# Patient Record
Sex: MALE | Race: BLACK OR AFRICAN AMERICAN | NOT HISPANIC OR LATINO | Employment: OTHER | URBAN - METROPOLITAN AREA
[De-identification: names, ages, dates, MRNs, and addresses within clinical notes are randomized per-mention and may not be internally consistent; named-entity substitution may affect disease eponyms.]

---

## 2017-10-27 ENCOUNTER — HOSPITAL ENCOUNTER (EMERGENCY)
Facility: MEDICAL CENTER | Age: 57
End: 2017-10-27
Attending: EMERGENCY MEDICINE
Payer: COMMERCIAL

## 2017-10-27 ENCOUNTER — APPOINTMENT (OUTPATIENT)
Dept: RADIOLOGY | Facility: MEDICAL CENTER | Age: 57
End: 2017-10-27
Attending: EMERGENCY MEDICINE
Payer: COMMERCIAL

## 2017-10-27 VITALS
DIASTOLIC BLOOD PRESSURE: 88 MMHG | WEIGHT: 41.89 LBS | BODY MASS INDEX: 5.67 KG/M2 | RESPIRATION RATE: 18 BRPM | OXYGEN SATURATION: 98 % | TEMPERATURE: 97.8 F | HEIGHT: 72 IN | SYSTOLIC BLOOD PRESSURE: 132 MMHG | HEART RATE: 62 BPM

## 2017-10-27 DIAGNOSIS — N30.01 ACUTE CYSTITIS WITH HEMATURIA: ICD-10-CM

## 2017-10-27 LAB
ALBUMIN SERPL BCP-MCNC: 3.7 G/DL (ref 3.2–4.9)
ALBUMIN/GLOB SERPL: 1.5 G/DL
ALP SERPL-CCNC: 69 U/L (ref 30–99)
ALT SERPL-CCNC: 23 U/L (ref 2–50)
ANION GAP SERPL CALC-SCNC: 6 MMOL/L (ref 0–11.9)
APPEARANCE UR: ABNORMAL
AST SERPL-CCNC: 30 U/L (ref 12–45)
BACTERIA #/AREA URNS HPF: ABNORMAL /HPF
BASOPHILS # BLD AUTO: 0.5 % (ref 0–1.8)
BASOPHILS # BLD: 0.03 K/UL (ref 0–0.12)
BILIRUB SERPL-MCNC: 1 MG/DL (ref 0.1–1.5)
BILIRUB UR QL STRIP.AUTO: NEGATIVE
BUN SERPL-MCNC: 13 MG/DL (ref 8–22)
CALCIUM SERPL-MCNC: 8.5 MG/DL (ref 8.4–10.2)
CHLORIDE SERPL-SCNC: 107 MMOL/L (ref 96–112)
CO2 SERPL-SCNC: 25 MMOL/L (ref 20–33)
COLOR UR: YELLOW
CREAT SERPL-MCNC: 1.21 MG/DL (ref 0.5–1.4)
EOSINOPHIL # BLD AUTO: 0.06 K/UL (ref 0–0.51)
EOSINOPHIL NFR BLD: 0.9 % (ref 0–6.9)
ERYTHROCYTE [DISTWIDTH] IN BLOOD BY AUTOMATED COUNT: 38.8 FL (ref 35.9–50)
GFR SERPL CREATININE-BSD FRML MDRD: >60 ML/MIN/1.73 M 2
GLOBULIN SER CALC-MCNC: 2.4 G/DL (ref 1.9–3.5)
GLUCOSE SERPL-MCNC: 150 MG/DL (ref 65–99)
GLUCOSE UR STRIP.AUTO-MCNC: NEGATIVE MG/DL
HCT VFR BLD AUTO: 43.7 % (ref 42–52)
HGB BLD-MCNC: 14.1 G/DL (ref 14–18)
IMM GRANULOCYTES # BLD AUTO: 0.01 K/UL (ref 0–0.11)
IMM GRANULOCYTES NFR BLD AUTO: 0.2 % (ref 0–0.9)
KETONES UR STRIP.AUTO-MCNC: NEGATIVE MG/DL
LEUKOCYTE ESTERASE UR QL STRIP.AUTO: ABNORMAL
LIPASE SERPL-CCNC: 91 U/L (ref 7–58)
LYMPHOCYTES # BLD AUTO: 2.41 K/UL (ref 1–4.8)
LYMPHOCYTES NFR BLD: 37.1 % (ref 22–41)
MCH RBC QN AUTO: 27.9 PG (ref 27–33)
MCHC RBC AUTO-ENTMCNC: 32.3 G/DL (ref 33.7–35.3)
MCV RBC AUTO: 86.5 FL (ref 81.4–97.8)
MICRO URNS: ABNORMAL
MONOCYTES # BLD AUTO: 0.56 K/UL (ref 0–0.85)
MONOCYTES NFR BLD AUTO: 8.6 % (ref 0–13.4)
MUCOUS THREADS #/AREA URNS HPF: ABNORMAL /HPF
NEUTROPHILS # BLD AUTO: 3.42 K/UL (ref 1.82–7.42)
NEUTROPHILS NFR BLD: 52.7 % (ref 44–72)
NITRITE UR QL STRIP.AUTO: POSITIVE
NRBC # BLD AUTO: 0 K/UL
NRBC BLD AUTO-RTO: 0 /100 WBC
PH UR STRIP.AUTO: 6 [PH]
PLATELET # BLD AUTO: 268 K/UL (ref 164–446)
PMV BLD AUTO: 9.6 FL (ref 9–12.9)
POTASSIUM SERPL-SCNC: 3.7 MMOL/L (ref 3.6–5.5)
PROT SERPL-MCNC: 6.1 G/DL (ref 6–8.2)
PROT UR QL STRIP: NEGATIVE MG/DL
RBC # BLD AUTO: 5.05 M/UL (ref 4.7–6.1)
RBC # URNS HPF: ABNORMAL /HPF
RBC UR QL AUTO: ABNORMAL
SODIUM SERPL-SCNC: 138 MMOL/L (ref 135–145)
SP GR UR STRIP.AUTO: 1.02
WBC # BLD AUTO: 6.5 K/UL (ref 4.8–10.8)
WBC #/AREA URNS HPF: ABNORMAL /HPF

## 2017-10-27 PROCEDURE — 36415 COLL VENOUS BLD VENIPUNCTURE: CPT

## 2017-10-27 PROCEDURE — 80053 COMPREHEN METABOLIC PANEL: CPT

## 2017-10-27 PROCEDURE — 83690 ASSAY OF LIPASE: CPT

## 2017-10-27 PROCEDURE — 99284 EMERGENCY DEPT VISIT MOD MDM: CPT

## 2017-10-27 PROCEDURE — 81001 URINALYSIS AUTO W/SCOPE: CPT

## 2017-10-27 PROCEDURE — 74177 CT ABD & PELVIS W/CONTRAST: CPT

## 2017-10-27 PROCEDURE — 700105 HCHG RX REV CODE 258: Performed by: EMERGENCY MEDICINE

## 2017-10-27 PROCEDURE — 85025 COMPLETE CBC W/AUTO DIFF WBC: CPT

## 2017-10-27 PROCEDURE — 700117 HCHG RX CONTRAST REV CODE 255: Performed by: EMERGENCY MEDICINE

## 2017-10-27 RX ORDER — OXYCODONE AND ACETAMINOPHEN 10; 325 MG/1; MG/1
1 TABLET ORAL EVERY 8 HOURS PRN
Status: SHIPPED | COMMUNITY
End: 2018-10-05

## 2017-10-27 RX ORDER — LEVOFLOXACIN 750 MG/1
750 TABLET, FILM COATED ORAL DAILY
Qty: 14 TAB | Refills: 0 | Status: SHIPPED | OUTPATIENT
Start: 2017-10-27 | End: 2018-02-19

## 2017-10-27 RX ORDER — COLCHICINE 0.6 MG/1
0.6 TABLET ORAL
Status: SHIPPED | COMMUNITY
End: 2020-06-02

## 2017-10-27 RX ORDER — TAMSULOSIN HYDROCHLORIDE 0.4 MG/1
0.4 CAPSULE ORAL
Status: SHIPPED | COMMUNITY
End: 2018-10-05

## 2017-10-27 RX ORDER — SODIUM CHLORIDE 9 MG/ML
1000 INJECTION, SOLUTION INTRAVENOUS ONCE
Status: COMPLETED | OUTPATIENT
Start: 2017-10-27 | End: 2017-10-27

## 2017-10-27 RX ADMIN — SODIUM CHLORIDE 1000 ML: 9 INJECTION, SOLUTION INTRAVENOUS at 10:53

## 2017-10-27 RX ADMIN — IOHEXOL 100 ML: 350 INJECTION, SOLUTION INTRAVENOUS at 11:17

## 2017-10-27 ASSESSMENT — PAIN SCALES - GENERAL
PAINLEVEL_OUTOF10: 3
PAINLEVEL_OUTOF10: 3

## 2017-10-27 NOTE — ED NOTES
Patient was on antibiotics in early sep. First picked up Ciproo on Sept 5, but identified a contradiction with other meds, so started taking Bactrim for 10 days starting Sep 6th.

## 2017-10-27 NOTE — ED NOTES
Patient in bed with family at bedside. Pt complains on lower abdominal pain intermittently since Aug Pt states he has been treated with Flowmax for kidney stones which resolves the pain, then when medication is finished pain and dysuria comes back. Urine sample taken to lab. Iv started, and blood samples taken to lab. Patient provided fluids, and taken to CT.

## 2017-10-27 NOTE — ED PROVIDER NOTES
ED Provider Note    CHIEF COMPLAINT  Abdominal pain    HPI  Ra Smith is a 57 y.o. male who presents for evaluation of abdominal pain.  Patient states he having pain since early September.  The patient was seen at a hospital in [place], [place] where a CT scan which was equivocal for a kidney stone.  The patient states he was put on antibiotics and Flomax for urinary tract infection by his primary care physician.  Patient presents complaining persistent pain in the left lower quadrant area radiating toward his left groin.  The patient denies: Fever, chills, URI symptoms, cardiorespiratory symptoms, nausea, vomiting, hematemesis, melena, hematochezia, hematuria, dysuria.  No other acute symptomatology or complaints.    REVIEW OF SYSTEMS  See HPI for further details.  No history of: Hypertension, diabetes, thyroid dysfunction, seizures, heart disease, cancer, stroke, renal disorders, liver disorders.  All other systems negative.    PAST MEDICAL HISTORY  Past Medical History:   Diagnosis Date   • Arthritis     knees- OA generalized   • Gout    • Hard of hearing    • Pain     back,legs,knees,wrist,ankle,pain scale 6   • Sleep apnea     does not use CPAP   • Snoring        FAMILY HISTORY  Family History   Problem Relation Age of Onset   • Heart Disease Mother    • Diabetes Mother    • Hypertension Mother        SOCIAL HISTORY  Past history tobacco use; positive alcohol use; positive marijuana use;    SURGICAL HISTORY  Past Surgical History:   Procedure Laterality Date   • CARPAL TUNNEL ENDOSCOPIC Right 12/30/2016    Procedure: CARPAL TUNNEL ENDOSCOPIC;  Surgeon: Dragan Cohen M.D.;  Location: [facility];  Service:    • LUMBAR DECOMPRESSION  11/16/2009    Performed by GARY PAEZ at [facility]   • KNEE ARTHROSCOPY  3/19/2009    Performed by TONYA EWING at [facility]   • MEDIAL MENISCECTOMY  3/19/2009    Performed by TONYA EWING at [facility]    • CARPAL TUNNEL ENDOSCOPIC Left    • KNEE ARTHROSCOPY Left     x2   • KNEE ARTHROSCOPY Right     x 3       CURRENT MEDICATIONS  See nurses notes    ALLERGIES  Allergies   Allergen Reactions   • Aspirin      Patient has a GY6PD deficiency - can't have aspirin       PHYSICAL EXAM  VITAL SIGNS: /88   Pulse 73   Temp 36.6 °C (97.8 °F)   Resp 18   Ht 1.829 m (6')   Wt (!) 19 kg (41 lb 14.2 oz)   SpO2 96%   BMI 5.68 kg/m²    Constitutional: 57-year-old black male, Well developed, Well nourished, No acute distress, Non-toxic appearance.   HENT: ,Atraumatic, Bilateral external ears normal, tympanic membranes clear, Oropharynx moist, No oral exudates, Nose normal.   Eyes: PERRL, EOMI, Conjunctiva normal, No discharge.   Neck: Normal range of motion, No tenderness, Supple, No stridor.   Lymphatic: No lymphadenopathy noted.   Cardiovascular: Normal heart rate, Normal rhythm, No murmurs, No rubs, No gallops.    Thorax & Lungs: Normal Equal breath sounds, No respiratory distress, No wheezing, no stridor, no rales. No chest tenderness.   Abdomen: Tenderness in left lower quadrant area, nondistended, no organomegaly, positive bowel sounds normal in quality. No guarding or rebound.  Skin: Good skin turgor, pink, warm, dry. No rashes, petechiae, purpura. Normal capillary refill.   Back: No tenderness, No CVA tenderness.   Genitalia: External genitalia appear normal, No masses or lesions. No discharge. Nontender  Extremities: Intact distal pulses, No edema, No tenderness, No cyanosis, No clubbing. Vascular: Pulses are 2+, symmetric in the upper and lower extremities.  Musculoskeletal: Mild diffuse arthritic changes. No tenderness to palpation or major deformities noted.   Neurologic: Alert & oriented x 3, Normal motor function, Normal sensory function, No gross focal deficits noted.   Psychiatric: Affect normal, Judgment normal, Mood normal.     RADIOLOGY/PROCEDURES  CT-ABDOMEN-PELVIS WITH   Final Result      1.  No  acute abnormality within the abdomen or pelvis      2.  Thickened bladder wall suggesting chronic cystitis or sequela of bladder outlet obstruction      3.  Facet arthropathy of the lumbar spine      4.  Degenerative subluxation of L5-S1               COURSE & MEDICAL DECISION MAKING  Pertinent Labs & Imaging studies reviewed. (See chart for details)  1.  IV normal saline    Laboratory studies: CBC and differential within normal limits; CMP shows a random blood sugar 150 otherwise within normal; lipase mildly elevated at 91; Urinalysis positive for nitrite and leukocyte esterase, wbc's 10-20, rbc's 2-5, bacteria many;    Discussion: At this time, the patient has findings consistent with cystitis.  The patient has no evidence of toxicity or pyelonephritis or associated kidney stones.  No evidence of any gastrointestinal abnormalities.  The patient looks well clinically.  Based on these findings, I going To trial of outpatient therapy.  The patient was previously on Bactrim.  I will prescribe Levaquin.  Urine culture will be obtained.  I discussed the findings and treatment plan with the patient and his wife.  He indicates they're comfortable with this explanation and disposition.    FINAL IMPRESSION  1. Acute cystitis with hematuria        PLAN  1.  Appropriate discharge instructions give  2.  Levaquin 70 mg daily #14  3.  Follow-up with urology  4.  Recheck if any fever change or worsening symptoms as discussed    Electronically signed by: Guy G Gansert, 10/27/2017 10:18 AM

## 2017-10-27 NOTE — ED NOTES
Med rec complete per patient and [facility] [phone]  Allergies reviewed    patient has been on 3 courses of Flomax and once he's off the pain comes back

## 2017-10-27 NOTE — ED NOTES
Pt amb to triage c/o pain to lower abd and  L flank with dysuria. Hx of kidney stones. Denies n/v.     /88   Pulse 73   Temp 36.6 °C (97.8 °F)   Resp 18   Ht 1.829 m (6')   Wt (!) 19 kg (41 lb 14.2 oz)   SpO2 96%   BMI 5.68 kg/m²

## 2018-02-19 ENCOUNTER — HOSPITAL ENCOUNTER (EMERGENCY)
Facility: MEDICAL CENTER | Age: 58
End: 2018-02-19
Attending: EMERGENCY MEDICINE
Payer: COMMERCIAL

## 2018-02-19 ENCOUNTER — APPOINTMENT (OUTPATIENT)
Dept: RADIOLOGY | Facility: MEDICAL CENTER | Age: 58
End: 2018-02-19
Attending: EMERGENCY MEDICINE
Payer: COMMERCIAL

## 2018-02-19 VITALS
HEART RATE: 80 BPM | HEIGHT: 73 IN | BODY MASS INDEX: 35.21 KG/M2 | TEMPERATURE: 99.1 F | DIASTOLIC BLOOD PRESSURE: 100 MMHG | OXYGEN SATURATION: 96 % | RESPIRATION RATE: 16 BRPM | SYSTOLIC BLOOD PRESSURE: 135 MMHG | WEIGHT: 265.65 LBS

## 2018-02-19 DIAGNOSIS — R10.9 FLANK PAIN: ICD-10-CM

## 2018-02-19 LAB
ALBUMIN SERPL BCP-MCNC: 3.9 G/DL (ref 3.2–4.9)
ALBUMIN/GLOB SERPL: 1.6 G/DL
ALP SERPL-CCNC: 60 U/L (ref 30–99)
ALT SERPL-CCNC: 21 U/L (ref 2–50)
ANION GAP SERPL CALC-SCNC: 3 MMOL/L (ref 0–11.9)
APPEARANCE UR: CLEAR
AST SERPL-CCNC: 28 U/L (ref 12–45)
BASOPHILS # BLD AUTO: 0.4 % (ref 0–1.8)
BASOPHILS # BLD: 0.02 K/UL (ref 0–0.12)
BILIRUB SERPL-MCNC: 1.1 MG/DL (ref 0.1–1.5)
BILIRUB UR QL STRIP.AUTO: NEGATIVE
BUN SERPL-MCNC: 10 MG/DL (ref 8–22)
CALCIUM SERPL-MCNC: 8.8 MG/DL (ref 8.4–10.2)
CHLORIDE SERPL-SCNC: 105 MMOL/L (ref 96–112)
CO2 SERPL-SCNC: 27 MMOL/L (ref 20–33)
COLOR UR: YELLOW
CREAT SERPL-MCNC: 1.11 MG/DL (ref 0.5–1.4)
CULTURE IF INDICATED INDCX: YES UA CULTURE
EKG IMPRESSION: NORMAL
EOSINOPHIL # BLD AUTO: 0.08 K/UL (ref 0–0.51)
EOSINOPHIL NFR BLD: 1.8 % (ref 0–6.9)
EPI CELLS #/AREA URNS HPF: ABNORMAL /HPF
ERYTHROCYTE [DISTWIDTH] IN BLOOD BY AUTOMATED COUNT: 38.5 FL (ref 35.9–50)
GLOBULIN SER CALC-MCNC: 2.5 G/DL (ref 1.9–3.5)
GLUCOSE SERPL-MCNC: 110 MG/DL (ref 65–99)
GLUCOSE UR STRIP.AUTO-MCNC: NEGATIVE MG/DL
HCT VFR BLD AUTO: 44.8 % (ref 42–52)
HGB BLD-MCNC: 14.3 G/DL (ref 14–18)
IMM GRANULOCYTES # BLD AUTO: 0.01 K/UL (ref 0–0.11)
IMM GRANULOCYTES NFR BLD AUTO: 0.2 % (ref 0–0.9)
KETONES UR STRIP.AUTO-MCNC: NEGATIVE MG/DL
LEUKOCYTE ESTERASE UR QL STRIP.AUTO: ABNORMAL
LYMPHOCYTES # BLD AUTO: 2 K/UL (ref 1–4.8)
LYMPHOCYTES NFR BLD: 44.2 % (ref 22–41)
MCH RBC QN AUTO: 27.2 PG (ref 27–33)
MCHC RBC AUTO-ENTMCNC: 31.9 G/DL (ref 33.7–35.3)
MCV RBC AUTO: 85.2 FL (ref 81.4–97.8)
MICRO URNS: ABNORMAL
MONOCYTES # BLD AUTO: 0.47 K/UL (ref 0–0.85)
MONOCYTES NFR BLD AUTO: 10.4 % (ref 0–13.4)
MUCOUS THREADS #/AREA URNS HPF: ABNORMAL /HPF
NEUTROPHILS # BLD AUTO: 1.94 K/UL (ref 1.82–7.42)
NEUTROPHILS NFR BLD: 43 % (ref 44–72)
NITRITE UR QL STRIP.AUTO: NEGATIVE
NRBC # BLD AUTO: 0 K/UL
NRBC BLD-RTO: 0 /100 WBC
PH UR STRIP.AUTO: 6 [PH]
PLATELET # BLD AUTO: 252 K/UL (ref 164–446)
PMV BLD AUTO: 9.5 FL (ref 9–12.9)
POTASSIUM SERPL-SCNC: 3.9 MMOL/L (ref 3.6–5.5)
PROT SERPL-MCNC: 6.4 G/DL (ref 6–8.2)
PROT UR QL STRIP: NEGATIVE MG/DL
RBC # BLD AUTO: 5.26 M/UL (ref 4.7–6.1)
RBC # URNS HPF: ABNORMAL /HPF
RBC UR QL AUTO: ABNORMAL
SODIUM SERPL-SCNC: 135 MMOL/L (ref 135–145)
SP GR UR STRIP.AUTO: 1.01
WBC # BLD AUTO: 4.5 K/UL (ref 4.8–10.8)
WBC #/AREA URNS HPF: ABNORMAL /HPF

## 2018-02-19 PROCEDURE — 36415 COLL VENOUS BLD VENIPUNCTURE: CPT

## 2018-02-19 PROCEDURE — 93005 ELECTROCARDIOGRAM TRACING: CPT | Performed by: EMERGENCY MEDICINE

## 2018-02-19 PROCEDURE — 99285 EMERGENCY DEPT VISIT HI MDM: CPT

## 2018-02-19 PROCEDURE — 96374 THER/PROPH/DIAG INJ IV PUSH: CPT

## 2018-02-19 PROCEDURE — 700111 HCHG RX REV CODE 636 W/ 250 OVERRIDE (IP): Performed by: EMERGENCY MEDICINE

## 2018-02-19 PROCEDURE — 96375 TX/PRO/DX INJ NEW DRUG ADDON: CPT

## 2018-02-19 PROCEDURE — 85025 COMPLETE CBC W/AUTO DIFF WBC: CPT

## 2018-02-19 PROCEDURE — 87086 URINE CULTURE/COLONY COUNT: CPT

## 2018-02-19 PROCEDURE — 81001 URINALYSIS AUTO W/SCOPE: CPT

## 2018-02-19 PROCEDURE — 700105 HCHG RX REV CODE 258: Performed by: EMERGENCY MEDICINE

## 2018-02-19 PROCEDURE — 74176 CT ABD & PELVIS W/O CONTRAST: CPT

## 2018-02-19 PROCEDURE — 80053 COMPREHEN METABOLIC PANEL: CPT

## 2018-02-19 RX ORDER — PREDNISONE 20 MG/1
40 TABLET ORAL DAILY
Qty: 10 TAB | Refills: 0 | Status: SHIPPED | OUTPATIENT
Start: 2018-02-19 | End: 2018-02-19

## 2018-02-19 RX ORDER — PREDNISONE 20 MG/1
40 TABLET ORAL DAILY
Qty: 10 TAB | Refills: 0 | Status: SHIPPED | OUTPATIENT
Start: 2018-02-19 | End: 2018-02-24

## 2018-02-19 RX ORDER — KETOROLAC TROMETHAMINE 30 MG/ML
30 INJECTION, SOLUTION INTRAMUSCULAR; INTRAVENOUS ONCE
Status: DISCONTINUED | OUTPATIENT
Start: 2018-02-19 | End: 2018-02-19 | Stop reason: HOSPADM

## 2018-02-19 RX ORDER — MORPHINE SULFATE 4 MG/ML
4 INJECTION, SOLUTION INTRAMUSCULAR; INTRAVENOUS ONCE
Status: COMPLETED | OUTPATIENT
Start: 2018-02-19 | End: 2018-02-19

## 2018-02-19 RX ORDER — OXYBUTYNIN CHLORIDE 10 MG/1
10 TABLET, EXTENDED RELEASE ORAL DAILY
Status: SHIPPED | COMMUNITY
End: 2018-10-05

## 2018-02-19 RX ORDER — ONDANSETRON 2 MG/ML
4 INJECTION INTRAMUSCULAR; INTRAVENOUS ONCE
Status: COMPLETED | OUTPATIENT
Start: 2018-02-19 | End: 2018-02-19

## 2018-02-19 RX ORDER — SODIUM CHLORIDE 9 MG/ML
1000 INJECTION, SOLUTION INTRAVENOUS ONCE
Status: COMPLETED | OUTPATIENT
Start: 2018-02-19 | End: 2018-02-19

## 2018-02-19 RX ADMIN — ONDANSETRON 4 MG: 2 INJECTION INTRAMUSCULAR; INTRAVENOUS at 08:23

## 2018-02-19 RX ADMIN — MORPHINE SULFATE 4 MG: 4 INJECTION INTRAVENOUS at 08:23

## 2018-02-19 RX ADMIN — SODIUM CHLORIDE 1000 ML: 9 INJECTION, SOLUTION INTRAVENOUS at 08:23

## 2018-02-19 NOTE — ED NOTES
"Chief Complaint   Patient presents with   • Flank Pain     Right, x 3 weeks, appointment w/ Urology next Monday     /100   Pulse 84   Temp 37.3 °C (99.1 °F)   Resp 16   Ht 1.854 m (6' 1\")   Wt 120.5 kg (265 lb 10.5 oz)   SpO2 90%   BMI 35.05 kg/m²     Pt states pain has been getting progressively worse.   Pt took Oxycodone around 0430 this morning.  "

## 2018-02-19 NOTE — DISCHARGE INSTRUCTIONS
Lumbosacral Radiculopathy  Lumbosacral radiculopathy is a condition that involves the spinal nerves and nerve roots in the low back and bottom of the spine. The condition develops when these nerves and nerve roots move out of place or become inflamed and cause symptoms.  CAUSES  This condition may be caused by:  · Pressure from a disk that bulges out of place (herniated disk). A disk is a plate of cartilage that separates bones in the spine.  · Disk degeneration.  · A narrowing of the bones of the lower back (spinal stenosis).  · A tumor.  · An infection.  · An injury that places sudden pressure on the disks that cushion the bones of your lower spine.  RISK FACTORS  This condition is more likely to develop in:  · Males aged 30-50 years.  · Females aged 50-60 years.  · People who lift improperly.  · People who are overweight or live a sedentary lifestyle.  · People who smoke.  · People who perform repetitive activities that strain the spine.  SYMPTOMS  Symptoms of this condition include:  · Pain that goes down from the back into the legs (sciatica). This is the most common symptom. The pain may be worse with sitting, coughing, or sneezing.  · Pain and numbness in the arms and legs.  · Muscle weakness.  · Tingling.  · Loss of bladder control or bowel control.  DIAGNOSIS  This condition is diagnosed with a physical exam and medical history. If the pain is lasting, you may have tests, such as:  · MRI scan.  · X-ray.  · CT scan.  · Myelogram.  · Nerve conduction study.  TREATMENT  This condition is often treated with:  · Hot packs and ice applied to affected areas.  · Stretches to improve flexibility.  · Exercises to strengthen back muscles.  · Physical therapy.  · Pain medicine.  · A steroid injection in the spine.  In some cases, no treatment is needed. If the condition is long-lasting (chronic), or if symptoms are severe, treatment may involve surgery or lifestyle changes, such as following a weight loss plan.  HOME  CARE INSTRUCTIONS  Medicines  · Take medicines only as directed by your health care provider.  · Do not drive or operate heavy machinery while taking pain medicine.  Injury Care  · Apply a heat pack to the injured area as directed by your health care provider.  · Apply ice to the affected area:  ¨ Put ice in a plastic bag.  ¨ Place a towel between your skin and the bag.  ¨ Leave the ice on for 20-30 minutes, every 2 hours while you are awake or as needed. Or, leave the ice on for as long as directed by your health care provider.  Other Instructions  · If you were shown how to do any exercises or stretches, do them as directed by your health care provider.  · If your health care provider prescribed a diet or exercise program, follow it as directed.  · Keep all follow-up visits as directed by your health care provider. This is important.  SEEK MEDICAL CARE IF:  · Your pain does not improve over time even when taking pain medicines.  SEEK IMMEDIATE MEDICAL CARE IF:  · Your develop severe pain.  · Your pain suddenly gets worse.  · You develop increasing weakness in your legs.  · You lose the ability to control your bladder or bowel.  · You have difficulty walking or balancing.  · You have a fever.     This information is not intended to replace advice given to you by your health care provider. Make sure you discuss any questions you have with your health care provider.     Document Released: 12/18/2006 Document Revised: 05/03/2016 Document Reviewed: 12/14/2015  [employer] Patient Education ©2016 [employer]  Flank Pain  Flank pain refers to pain that is located on the side of the body between the upper abdomen and the back. The pain may occur over a short period of time (acute) or may be long-term or reoccurring (chronic). It may be mild or severe. Flank pain can be caused by many things.  CAUSES   Some of the more common causes of flank pain include:  · Muscle strains.    · Muscle spasms.    · A disease of  your spine (vertebral disk disease).    · A lung infection (pneumonia).    · Fluid around your lungs (pulmonary edema).    · A kidney infection.    · Kidney stones.    · A very painful skin rash caused by the chickenpox virus (shingles).    · Gallbladder disease.    HOME CARE INSTRUCTIONS   Home care will depend on the cause of your pain. In general,  · Rest as directed by your caregiver.  · Drink enough fluids to keep your urine clear or pale yellow.  · Only take over-the-counter or prescription medicines as directed by your caregiver. Some medicines may help relieve the pain.  · Tell your caregiver about any changes in your pain.  · Follow up with your caregiver as directed.  SEEK IMMEDIATE MEDICAL CARE IF:   · Your pain is not controlled with medicine.    · You have new or worsening symptoms.  · Your pain increases.    · You have abdominal pain.    · You have shortness of breath.    · You have persistent nausea or vomiting.    · You have swelling in your abdomen.    · You feel faint or pass out.    · You have blood in your urine.  · You have a fever or persistent symptoms for more than 2-3 days.  · You have a fever and your symptoms suddenly get worse.  MAKE SURE YOU:   · Understand these instructions.  · Will watch your condition.  · Will get help right away if you are not doing well or get worse.     This information is not intended to replace advice given to you by your health care provider. Make sure you discuss any questions you have with your health care provider.     Document Released: 02/08/2007 Document Revised: 09/11/2013 Document Reviewed: 08/01/2013  [employer] Interactive Patient Education ©2016 [employer]

## 2018-02-19 NOTE — ED PROVIDER NOTES
ED Provider Note    CHIEF COMPLAINT  Chief Complaint   Patient presents with   • Flank Pain     Right, x 3 weeks, appointment w/ Urology next Monday        HPI  Ra Smith is a 57 y.o. male who presents to the ED with complaints of right-sided flank pain. The patient apparently has a history of kidney stones in the past, has an appointment with urology on Monday. Patient states however, that for the past 3 weeks, been having continuous pain. Premature the right flank with radiation to the right abdominal area. That's been gradually worsening. Patient actually started getting worse over the last 2 days, but decided to present to the ED for evaluation. Patient comes a pain now as 8/10-type pain, worse with movement on the right flank region with radiation to the right mid abdominal area. Denies any fevers, chills, nausea, vomiting, diarrhea, or any other symptoms.    REVIEW OF SYSTEMS  See HPI for further details. All other systems are negative.     PAST MEDICAL HISTORY  Past Medical History:   Diagnosis Date   • Arthritis     knees- OA generalized   • Gout    • Hard of hearing    • Kidney stones    • Pain     back,legs,knees,wrist,ankle,pain scale 6   • Sleep apnea     does not use CPAP   • Snoring        FAMILY HISTORY  Family History   Problem Relation Age of Onset   • Heart Disease Mother    • Diabetes Mother    • Hypertension Mother      Patient's family history has been discussed and is been found to be noncontributory to his present illness  SOCIAL HISTORY  Social History     Social History   • Marital status:      Spouse name: N/A   • Number of children: N/A   • Years of education: N/A     Social History Main Topics   • Smoking status: Current Every Day Smoker     Types: Cigars     Last attempt to quit: 11/1/2016   • Smokeless tobacco: Never Used      Comment: .5 ppd 15 yrs,quit 10/15/09   • Alcohol use Yes      Comment: 3 per week   • Drug use: Yes      Comment: marijuana   • Sexual activity:  Not on file     Other Topics Concern   • Not on file     Social History Narrative   • No narrative on file      Nicanor Mora M.D.  The patient denies any significant tobacco, alcohol or drug use      SURGICAL HISTORY  Past Surgical History:   Procedure Laterality Date   • CARPAL TUNNEL ENDOSCOPIC Right 12/30/2016    Procedure: CARPAL TUNNEL ENDOSCOPIC;  Surgeon: Dragan Cohen M.D.;  Location: SURGERY [facility];  Service:    • LUMBAR DECOMPRESSION  11/16/2009    Performed by GARY PAEZ at SURGERY [facility]   • KNEE ARTHROSCOPY  3/19/2009    Performed by TONYA EWING at SURGERY [facility]   • MEDIAL MENISCECTOMY  3/19/2009    Performed by TONYA EWING at SURGERY [facility]   • CARPAL TUNNEL ENDOSCOPIC Left    • KNEE ARTHROSCOPY Left     x2   • KNEE ARTHROSCOPY Right     x 3       CURRENT MEDICATIONS  Home Medications     Reviewed by Robert Horta (Pharmacy Tech) on 02/19/18 at 0911  Med List Status: Complete   Medication Last Dose Status   colchicine (COLCRYS) 0.6 MG Tab > 3 days Active   Glucos-Chondroit-Hyaluron-MSM (GLUCOSAMINE CHONDROITIN JOINT PO) 2/19/2018 Active   oxybutynin SR (DITROPAN-XL) 10 MG CR tablet 2/19/2018 Active   oxycodone-acetaminophen (PERCOCET-10)  MG Tab 2/19/2018 Active   tamsulosin (FLOMAX) 0.4 MG capsule 2/19/2018 Active              No current facility-administered medications on file prior to encounter.      Current Outpatient Prescriptions on File Prior to Encounter   Medication Sig Dispense Refill   • colchicine (COLCRYS) 0.6 MG Tab Take 0.6 mg by mouth as needed.     • oxycodone-acetaminophen (PERCOCET-10)  MG Tab Take 1 Tab by mouth every 8 hours as needed for Severe Pain.     • tamsulosin (FLOMAX) 0.4 MG capsule Take 0.4 mg by mouth ONE-HALF HOUR AFTER BREAKFAST.           ALLERGIES  Allergies   Allergen Reactions   • Aspirin      Patient has a GY6PD deficiency - can't have aspirin       PHYSICAL EXAM  VITAL SIGNS: BP  "135/100   Pulse 69   Temp 37.3 °C (99.1 °F)   Resp 16   Ht 1.854 m (6' 1\")   Wt 120.5 kg (265 lb 10.5 oz)   SpO2 96%   BMI 35.05 kg/m²    Pulse Oximetry was obtained. It showed a reading of Pulse Oximetry: 90 %.  I interpreted this as not hypoxic.     Constitutional: Well developed, Well nourished, No acute distress, Non-toxic appearance.   HENT: Normocephalic, Atraumatic, Bilateral external ears normal, bilateral tympanic membranes normal, Oropharynx dry mucous membranes, No oral exudates, Nose normal.   Eyes: Pupils are equal round and react to light, extraocular motions are intact, conjunctiva is normal, there are no signs of exudate.   Neck: Supple, no cervical lymphadenopathy, no meningeal signs..   Lymphatic: No lymphadenopathy noted.   Cardiovascular: Regular rate and rhythm without murmurs gallops or rubs.   Thorax & Lungs: Lungs are clear to auscultation bilaterally, there are no wheezes no rales. Chest wall is nontender.  Abdomen: Soft, nontender, nondistended. Bowel sounds are present  Skin: Warm, Dry, No erythema,   Back: No tenderness, No CVA tenderness., Mildly tender about the right paraspinous musculature's of the back, but not reproducible to the pain he is having.   Extremities: Intact distal pulses, no clubbing, no cyanosis, no edema, nontender.  Neurologic: Alert & oriented x 3, Normal motor function, Normal sensory function, No focal deficits noted.     12-lead EKG is obtained, interpreted below by myself     RADIOLOGY/PROCEDURES  CT-RENAL COLIC EVALUATION(A/P W/O)   Final Result      No evidence of renal, ureteral or bladder calculi. No hydronephrosis.   Bladder wall thickening. This may represent trabeculation in the setting of a prominent prostate. Correlation with urinalysis is recommended.      Appendix is mildly dilated without secondary signs of acute appendicitis.      Atherosclerotic plaque.      Facet arthropathy in the lumbar spine.          Results for orders placed or " performed during the hospital encounter of 02/19/18   CBC WITH DIFFERENTIAL   Result Value Ref Range    WBC 4.5 (L) 4.8 - 10.8 K/uL    RBC 5.26 4.70 - 6.10 M/uL    Hemoglobin 14.3 14.0 - 18.0 g/dL    Hematocrit 44.8 42.0 - 52.0 %    MCV 85.2 81.4 - 97.8 fL    MCH 27.2 27.0 - 33.0 pg    MCHC 31.9 (L) 33.7 - 35.3 g/dL    RDW 38.5 35.9 - 50.0 fL    Platelet Count 252 164 - 446 K/uL    MPV 9.5 9.0 - 12.9 fL    Neutrophils-Polys 43.00 (L) 44.00 - 72.00 %    Lymphocytes 44.20 (H) 22.00 - 41.00 %    Monocytes 10.40 0.00 - 13.40 %    Eosinophils 1.80 0.00 - 6.90 %    Basophils 0.40 0.00 - 1.80 %    Immature Granulocytes 0.20 0.00 - 0.90 %    Nucleated RBC 0.00 /100 WBC    Neutrophils (Absolute) 1.94 1.82 - 7.42 K/uL    Lymphs (Absolute) 2.00 1.00 - 4.80 K/uL    Monos (Absolute) 0.47 0.00 - 0.85 K/uL    Eos (Absolute) 0.08 0.00 - 0.51 K/uL    Baso (Absolute) 0.02 0.00 - 0.12 K/uL    Immature Granulocytes (abs) 0.01 0.00 - 0.11 K/uL    NRBC (Absolute) 0.00 K/uL   COMP METABOLIC PANEL   Result Value Ref Range    Sodium 135 135 - 145 mmol/L    Potassium 3.9 3.6 - 5.5 mmol/L    Chloride 105 96 - 112 mmol/L    Co2 27 20 - 33 mmol/L    Anion Gap 3.0 0.0 - 11.9    Glucose 110 (H) 65 - 99 mg/dL    Bun 10 8 - 22 mg/dL    Creatinine 1.11 0.50 - 1.40 mg/dL    Calcium 8.8 8.4 - 10.2 mg/dL    AST(SGOT) 28 12 - 45 U/L    ALT(SGPT) 21 2 - 50 U/L    Alkaline Phosphatase 60 30 - 99 U/L    Total Bilirubin 1.1 0.1 - 1.5 mg/dL    Albumin 3.9 3.2 - 4.9 g/dL    Total Protein 6.4 6.0 - 8.2 g/dL    Globulin 2.5 1.9 - 3.5 g/dL    A-G Ratio 1.6 g/dL   URINALYSIS CULTURE, IF INDICATED   Result Value Ref Range    Color Yellow     Character Clear     Specific Gravity 1.015 <1.035    Ph 6.0 5.0 - 8.0    Glucose Negative Negative mg/dL    Ketones Negative Negative mg/dL    Protein Negative Negative mg/dL    Bilirubin Negative Negative    Nitrite Negative Negative    Leukocyte Esterase Trace (A) Negative    Occult Blood Trace (A) Negative    Micro Urine  Req Microscopic     Culture Indicated Yes UA Culture   URINE MICROSCOPIC (W/UA)   Result Value Ref Range    WBC 2-5 (A) /hpf    RBC 0-2 (A) /hpf    Epithelial Cells Few Few /hpf    Mucous Threads Few /hpf   ESTIMATED GFR   Result Value Ref Range    GFR If African American >60 >60 mL/min/1.73 m 2    GFR If Non African American >60 >60 mL/min/1.73 m 2   EKG (NOW)   Result Value Ref Range    Report       [facility] Emergency Dept.    Test Date:  2018  Pt Name:    SAM DEGROOT          Department: [facility]  MRN:        [id number]                      [facility]  Gender:     Male                         Technician: [id number]  :        1960                   Requested By:GUILLERMINA INFANTE  Order #:    [id number]                    Reading MD:    Measurements  Intervals                                Axis  Rate:       72                           P:          38  UT:         193                          QRS:        1  QRSD:       76                           T:          49  QT:         388  QTc:        425    Interpretive Statements  Sinus rhythm  Probable left atrial enlargement  Compared to ECG 2009 10:10:07  T-wave abnormality no longer present           COURSE & MEDICAL DECISION MAKING  Pertinent Labs & Imaging studies reviewed. (See chart for details)  Patient presents for evaluation. Per clinically, this could very well be muscular or could be a kidney stone. Patient's only had one CT scan that was back in October and there is no signs of kidney stones on that CT scan. I will start the patient with an IV fluid bolus of 1 L due to symptoms consistent with dehydration, and due to continued pain. Hydration is helpful. Upon recheck at 1023 a.m. the patient is feeling improved. CT scan shows no signs of kidney stones. No other significant abnormalities. Laboratory studies at this point. During concerning. Based on physical examination a due fields. A structural  component/musculoskeletal cause for pain. He does describe it as a wrapping sensation around his back sits for a possible that it is a radicular-type discomfort. I will start the patient on steroids for this. He does have his chronic pain medications at home. I recommended for follow-up with primary care physician for recheck in one week and possible physical therapy referral. Return as needed. At this point, I do not feel that it is diverticular/infectious nor do I feel that it is urological.    FINAL IMPRESSION  1. Flank pain           The patient will return for new or worsening symptoms and is stable at the time of discharge.    The patient is referred to a primary physician for blood pressure management, diabetic screening, and for all other preventative health concerns.      DISPOSITION:  Patient will be discharged home in stable condition.    FOLLOW UP:  Nicanor Mora M.D.  [address] [place] [id number]  [phone]    Schedule an appointment as soon as possible for a visit  For further evaluation and possible Physical Therapy referral      OUTPATIENT MEDICATIONS:  New Prescriptions    PREDNISONE (DELTASONE) 20 MG TAB    Take 2 Tabs by mouth every day for 5 days.         Electronically signed by: Que Rodriguez, 2/19/2018 8:03 AM

## 2018-02-19 NOTE — ED NOTES
Reviewed discharge instructions and prescription w/ pt and family member, verbalized understanding to information provided including follow up care and return precautions, denied questions/concerns.  Pt denied c/o pain, ambulated from ER w/ family member.

## 2018-02-21 LAB
BACTERIA UR CULT: NORMAL
SIGNIFICANT IND 70042: NORMAL
SITE SITE: NORMAL
SOURCE SOURCE: NORMAL

## 2018-10-05 ENCOUNTER — APPOINTMENT (OUTPATIENT)
Dept: RADIOLOGY | Facility: MEDICAL CENTER | Age: 58
End: 2018-10-05
Attending: EMERGENCY MEDICINE
Payer: COMMERCIAL

## 2018-10-05 ENCOUNTER — HOSPITAL ENCOUNTER (EMERGENCY)
Facility: MEDICAL CENTER | Age: 58
End: 2018-10-05
Attending: EMERGENCY MEDICINE
Payer: COMMERCIAL

## 2018-10-05 VITALS
OXYGEN SATURATION: 98 % | RESPIRATION RATE: 16 BRPM | SYSTOLIC BLOOD PRESSURE: 115 MMHG | HEART RATE: 69 BPM | WEIGHT: 252.21 LBS | DIASTOLIC BLOOD PRESSURE: 82 MMHG | HEIGHT: 73 IN | BODY MASS INDEX: 33.43 KG/M2 | TEMPERATURE: 97.6 F

## 2018-10-05 DIAGNOSIS — M10.9 GOUTY ARTHROPATHY: ICD-10-CM

## 2018-10-05 LAB
ANION GAP SERPL CALC-SCNC: 7 MMOL/L (ref 0–11.9)
BUN SERPL-MCNC: 17 MG/DL (ref 8–22)
CALCIUM SERPL-MCNC: 8.4 MG/DL (ref 8.4–10.2)
CHLORIDE SERPL-SCNC: 108 MMOL/L (ref 96–112)
CO2 SERPL-SCNC: 21 MMOL/L (ref 20–33)
CREAT SERPL-MCNC: 1.06 MG/DL (ref 0.5–1.4)
GLUCOSE SERPL-MCNC: 118 MG/DL (ref 65–99)
POTASSIUM SERPL-SCNC: 4.1 MMOL/L (ref 3.6–5.5)
SODIUM SERPL-SCNC: 136 MMOL/L (ref 135–145)
URATE SERPL-MCNC: 7 MG/DL (ref 2.5–8.3)

## 2018-10-05 PROCEDURE — 73620 X-RAY EXAM OF FOOT: CPT | Mod: RT

## 2018-10-05 PROCEDURE — 99284 EMERGENCY DEPT VISIT MOD MDM: CPT

## 2018-10-05 PROCEDURE — 73610 X-RAY EXAM OF ANKLE: CPT | Mod: RT

## 2018-10-05 PROCEDURE — 80048 BASIC METABOLIC PNL TOTAL CA: CPT

## 2018-10-05 PROCEDURE — 84550 ASSAY OF BLOOD/URIC ACID: CPT

## 2018-10-05 RX ORDER — QUININE SULFATE 324 MG/1
324 CAPSULE ORAL
COMMUNITY

## 2018-10-05 RX ORDER — INDOMETHACIN 50 MG/1
50 CAPSULE ORAL 3 TIMES DAILY
Status: SHIPPED | COMMUNITY
End: 2018-11-11

## 2018-10-05 RX ORDER — PREDNISONE 20 MG/1
40 TABLET ORAL DAILY
Qty: 30 TAB | Refills: 0 | Status: SHIPPED | OUTPATIENT
Start: 2018-10-05 | End: 2018-10-10

## 2018-10-05 ASSESSMENT — PAIN DESCRIPTION - DESCRIPTORS: DESCRIPTORS: ACHING

## 2018-10-05 NOTE — ED NOTES
ERP at bedside. Pt agrees with plan of care discussed by ERP. AIDET acknowledged with patient. Edwige in low position, side rail up for pt safety. Call light within reach. Will continue to monitor.

## 2018-10-05 NOTE — ED PROVIDER NOTES
ED Provider Note    CHIEF COMPLAINT  Chief Complaint   Patient presents with   • Foot Pain   • Foot Swelling     Seen at 9:55 AM    HPI  Ra Smith is a 58 y.o. male who presents with right ankle pain.  He has a history of gout, it affects his ankles, toes and wrists in the past.  He has been fighting with a flareup in his right ankle for the past 4 months.  It flared up from a recent trip back to [place].  He has been taking colchicine intermittently with transient improvement along with indomethacin.  He feels that it must be different than gout as it is not really responsive to the treatment he usually takes.  He has cut out alcohol and is watching his diet carefully, despite this he feels persistent swelling and pain in the right ankle.    The swelling today he feels is better than usual as he took 9 tablets of colchicine over the past few days along with indomethacin.  He denies any fevers, chills, polyarthralgias, leg pain, leg swelling, history of DVT, chest pain or shortness of breath.    REVIEW OF SYSTEMS  See HPI  PAST MEDICAL HISTORY   has a past medical history of Arthritis; Gout; Hard of hearing; Kidney stones; Pain; Sleep apnea; and Snoring.    SOCIAL HISTORY  Social History     Social History Main Topics   • Smoking status: Current Every Day Smoker     Types: Cigars     Last attempt to quit: 11/1/2016   • Smokeless tobacco: Never Used      Comment: .5 ppd 15 yrs,quit 10/15/09   • Alcohol use Yes      Comment: 3 per week   • Drug use: Yes      Comment: marijuana   • Sexual activity: Not on file       SURGICAL HISTORY   has a past surgical history that includes knee arthroscopy (Left); knee arthroscopy (3/19/2009); medial meniscectomy (3/19/2009); lumbar decompression (11/16/2009); carpal tunnel endoscopic (Left); knee arthroscopy (Right); and carpal tunnel endoscopic (Right, 12/30/2016).    CURRENT MEDICATIONS  Reviewed.  See Encounter Summary.     ALLERGIES  Allergies   Allergen Reactions   •  "Aspirin      Patient has a GY6PD deficiency - can't have aspirin       PHYSICAL EXAM  VITAL SIGNS: /82   Pulse 78   Temp 36.4 °C (97.6 °F)   Resp 16   Ht 1.854 m (6' 1\")   Wt 114.4 kg (252 lb 3.3 oz)   BMI 33.27 kg/m²   Constitutional: Awake, alert in no apparent distress.  HENT: Normocephalic, Bilateral external ears normal. Nose normal.   Eyes: Conjunctiva normal, non-icteric, EOMI.    Thorax & Lungs: Easy unlabored respirations  Cardiovascular:    Abdomen:  No distention  Skin: Visualized skin is  Dry, No erythema, No rash.   Extremities: Right ankle: Very mild malleolar swelling.  Full range of motion, no increased warmth.  The foot is normal in appearance.  Dorsalis pedis 2+.  The lower leg is normal, there is no tenderness throughout the lower extremity.    Neurologic: Alert, Grossly non-focal.   Psychiatric: Affect and Mood normal    RADIOLOGY  DX-ANKLE 3+ VIEWS RIGHT   Final Result         1.  Mild arthropathy of the tibiotalar joint.      2.  Probable ankle effusion.      DX-FOOT-2- RIGHT   Final Result      1.  No acute findings.      2.  Osteoarthropathy of the first MTP and interphalangeal joints are consistent with the patient's history of gout.          Nursing notes and vital signs were reviewed. (See chart for details)    1:19 PM-inform patient of the workup and treatment plan.    Decision Making:  This is a 58 y.o. year old male who presents with subacute to chronic right ankle pain.  The patient walks with a notable limp secondary to the pain.  He has been compliant with his diet for gout.  He notes some transient improvement with colchicine and indomethacin though he never has complete resolution of the pain or swelling.  X-rays taken do show some developing osteoarthritis, likely due to secondary gout.  I do not suspect septic arthritis, the pain is subacute to chronic, he is fully weightbearing and he has decent range of motion.  I do not feel that an emergent aspiration is " clinically indicated.  I will place patient on some steroids as this may have some benefit.  I recommend colchicine for flareups and continued anti-inflammatories.  He may have some benefit from seeing the orthopedic clinic in case there is any surgical correction that can alleviate some of his pain due to his osteoarthritis.  Rheumatology was a consideration though we do not have a referral for them and this may be handled through the primary care physician.    DISPOSITION:  Patient will be discharged home in good condition.    Discharge Medications:  New Prescriptions    PREDNISONE (DELTASONE) 20 MG TAB    Take 2 Tabs by mouth every day for 5 days.       The patient's blood pressure is elevated today. >120/80. I have referred them to primary care for follow up.       The patient was discharged home (see d/c instructions) and parent was told to return immediately for any signs or symptoms listed, or any worsening at all.  The patient's parent verbally agreed to the discharge precautions and follow-up plan which is documented in EPIC.    FINAL IMPRESSION  1. Gouty arthropathy

## 2018-10-05 NOTE — ED NOTES
Med rec completed per pt and wife at bedside.   Antibiotics within last 30 days: No  Patient allergies have been reviewed: Yes

## 2018-10-05 NOTE — ED NOTES
ERP at bedside. Discharge instructions provided.  Pt verbalized the understanding of discharge instructions to follow up with JU, PCP and to return to ER if condition worsens.  Pt ambulated out of ER without difficulty.

## 2018-10-24 ENCOUNTER — APPOINTMENT (OUTPATIENT)
Dept: RADIOLOGY | Facility: MEDICAL CENTER | Age: 58
End: 2018-10-24
Attending: ORTHOPAEDIC SURGERY
Payer: COMMERCIAL

## 2018-10-24 DIAGNOSIS — M10.471 OTHER SECONDARY GOUT, RIGHT ANKLE AND FOOT: ICD-10-CM

## 2018-10-24 PROCEDURE — A9585 GADOBUTROL INJECTION: HCPCS | Performed by: ORTHOPAEDIC SURGERY

## 2018-10-24 PROCEDURE — 73723 MRI JOINT LWR EXTR W/O&W/DYE: CPT | Mod: RT

## 2018-10-24 PROCEDURE — 700117 HCHG RX CONTRAST REV CODE 255: Performed by: ORTHOPAEDIC SURGERY

## 2018-10-24 RX ORDER — GADOBUTROL 604.72 MG/ML
10 INJECTION INTRAVENOUS ONCE
Status: COMPLETED | OUTPATIENT
Start: 2018-10-24 | End: 2018-10-24

## 2018-10-24 RX ADMIN — GADOBUTROL 10 ML: 604.72 INJECTION INTRAVENOUS at 12:54

## 2018-11-11 ENCOUNTER — APPOINTMENT (OUTPATIENT)
Dept: RADIOLOGY | Facility: MEDICAL CENTER | Age: 58
DRG: 697 | End: 2018-11-11
Attending: EMERGENCY MEDICINE
Payer: COMMERCIAL

## 2018-11-11 ENCOUNTER — HOSPITAL ENCOUNTER (INPATIENT)
Facility: MEDICAL CENTER | Age: 58
LOS: 4 days | DRG: 697 | End: 2018-11-15
Attending: EMERGENCY MEDICINE | Admitting: HOSPITALIST
Payer: COMMERCIAL

## 2018-11-11 DIAGNOSIS — K85.90 ACUTE PANCREATITIS, UNSPECIFIED COMPLICATION STATUS, UNSPECIFIED PANCREATITIS TYPE: ICD-10-CM

## 2018-11-11 DIAGNOSIS — R10.9 LEFT FLANK PAIN: ICD-10-CM

## 2018-11-11 PROBLEM — N40.0 BPH (BENIGN PROSTATIC HYPERPLASIA): Status: ACTIVE | Noted: 2018-11-11

## 2018-11-11 LAB
ALBUMIN SERPL BCP-MCNC: 4.5 G/DL (ref 3.2–4.9)
ALBUMIN/GLOB SERPL: 1.4 G/DL
ALP SERPL-CCNC: 90 U/L (ref 30–99)
ALT SERPL-CCNC: 33 U/L (ref 2–50)
ANION GAP SERPL CALC-SCNC: 7 MMOL/L (ref 0–11.9)
APPEARANCE UR: CLEAR
AST SERPL-CCNC: 32 U/L (ref 12–45)
BACTERIA #/AREA URNS HPF: ABNORMAL /HPF
BASOPHILS # BLD AUTO: 0.4 % (ref 0–1.8)
BASOPHILS # BLD: 0.03 K/UL (ref 0–0.12)
BILIRUB SERPL-MCNC: 1 MG/DL (ref 0.1–1.5)
BILIRUB UR QL STRIP.AUTO: NEGATIVE
BUN SERPL-MCNC: 15 MG/DL (ref 8–22)
CALCIUM SERPL-MCNC: 8.7 MG/DL (ref 8.4–10.2)
CHLORIDE SERPL-SCNC: 101 MMOL/L (ref 96–112)
CHOLEST SERPL-MCNC: 180 MG/DL (ref 100–199)
CO2 SERPL-SCNC: 28 MMOL/L (ref 20–33)
COLOR UR: YELLOW
CREAT SERPL-MCNC: 1.12 MG/DL (ref 0.5–1.4)
EOSINOPHIL # BLD AUTO: 0.08 K/UL (ref 0–0.51)
EOSINOPHIL NFR BLD: 1 % (ref 0–6.9)
EPI CELLS #/AREA URNS HPF: ABNORMAL /HPF
ERYTHROCYTE [DISTWIDTH] IN BLOOD BY AUTOMATED COUNT: 40.3 FL (ref 35.9–50)
GLOBULIN SER CALC-MCNC: 3.2 G/DL (ref 1.9–3.5)
GLUCOSE SERPL-MCNC: 98 MG/DL (ref 65–99)
GLUCOSE UR STRIP.AUTO-MCNC: NEGATIVE MG/DL
HCT VFR BLD AUTO: 49.5 % (ref 42–52)
HDLC SERPL-MCNC: 66 MG/DL
HGB BLD-MCNC: 15.5 G/DL (ref 14–18)
IMM GRANULOCYTES # BLD AUTO: 0.04 K/UL (ref 0–0.11)
IMM GRANULOCYTES NFR BLD AUTO: 0.5 % (ref 0–0.9)
KETONES UR STRIP.AUTO-MCNC: NEGATIVE MG/DL
LDLC SERPL CALC-MCNC: 95 MG/DL
LEUKOCYTE ESTERASE UR QL STRIP.AUTO: ABNORMAL
LIPASE SERPL-CCNC: 332 U/L (ref 7–58)
LYMPHOCYTES # BLD AUTO: 2.75 K/UL (ref 1–4.8)
LYMPHOCYTES NFR BLD: 34 % (ref 22–41)
MAGNESIUM SERPL-MCNC: 2.2 MG/DL (ref 1.5–2.5)
MCH RBC QN AUTO: 27.4 PG (ref 27–33)
MCHC RBC AUTO-ENTMCNC: 31.3 G/DL (ref 33.7–35.3)
MCV RBC AUTO: 87.5 FL (ref 81.4–97.8)
MICRO URNS: ABNORMAL
MONOCYTES # BLD AUTO: 0.72 K/UL (ref 0–0.85)
MONOCYTES NFR BLD AUTO: 8.9 % (ref 0–13.4)
NEUTROPHILS # BLD AUTO: 4.46 K/UL (ref 1.82–7.42)
NEUTROPHILS NFR BLD: 55.2 % (ref 44–72)
NITRITE UR QL STRIP.AUTO: NEGATIVE
NRBC # BLD AUTO: 0 K/UL
NRBC BLD-RTO: 0 /100 WBC
PH UR STRIP.AUTO: 6.5 [PH]
PLATELET # BLD AUTO: 349 K/UL (ref 164–446)
PMV BLD AUTO: 9 FL (ref 9–12.9)
POTASSIUM SERPL-SCNC: 3.9 MMOL/L (ref 3.6–5.5)
PROT SERPL-MCNC: 7.7 G/DL (ref 6–8.2)
PROT UR QL STRIP: NEGATIVE MG/DL
RBC # BLD AUTO: 5.66 M/UL (ref 4.7–6.1)
RBC # URNS HPF: ABNORMAL /HPF
RBC UR QL AUTO: ABNORMAL
SODIUM SERPL-SCNC: 136 MMOL/L (ref 135–145)
SP GR UR STRIP.AUTO: 1.01
TRIGL SERPL-MCNC: 93 MG/DL (ref 0–149)
TSH SERPL DL<=0.005 MIU/L-ACNC: 1.16 UIU/ML (ref 0.38–5.33)
WBC # BLD AUTO: 8.1 K/UL (ref 4.8–10.8)
WBC #/AREA URNS HPF: ABNORMAL /HPF

## 2018-11-11 PROCEDURE — 99222 1ST HOSP IP/OBS MODERATE 55: CPT | Performed by: HOSPITALIST

## 2018-11-11 PROCEDURE — 74176 CT ABD & PELVIS W/O CONTRAST: CPT

## 2018-11-11 PROCEDURE — 81001 URINALYSIS AUTO W/SCOPE: CPT

## 2018-11-11 PROCEDURE — 99285 EMERGENCY DEPT VISIT HI MDM: CPT

## 2018-11-11 PROCEDURE — 700102 HCHG RX REV CODE 250 W/ 637 OVERRIDE(OP): Performed by: HOSPITALIST

## 2018-11-11 PROCEDURE — 700105 HCHG RX REV CODE 258: Performed by: HOSPITALIST

## 2018-11-11 PROCEDURE — 36415 COLL VENOUS BLD VENIPUNCTURE: CPT

## 2018-11-11 PROCEDURE — 80053 COMPREHEN METABOLIC PANEL: CPT

## 2018-11-11 PROCEDURE — 96375 TX/PRO/DX INJ NEW DRUG ADDON: CPT

## 2018-11-11 PROCEDURE — 770006 HCHG ROOM/CARE - MED/SURG/GYN SEMI*

## 2018-11-11 PROCEDURE — 80061 LIPID PANEL: CPT

## 2018-11-11 PROCEDURE — 83690 ASSAY OF LIPASE: CPT

## 2018-11-11 PROCEDURE — 85025 COMPLETE CBC W/AUTO DIFF WBC: CPT

## 2018-11-11 PROCEDURE — 84443 ASSAY THYROID STIM HORMONE: CPT

## 2018-11-11 PROCEDURE — 96374 THER/PROPH/DIAG INJ IV PUSH: CPT

## 2018-11-11 PROCEDURE — 83735 ASSAY OF MAGNESIUM: CPT

## 2018-11-11 PROCEDURE — 700111 HCHG RX REV CODE 636 W/ 250 OVERRIDE (IP): Performed by: EMERGENCY MEDICINE

## 2018-11-11 PROCEDURE — 700105 HCHG RX REV CODE 258: Performed by: EMERGENCY MEDICINE

## 2018-11-11 PROCEDURE — A9270 NON-COVERED ITEM OR SERVICE: HCPCS | Performed by: HOSPITALIST

## 2018-11-11 RX ORDER — BISACODYL 10 MG
10 SUPPOSITORY, RECTAL RECTAL
Status: DISCONTINUED | OUTPATIENT
Start: 2018-11-11 | End: 2018-11-15 | Stop reason: HOSPADM

## 2018-11-11 RX ORDER — OXYCODONE HYDROCHLORIDE 5 MG/1
5 TABLET ORAL
Status: DISCONTINUED | OUTPATIENT
Start: 2018-11-11 | End: 2018-11-15 | Stop reason: HOSPADM

## 2018-11-11 RX ORDER — OXYCODONE HYDROCHLORIDE 5 MG/1
2.5 TABLET ORAL
Status: DISCONTINUED | OUTPATIENT
Start: 2018-11-11 | End: 2018-11-15 | Stop reason: HOSPADM

## 2018-11-11 RX ORDER — SODIUM CHLORIDE 9 MG/ML
1000 INJECTION, SOLUTION INTRAVENOUS ONCE
Status: COMPLETED | OUTPATIENT
Start: 2018-11-11 | End: 2018-11-11

## 2018-11-11 RX ORDER — SILODOSIN 8 MG/1
8 CAPSULE ORAL
COMMUNITY
End: 2019-03-26

## 2018-11-11 RX ORDER — ONDANSETRON 2 MG/ML
4 INJECTION INTRAMUSCULAR; INTRAVENOUS ONCE
Status: COMPLETED | OUTPATIENT
Start: 2018-11-11 | End: 2018-11-11

## 2018-11-11 RX ORDER — OXYCODONE AND ACETAMINOPHEN 10; 325 MG/1; MG/1
1-2 TABLET ORAL EVERY 8 HOURS PRN
Status: DISCONTINUED | OUTPATIENT
Start: 2018-11-11 | End: 2018-11-13

## 2018-11-11 RX ORDER — MORPHINE SULFATE 4 MG/ML
4 INJECTION, SOLUTION INTRAMUSCULAR; INTRAVENOUS
Status: COMPLETED | OUTPATIENT
Start: 2018-11-11 | End: 2018-11-12

## 2018-11-11 RX ORDER — AMOXICILLIN 250 MG
2 CAPSULE ORAL 2 TIMES DAILY
Status: DISCONTINUED | OUTPATIENT
Start: 2018-11-11 | End: 2018-11-15 | Stop reason: HOSPADM

## 2018-11-11 RX ORDER — TAMSULOSIN HYDROCHLORIDE 0.4 MG/1
0.4 CAPSULE ORAL
Status: DISCONTINUED | OUTPATIENT
Start: 2018-11-11 | End: 2018-11-14

## 2018-11-11 RX ORDER — KETOROLAC TROMETHAMINE 30 MG/ML
15 INJECTION, SOLUTION INTRAMUSCULAR; INTRAVENOUS ONCE
Status: COMPLETED | OUTPATIENT
Start: 2018-11-11 | End: 2018-11-11

## 2018-11-11 RX ORDER — OXYCODONE AND ACETAMINOPHEN 10; 325 MG/1; MG/1
1-2 TABLET ORAL EVERY 8 HOURS PRN
COMMUNITY
End: 2020-06-02

## 2018-11-11 RX ORDER — CYCLOBENZAPRINE HCL 10 MG
10 TABLET ORAL 3 TIMES DAILY PRN
Status: ON HOLD | COMMUNITY
End: 2018-11-15

## 2018-11-11 RX ORDER — HYDROMORPHONE HYDROCHLORIDE 1 MG/ML
0.25 INJECTION, SOLUTION INTRAMUSCULAR; INTRAVENOUS; SUBCUTANEOUS
Status: DISCONTINUED | OUTPATIENT
Start: 2018-11-11 | End: 2018-11-15 | Stop reason: HOSPADM

## 2018-11-11 RX ORDER — INDOMETHACIN 25 MG/1
50 CAPSULE ORAL PRN
COMMUNITY
End: 2019-03-26

## 2018-11-11 RX ORDER — POLYETHYLENE GLYCOL 3350 17 G/17G
1 POWDER, FOR SOLUTION ORAL
Status: DISCONTINUED | OUTPATIENT
Start: 2018-11-11 | End: 2018-11-15 | Stop reason: HOSPADM

## 2018-11-11 RX ORDER — ACETAMINOPHEN 325 MG/1
650 TABLET ORAL EVERY 6 HOURS PRN
Status: DISCONTINUED | OUTPATIENT
Start: 2018-11-11 | End: 2018-11-15 | Stop reason: HOSPADM

## 2018-11-11 RX ORDER — SODIUM CHLORIDE 9 MG/ML
INJECTION, SOLUTION INTRAVENOUS CONTINUOUS
Status: DISCONTINUED | OUTPATIENT
Start: 2018-11-11 | End: 2018-11-15 | Stop reason: HOSPADM

## 2018-11-11 RX ORDER — CYCLOBENZAPRINE HCL 10 MG
10 TABLET ORAL 3 TIMES DAILY PRN
Status: DISCONTINUED | OUTPATIENT
Start: 2018-11-11 | End: 2018-11-15

## 2018-11-11 RX ADMIN — SODIUM CHLORIDE 1000 ML: 9 INJECTION, SOLUTION INTRAVENOUS at 11:53

## 2018-11-11 RX ADMIN — SODIUM CHLORIDE: 9 INJECTION, SOLUTION INTRAVENOUS at 14:25

## 2018-11-11 RX ADMIN — MORPHINE SULFATE 4 MG: 4 INJECTION INTRAVENOUS at 18:01

## 2018-11-11 RX ADMIN — MORPHINE SULFATE 4 MG: 4 INJECTION INTRAVENOUS at 12:50

## 2018-11-11 RX ADMIN — KETOROLAC TROMETHAMINE 15 MG: 30 INJECTION, SOLUTION INTRAMUSCULAR at 11:53

## 2018-11-11 RX ADMIN — OXYCODONE HYDROCHLORIDE AND ACETAMINOPHEN 2 TABLET: 10; 325 TABLET ORAL at 21:16

## 2018-11-11 RX ADMIN — SODIUM CHLORIDE: 9 INJECTION, SOLUTION INTRAVENOUS at 22:32

## 2018-11-11 RX ADMIN — ONDANSETRON 4 MG: 2 INJECTION INTRAMUSCULAR; INTRAVENOUS at 12:50

## 2018-11-11 RX ADMIN — STANDARDIZED SENNA CONCENTRATE AND DOCUSATE SODIUM 2 TABLET: 8.6; 5 TABLET, FILM COATED ORAL at 17:58

## 2018-11-11 ASSESSMENT — COGNITIVE AND FUNCTIONAL STATUS - GENERAL
SUGGESTED CMS G CODE MODIFIER DAILY ACTIVITY: CH
MOBILITY SCORE: 24
SUGGESTED CMS G CODE MODIFIER MOBILITY: CH
DAILY ACTIVITIY SCORE: 24

## 2018-11-11 ASSESSMENT — ENCOUNTER SYMPTOMS
BLURRED VISION: 0
DEPRESSION: 0
HEARTBURN: 0
MEMORY LOSS: 0
NAUSEA: 0
HEADACHES: 0
STRIDOR: 0
TINGLING: 0
EYE PAIN: 0
SORE THROAT: 0
SPUTUM PRODUCTION: 0
CONSTIPATION: 0
WEAKNESS: 0
NERVOUS/ANXIOUS: 0
BLOOD IN STOOL: 0
PND: 0
TREMORS: 0
HEMOPTYSIS: 0
PALPITATIONS: 0
SHORTNESS OF BREATH: 0
DOUBLE VISION: 0
ABDOMINAL PAIN: 1
DIZZINESS: 0
VOMITING: 0
FEVER: 0
CLAUDICATION: 0
CHILLS: 0
BACK PAIN: 0
MYALGIAS: 0
SENSORY CHANGE: 0
SPEECH CHANGE: 0
ORTHOPNEA: 0
PHOTOPHOBIA: 0
NECK PAIN: 0
COUGH: 0

## 2018-11-11 ASSESSMENT — PAIN DESCRIPTION - DESCRIPTORS: DESCRIPTORS: ACHING

## 2018-11-11 ASSESSMENT — PAIN SCALES - GENERAL
PAINLEVEL_OUTOF10: 3
PAINLEVEL_OUTOF10: 5
PAINLEVEL_OUTOF10: 3
PAINLEVEL_OUTOF10: 8
PAINLEVEL_OUTOF10: 3

## 2018-11-11 ASSESSMENT — PATIENT HEALTH QUESTIONNAIRE - PHQ9
2. FEELING DOWN, DEPRESSED, IRRITABLE, OR HOPELESS: NOT AT ALL
1. LITTLE INTEREST OR PLEASURE IN DOING THINGS: NOT AT ALL
2. FEELING DOWN, DEPRESSED, IRRITABLE, OR HOPELESS: NOT AT ALL
SUM OF ALL RESPONSES TO PHQ9 QUESTIONS 1 AND 2: 0
1. LITTLE INTEREST OR PLEASURE IN DOING THINGS: NOT AT ALL
SUM OF ALL RESPONSES TO PHQ9 QUESTIONS 1 AND 2: 0

## 2018-11-11 ASSESSMENT — LIFESTYLE VARIABLES
EVER_SMOKED: YES
ALCOHOL_USE: NO

## 2018-11-11 NOTE — ASSESSMENT & PLAN NOTE
-Patient is on indomethacin and colchicine at home, both are PRN  -Currently with no symptoms of gout therefore no medications currently needed

## 2018-11-11 NOTE — ED NOTES
Med rec updated and complete  Allergies reviewed  Interviewed pt with wife at bedside with permission from pt.  Pts wife had a list of medications, went over list of medications and returned list of medications back to pts wife.  Pt reports no vitamins or OTC's.  Pts wife reports no antibiotics in the last 30 days.

## 2018-11-11 NOTE — ED NOTES
Pt medicated as directed. IV WNL. Side rails up x1 with call light in reach. Family at bedside. Cont pulse ox placed.

## 2018-11-11 NOTE — ED TRIAGE NOTES
"Pt is accompanied by a family member.  He reports a history of \"kidney stones\" this past January. He C/O recurring left flank pain referred to his groin developing for the past 3 days.   "

## 2018-11-11 NOTE — ED NOTES
IV started in RAC with 20g x1 attempt. Labs drawn from start, sent to lab for processing. IV flushed with NS without difficulty. Pt unable to sit back on gurney. Sitting forward on gurney with side rail up x1.

## 2018-11-11 NOTE — ASSESSMENT & PLAN NOTE
-On silodosin at home, now on Flomax  -Continue, states his stream is not quite as strong as normal, increase Flomax to 0.8 mg

## 2018-11-11 NOTE — ED PROVIDER NOTES
ED Provider Note    CHIEF COMPLAINT  Chief Complaint   Patient presents with   • Flank Pain       HPI  Ra Smith is a 58 y.o. male who presents Hepatitis C with abdominal pain.  Symptoms started 5 days ago.  Gradually worsening.  Located mostly over the left back radiates to the left abdomen and left lower quadrant.  Worse with movement.  Severe.  Constant aching.  No nausea vomiting.  Normal bowel movements.  No dysuria hematuria or frequency.  Has a history of kidney stones and this feels similar.  Does not generally drink alcohol, but did have a few drinks over the last 2 nights because of the pain.    REVIEW OF SYSTEMS  As per HPI, otherwise a 10 point review of systems is negative    PAST MEDICAL HISTORY  Past Medical History:   Diagnosis Date   • Arthritis     knees- OA generalized   • Gout    • Hard of hearing    • Kidney stones    • Pain     back,legs,knees,wrist,ankle,pain scale 6   • Sleep apnea     does not use CPAP   • Snoring        SOCIAL HISTORY  Social History   Substance Use Topics   • Smoking status: Current Every Day Smoker     Types: Cigars     Last attempt to quit: 11/1/2016   • Smokeless tobacco: Never Used      Comment: .5 ppd 15 yrs,quit 10/15/09   • Alcohol use Yes      Comment: 3 per week       SURGICAL HISTORY  Past Surgical History:   Procedure Laterality Date   • CARPAL TUNNEL ENDOSCOPIC Right 12/30/2016    Procedure: CARPAL TUNNEL ENDOSCOPIC;  Surgeon: Dragan Cohen M.D.;  Location: SURGERY [facility];  Service:    • LUMBAR DECOMPRESSION  11/16/2009    Performed by GARY PAZE at SURGERY [facility]   • KNEE ARTHROSCOPY  3/19/2009    Performed by TONYA EWING at [facility]   • MEDIAL MENISCECTOMY  3/19/2009    Performed by TONYA EWING at [facility]   • CARPAL TUNNEL ENDOSCOPIC Left    • KNEE ARTHROSCOPY Left     x2   • KNEE ARTHROSCOPY Right     x 3       CURRENT MEDICATIONS  Home Medications     Reviewed by Peace REY  Robert Brewster (Pharmacy Tech) on 11/11/18 at 1233  Med List Status: Complete   Medication Last Dose Status   colchicine (COLCRYS) 0.6 MG Tab > 1 week Active   cyclobenzaprine (FLEXERIL) 10 MG Tab 11/10/2018 Active   indomethacin (INDOCIN) 25 MG Cap > 6 days Active   oxyCODONE-acetaminophen (PERCOCET-10)  MG Tab 11/11/2018 Active   quiNINE 324 MG capsule > 5 days Active   silodosin (RAPAFLO) 8 MG Cap capsule 11/11/2018 Active                ALLERGIES  Allergies   Allergen Reactions   • Aspirin      Patient has a GY6PD deficiency - can't have aspirin       PHYSICAL EXAM  VITAL SIGNS: /108   Pulse 88   Temp 36.6 °C (97.8 °F)   Resp 18   Wt 115 kg (253 lb 8.5 oz)   SpO2 98%   BMI 33.45 kg/m²    Constitutional: Awake and alert.  Appears uncomfortable  HENT:  Atraumatic, Normocephalic.Oropharynx dry mucus membranes, Nose normal inspection.   Eyes: Normal inspection  Neck: Supple  Cardiovascular: Normal heart rate, Normal rhythm.  Symmetric peripheral pulses.   Thorax & Lungs: No respiratory distress, No wheezing, No rales, No rhonchi, No chest tenderness.   Abdomen: Bowel sounds normal, soft, non-distended, tender to palpation left lower abdomen and left groin.  No hernias.  Uncircumcised male.  No testicular swelling or tenderness.  Skin: Warm, Dry, No rash.   Back: No tenderness, No CVA tenderness.   Extremities: No asymmetric swelling  Neurologic: Grossly normal   Psychiatric: Anxious appearing    RADIOLOGY/PROCEDURES  CT-RENAL COLIC EVALUATION(A/P W/O)   Final Result      1.  No evidence of renal or ureteral stone or evidence of hydronephrosis. No evidence of inflammatory change.      2.  Mild diffuse thickening of the wall of the urinary bladder suggesting chronic outlet obstruction.      3.  Enlarged prostate with prostatic calcification.           Imaging is interpreted by radiologist    Labs:  Results for orders placed or performed during the hospital encounter of 11/11/18   CBC WITH DIFFERENTIAL    Result Value Ref Range    WBC 8.1 4.8 - 10.8 K/uL    RBC 5.66 4.70 - 6.10 M/uL    Hemoglobin 15.5 14.0 - 18.0 g/dL    Hematocrit 49.5 42.0 - 52.0 %    MCV 87.5 81.4 - 97.8 fL    MCH 27.4 27.0 - 33.0 pg    MCHC 31.3 (L) 33.7 - 35.3 g/dL    RDW 40.3 35.9 - 50.0 fL    Platelet Count 349 164 - 446 K/uL    MPV 9.0 9.0 - 12.9 fL    Neutrophils-Polys 55.20 44.00 - 72.00 %    Lymphocytes 34.00 22.00 - 41.00 %    Monocytes 8.90 0.00 - 13.40 %    Eosinophils 1.00 0.00 - 6.90 %    Basophils 0.40 0.00 - 1.80 %    Immature Granulocytes 0.50 0.00 - 0.90 %    Nucleated RBC 0.00 /100 WBC    Neutrophils (Absolute) 4.46 1.82 - 7.42 K/uL    Lymphs (Absolute) 2.75 1.00 - 4.80 K/uL    Monos (Absolute) 0.72 0.00 - 0.85 K/uL    Eos (Absolute) 0.08 0.00 - 0.51 K/uL    Baso (Absolute) 0.03 0.00 - 0.12 K/uL    Immature Granulocytes (abs) 0.04 0.00 - 0.11 K/uL    NRBC (Absolute) 0.00 K/uL   COMP METABOLIC PANEL   Result Value Ref Range    Sodium 136 135 - 145 mmol/L    Potassium 3.9 3.6 - 5.5 mmol/L    Chloride 101 96 - 112 mmol/L    Co2 28 20 - 33 mmol/L    Anion Gap 7.0 0.0 - 11.9    Glucose 98 65 - 99 mg/dL    Bun 15 8 - 22 mg/dL    Creatinine 1.12 0.50 - 1.40 mg/dL    Calcium 8.7 8.4 - 10.2 mg/dL    AST(SGOT) 32 12 - 45 U/L    ALT(SGPT) 33 2 - 50 U/L    Alkaline Phosphatase 90 30 - 99 U/L    Total Bilirubin 1.0 0.1 - 1.5 mg/dL    Albumin 4.5 3.2 - 4.9 g/dL    Total Protein 7.7 6.0 - 8.2 g/dL    Globulin 3.2 1.9 - 3.5 g/dL    A-G Ratio 1.4 g/dL   LIPASE   Result Value Ref Range    Lipase 332 (H) 7 - 58 U/L   URINALYSIS,CULTURE IF INDICATED   Result Value Ref Range    Color Yellow     Character Clear     Specific Gravity 1.010 <1.035    Ph 6.5 5.0 - 8.0    Glucose Negative Negative mg/dL    Ketones Negative Negative mg/dL    Protein Negative Negative mg/dL    Bilirubin Negative Negative    Nitrite Negative Negative    Leukocyte Esterase Small (A) Negative    Occult Blood Trace (A) Negative    Micro Urine Req Microscopic    URINE  MICROSCOPIC (W/UA)   Result Value Ref Range    WBC 2-5 (A) /hpf    RBC 2-5 (A) /hpf    Bacteria Rare (A) None /hpf    Epithelial Cells Rare Few /hpf   ESTIMATED GFR   Result Value Ref Range    GFR If African American >60 >60 mL/min/1.73 m 2    GFR If Non African American >60 >60 mL/min/1.73 m 2       Medications   morphine (pf) 4 mg/ml injection 4 mg (not administered)   ondansetron (ZOFRAN) syringe/vial injection 4 mg (not administered)   NS infusion 1,000 mL (1,000 mL Intravenous New Bag 11/11/18 1153)   ketorolac (TORADOL) injection 15 mg (15 mg Intravenous Given 11/11/18 1153)       HYDRATION: Based on the patient's presentation of Dehydration the patient was given IV fluids. IV Hydration was used because oral hydration was not as rapid as required. Upon recheck following hydration, the patient was Stable.    COURSE & MEDICAL DECISION MAKING  Patient presents with flank pain and abdominal pain that he says felt similar to his previous stones; however, he has significant tenderness diffusely over his abdomen with increased tenderness over the left side of the abdomen.  He was given normal saline as mentioned above.  Toradol was initially administered without improvement.  Given morphine and Zofran.  Data was obtained with results as above.  He is noted to have pancreatitis of unclear cause.  I sent a lipid panel.  No obvious biliary abnormality on laboratory data or imaging.  Patient will need to be admitted to the hospital for further workup and treatment.  Hospitalist was paged for admission.      FINAL IMPRESSION  1.  Pancreatitis      This dictation was created using voice recognition software. The accuracy of the dictation is limited to the abilities of the software.  The nursing notes were reviewed and certain aspects of this information were incorporated into this note.      Electronically signed by: Devin Rogers, 11/11/2018 12:42 PM

## 2018-11-11 NOTE — ED NOTES
Attempted to call report. No answer. Pt tranferred to U via gurney with all belongings by Transport Tech.

## 2018-11-11 NOTE — ASSESSMENT & PLAN NOTE
-No epigastric pain  -I do not feel this is pancreatitis at this time, possibly upon arrival but no longer  -Pain is left lower back as well as left groin and left testicle  -Patient now tolerating regular diet but is currently n.p.o. for urologic procedure

## 2018-11-12 PROBLEM — R10.9 LEFT FLANK PAIN: Status: ACTIVE | Noted: 2018-11-12

## 2018-11-12 PROBLEM — R03.0 ELEVATED BLOOD PRESSURE READING: Status: ACTIVE | Noted: 2018-11-12

## 2018-11-12 LAB
ALBUMIN SERPL BCP-MCNC: 3.5 G/DL (ref 3.2–4.9)
ALBUMIN/GLOB SERPL: 1.2 G/DL
ALP SERPL-CCNC: 70 U/L (ref 30–99)
ALT SERPL-CCNC: 29 U/L (ref 2–50)
ANION GAP SERPL CALC-SCNC: 7 MMOL/L (ref 0–11.9)
AST SERPL-CCNC: 32 U/L (ref 12–45)
BASOPHILS # BLD AUTO: 0.4 % (ref 0–1.8)
BASOPHILS # BLD: 0.03 K/UL (ref 0–0.12)
BILIRUB SERPL-MCNC: 1.1 MG/DL (ref 0.1–1.5)
BUN SERPL-MCNC: 14 MG/DL (ref 8–22)
CALCIUM SERPL-MCNC: 8.3 MG/DL (ref 8.4–10.2)
CHLORIDE SERPL-SCNC: 106 MMOL/L (ref 96–112)
CO2 SERPL-SCNC: 25 MMOL/L (ref 20–33)
CREAT SERPL-MCNC: 1.1 MG/DL (ref 0.5–1.4)
EOSINOPHIL # BLD AUTO: 0.11 K/UL (ref 0–0.51)
EOSINOPHIL NFR BLD: 1.5 % (ref 0–6.9)
ERYTHROCYTE [DISTWIDTH] IN BLOOD BY AUTOMATED COUNT: 42.4 FL (ref 35.9–50)
GLOBULIN SER CALC-MCNC: 2.9 G/DL (ref 1.9–3.5)
GLUCOSE SERPL-MCNC: 95 MG/DL (ref 65–99)
HCT VFR BLD AUTO: 44.9 % (ref 42–52)
HGB BLD-MCNC: 13.6 G/DL (ref 14–18)
IMM GRANULOCYTES # BLD AUTO: 0.04 K/UL (ref 0–0.11)
IMM GRANULOCYTES NFR BLD AUTO: 0.5 % (ref 0–0.9)
LYMPHOCYTES # BLD AUTO: 3.24 K/UL (ref 1–4.8)
LYMPHOCYTES NFR BLD: 42.7 % (ref 22–41)
MCH RBC QN AUTO: 27.4 PG (ref 27–33)
MCHC RBC AUTO-ENTMCNC: 30.3 G/DL (ref 33.7–35.3)
MCV RBC AUTO: 90.3 FL (ref 81.4–97.8)
MONOCYTES # BLD AUTO: 0.63 K/UL (ref 0–0.85)
MONOCYTES NFR BLD AUTO: 8.3 % (ref 0–13.4)
NEUTROPHILS # BLD AUTO: 3.53 K/UL (ref 1.82–7.42)
NEUTROPHILS NFR BLD: 46.6 % (ref 44–72)
NRBC # BLD AUTO: 0 K/UL
NRBC BLD-RTO: 0 /100 WBC
PLATELET # BLD AUTO: 306 K/UL (ref 164–446)
PMV BLD AUTO: 9.5 FL (ref 9–12.9)
POTASSIUM SERPL-SCNC: 4.6 MMOL/L (ref 3.6–5.5)
PROT SERPL-MCNC: 6.4 G/DL (ref 6–8.2)
RBC # BLD AUTO: 4.97 M/UL (ref 4.7–6.1)
SODIUM SERPL-SCNC: 138 MMOL/L (ref 135–145)
WBC # BLD AUTO: 7.6 K/UL (ref 4.8–10.8)

## 2018-11-12 PROCEDURE — 99232 SBSQ HOSP IP/OBS MODERATE 35: CPT | Performed by: INTERNAL MEDICINE

## 2018-11-12 PROCEDURE — 700102 HCHG RX REV CODE 250 W/ 637 OVERRIDE(OP): Performed by: HOSPITALIST

## 2018-11-12 PROCEDURE — 700105 HCHG RX REV CODE 258: Performed by: HOSPITALIST

## 2018-11-12 PROCEDURE — 85025 COMPLETE CBC W/AUTO DIFF WBC: CPT

## 2018-11-12 PROCEDURE — A9270 NON-COVERED ITEM OR SERVICE: HCPCS | Performed by: HOSPITALIST

## 2018-11-12 PROCEDURE — 80053 COMPREHEN METABOLIC PANEL: CPT

## 2018-11-12 PROCEDURE — 700111 HCHG RX REV CODE 636 W/ 250 OVERRIDE (IP): Performed by: INTERNAL MEDICINE

## 2018-11-12 PROCEDURE — 770006 HCHG ROOM/CARE - MED/SURG/GYN SEMI*

## 2018-11-12 PROCEDURE — 36415 COLL VENOUS BLD VENIPUNCTURE: CPT

## 2018-11-12 PROCEDURE — 99406 BEHAV CHNG SMOKING 3-10 MIN: CPT

## 2018-11-12 PROCEDURE — 700111 HCHG RX REV CODE 636 W/ 250 OVERRIDE (IP): Performed by: HOSPITALIST

## 2018-11-12 PROCEDURE — 700111 HCHG RX REV CODE 636 W/ 250 OVERRIDE (IP): Performed by: EMERGENCY MEDICINE

## 2018-11-12 RX ORDER — AMLODIPINE BESYLATE 5 MG/1
10 TABLET ORAL
Status: DISCONTINUED | OUTPATIENT
Start: 2018-11-12 | End: 2018-11-13

## 2018-11-12 RX ORDER — HYDROMORPHONE HYDROCHLORIDE 1 MG/ML
.5-1 INJECTION, SOLUTION INTRAMUSCULAR; INTRAVENOUS; SUBCUTANEOUS
Status: DISCONTINUED | OUTPATIENT
Start: 2018-11-12 | End: 2018-11-15 | Stop reason: HOSPADM

## 2018-11-12 RX ADMIN — TAMSULOSIN HYDROCHLORIDE 0.4 MG: 0.4 CAPSULE ORAL at 08:51

## 2018-11-12 RX ADMIN — STANDARDIZED SENNA CONCENTRATE AND DOCUSATE SODIUM 2 TABLET: 8.6; 5 TABLET, FILM COATED ORAL at 17:02

## 2018-11-12 RX ADMIN — OXYCODONE HYDROCHLORIDE AND ACETAMINOPHEN 2 TABLET: 10; 325 TABLET ORAL at 17:02

## 2018-11-12 RX ADMIN — OXYCODONE HYDROCHLORIDE AND ACETAMINOPHEN 2 TABLET: 10; 325 TABLET ORAL at 08:51

## 2018-11-12 RX ADMIN — OXYCODONE HYDROCHLORIDE 5 MG: 5 TABLET ORAL at 06:00

## 2018-11-12 RX ADMIN — MORPHINE SULFATE 4 MG: 4 INJECTION INTRAVENOUS at 03:06

## 2018-11-12 RX ADMIN — SODIUM CHLORIDE: 9 INJECTION, SOLUTION INTRAVENOUS at 06:44

## 2018-11-12 RX ADMIN — OXYCODONE HYDROCHLORIDE 5 MG: 5 TABLET ORAL at 14:29

## 2018-11-12 RX ADMIN — HYDROMORPHONE HYDROCHLORIDE 0.25 MG: 1 INJECTION, SOLUTION INTRAMUSCULAR; INTRAVENOUS; SUBCUTANEOUS at 07:15

## 2018-11-12 RX ADMIN — STANDARDIZED SENNA CONCENTRATE AND DOCUSATE SODIUM 2 TABLET: 8.6; 5 TABLET, FILM COATED ORAL at 06:01

## 2018-11-12 RX ADMIN — HYDROMORPHONE HYDROCHLORIDE 1 MG: 1 INJECTION, SOLUTION INTRAMUSCULAR; INTRAVENOUS; SUBCUTANEOUS at 21:23

## 2018-11-12 RX ADMIN — HYDROMORPHONE HYDROCHLORIDE 1 MG: 1 INJECTION, SOLUTION INTRAMUSCULAR; INTRAVENOUS; SUBCUTANEOUS at 15:33

## 2018-11-12 RX ADMIN — SODIUM CHLORIDE: 9 INJECTION, SOLUTION INTRAVENOUS at 20:17

## 2018-11-12 ASSESSMENT — ENCOUNTER SYMPTOMS
NAUSEA: 0
FEVER: 0
PALPITATIONS: 0
DIZZINESS: 0
HEADACHES: 0
SORE THROAT: 0
ABDOMINAL PAIN: 0
DIARRHEA: 0
DEPRESSION: 0
COUGH: 0
FOCAL WEAKNESS: 0
BLOOD IN STOOL: 0
MYALGIAS: 0
VOMITING: 0
BACK PAIN: 1
SHORTNESS OF BREATH: 0
HEARTBURN: 0
HALLUCINATIONS: 0
CHILLS: 0
FLANK PAIN: 1
WEAKNESS: 0

## 2018-11-12 ASSESSMENT — PAIN SCALES - GENERAL
PAINLEVEL_OUTOF10: 3
PAINLEVEL_OUTOF10: 6
PAINLEVEL_OUTOF10: 9
PAINLEVEL_OUTOF10: 9
PAINLEVEL_OUTOF10: 8
PAINLEVEL_OUTOF10: 3
PAINLEVEL_OUTOF10: 8
PAINLEVEL_OUTOF10: 5

## 2018-11-12 NOTE — PROGRESS NOTES
Bedside report completed.  Assumed pt care. Patient in bed, resting, in no apparent distress.  Safety precautions in place. Call light & personal belongings within reach.  Plan of care discussed.  Patient is on room air, IV running NS @ 125 mL/hour.  Patient reports 9/10 L quadrant/flank pain that he states has not improved all night.  Patient states that he takes  of percocet q8 hours at home for chronic knee and back pain.  Medicated per MAR this morning with dilaudid.  BP elevated in conjunction with elevated pain level.  Will monitor BP and reassess when patient pain level decreases.  No other needs at this time.  Will continue to monitor.  Patient concerned that we are not treating his pain, states that he could manage pain at home.  Will discuss with  During rounds this AM.

## 2018-11-12 NOTE — PROGRESS NOTES
1910 Received report from [facility], POC discussed, kept safety measures in place.    2000 Pt is well alert and oriented, he denies having SOB nor nausea, he said pain is more tolerable after he was given morphine but it is still present in a scale of 5/10 in the left upper side of his abdomen and his left flank area. Discussed POC such as medications and tests. Encouraged pt to perform deep breathing exercises. Will continue to monitor pt in a timely manner.    2100 Pt said the pain becomes very uncomfortable and morphine only worked for a while. Rated pain in a scale of 8-9/10. PRN oxycodone given. RR is 19. Will re-assess pt's pain level in a timely manner.

## 2018-11-12 NOTE — DIETARY
NUTRITION SERVICES - Brief Wound POA Note  The pt is a 59 yo male with an admitting dx of pancreatitis.    Pt with wound POA noted per the nutrition admit screening. No wound observed in flow sheets, and no wound consult is pending at this time. Pt dons walking boot to R foot d/t hx of R ankle pain. He is currently receiving clear liquids diet since this morning. PO intake has been 0% x1 meal on this diet. No other PO records available. Pt has been reporting continued flank pain per nursing documentation. RD to monitor clinical course and provide nutrition interventions as appropriate.    Labs, meds, fluids, GI/, PO, skin, I/O's - reviewed.    Recommendations/Plan:  1. Diet advancement when medically feasible   2. Encourage intake of meals  3. Document intake of all meals as % taken in ADL's to provide interdisciplinary communication across all shifts.   4. Monitor weight.  5. Nutrition rep will continue to see patient for ongoing meal and snack preferences.     RD monitoring.

## 2018-11-12 NOTE — RESPIRATORY CARE
COPD EDUCATION by COPD CLINICAL EDUCATOR  11/12/2018 at 10:19 AM by Ayaka Santiago     Patient interviewed by COPD education team. Patient refused COPD/ Smoking cessation program at this time.

## 2018-11-12 NOTE — PROGRESS NOTES
Received from the er via alooma.pt ambulated to the bed.steady gait.  Pt does have a walking boot to rt foot.pt states he wears it d/t some bruising and pain he has had for awhile to that rt.ankle.  Pain 3/10 to luq.refuses anything for pain at present.  Updated pt on POC.went over all orders and meds with pt.

## 2018-11-12 NOTE — ASSESSMENT & PLAN NOTE
-New diagnosis, possibly somewhat increased due to pain  -No improvement with amlodipine, this was stopped and lisinopril was started, improved control however he may need lisinopril increased  -Adjust meds as needed  -Placed PRN labetalol

## 2018-11-12 NOTE — CARE PLAN
Problem: Nutritional:  Goal: Achieve adequate nutritional intake  Diet advancement +  PO intake >/= 50% of meals consistently  Outcome: NOT MET

## 2018-11-12 NOTE — PROGRESS NOTES
[facility] Hospitalist Progress Note    Date of Service: 2018    Chief Complaint  58 y.o. male with a history of kidney stones, BPH admitted 2018 with left flank and abdominal pain, found to have labs consistent with acute pancreatitis.    Interval Problem Update  : High lipase however clinically location is unexpected for pancreatitis.  CT negative for evidence of stone or hydronephrosis.  Continue IV fluids and pain control.      Consultants/Specialty  None    Disposition  Recommend outpatient urology follow-up vs inpatient consult if unable to control pain and suspicion for stone        Review of Systems   Constitutional: Negative for chills, fever and malaise/fatigue.   HENT: Negative for sore throat.    Respiratory: Negative for cough and shortness of breath.    Cardiovascular: Negative for chest pain and palpitations.   Gastrointestinal: Negative for abdominal pain, blood in stool, diarrhea, heartburn, nausea and vomiting.   Genitourinary: Positive for flank pain (Left). Negative for dysuria, frequency and hematuria.   Musculoskeletal: Positive for back pain (Chronic). Negative for myalgias.   Neurological: Negative for dizziness, focal weakness, weakness and headaches.   Psychiatric/Behavioral: Negative for depression and hallucinations.   All other systems reviewed and are negative.     Physical Exam  Laboratory/Imaging   Hemodynamics  Temp (24hrs), Av.6 °C (97.9 °F), Min:36.4 °C (97.6 °F), Max:36.7 °C (98 °F)   Temperature: 36.7 °C (98 °F)  Pulse  Av.3  Min: 72  Max: 88    Blood Pressure: (!) 175/107 (RN aware)      Respiratory      Respiration: 20, Pulse Oximetry: 98 %             Fluids    Intake/Output Summary (Last 24 hours) at 18 1409  Last data filed at 18 0800   Gross per 24 hour   Intake             4320 ml   Output                0 ml   Net             4320 ml       Nutrition  Orders Placed This Encounter   Procedures   • Diet Order Clear Liquids - No Red Foods      Standing Status:   Standing     Number of Occurrences:   1     Order Specific Question:   Diet:     Answer:   Clear Liquids - No Red Foods [12]     Physical Exam   Constitutional: He is oriented to person, place, and time. He appears well-developed and well-nourished. No distress.   HENT:   Head: Normocephalic.   Mouth/Throat: No oropharyngeal exudate.   Eyes: Pupils are equal, round, and reactive to light. Conjunctivae are normal.   Neck: Normal range of motion. No tracheal deviation present.   Cardiovascular: Normal rate and regular rhythm.    No murmur heard.  Pulmonary/Chest: Effort normal. No respiratory distress. He has no wheezes. He exhibits no tenderness.   Abdominal: Soft. He exhibits no distension. There is no tenderness. There is no guarding.   No epigastric tenderness   Musculoskeletal: Normal range of motion. He exhibits no edema or tenderness.   Lymphadenopathy:     He has no cervical adenopathy.   Neurological: He is alert and oriented to person, place, and time. No cranial nerve deficit.   Skin: Skin is warm and dry. No rash noted.   Psychiatric: He has a normal mood and affect. His behavior is normal.   Nursing note and vitals reviewed.      Recent Labs      11/11/18   1130  11/12/18   0511   WBC  8.1  7.6   RBC  5.66  4.97   HEMOGLOBIN  15.5  13.6*   HEMATOCRIT  49.5  44.9   MCV  87.5  90.3   MCH  27.4  27.4   MCHC  31.3*  30.3*   RDW  40.3  42.4   PLATELETCT  349  306   MPV  9.0  9.5     Recent Labs      11/11/18   1130  11/12/18   0511   SODIUM  136  138   POTASSIUM  3.9  4.6   CHLORIDE  101  106   CO2  28  25   GLUCOSE  98  95   BUN  15  14   CREATININE  1.12  1.10   CALCIUM  8.7  8.3*             Recent Labs      11/11/18   1130   TRIGLYCERIDE  93   HDL  66   LDL  95          Assessment/Plan     * Pancreatitis   Assessment & Plan    Unknown etiology, patient denies alcohol.  No medications as obvious etiology, no recent abdominal trauma.  No mention of pancreatic inflammation on CT  scan.  Lipase: 332,   IV fluids  Pain control  Lipid panel pending.    May consider outpatient GI referral, if reoccurs to rule out autoimmune causes.  CT a/p no stones or abnormalities.   -Continue supportive care with IV pain medications until he is taking p.o., continue IV fluids.     Elevated blood pressure reading   Assessment & Plan    Add amlodipine, monitor     Left flank pain   Assessment & Plan    Despite the patient having laboratory finding consistent with acute pancreatitis, he describes his pain in the left flank radiating to left lower quadrant and left groin.  This is highly suspicious of a kidney stone.  Abdominal CT was performed and showed no evidence of kidney stone.  Patient states he has had a stone in the past that was also not seen on CT scan.  His kidney function is normal.  -IV fluids  -Pain control  -Recommend outpatient urology follow-up to discuss possible recurrent kidney stones  -Monitor creatinine and urine output     BPH (benign prostatic hyperplasia)   Assessment & Plan    Resume home dose of silodosin     Gout   Assessment & Plan    Hx of gout  Continue prn colchicine or indomethacin       Quality-Core Measures   Reviewed items::  Labs reviewed, Medications reviewed and Radiology images reviewed  Brian catheter::  No Brian

## 2018-11-12 NOTE — CARE PLAN
Problem: Communication  Goal: The ability to communicate needs accurately and effectively will improve  Outcome: PROGRESSING AS EXPECTED    Intervention: [place] patient and significant other/support system to call light to alert staff of needs  Patient oriented to surroundings and unit policies/routines.  Educated and understands the use of the call button.  Patient calls appropriately.      Problem: Safety  Goal: Will remain free from falls  Outcome: PROGRESSING AS EXPECTED    Intervention: Implement fall precautions  Patient educated and understands the fall precautions in place to prevent falls.  Bed alarm is off, patient calls appropriately, IV pole closest to bathroom, treaded slipper socks on, and bedrails closest to bathroom down.  Patient also educated and understands the use of the call button for any assistance with mobility.

## 2018-11-12 NOTE — ASSESSMENT & PLAN NOTE
- now more left lower back to groin and testicle  - possibly stone but none noted on CT, await scope per urology  - nor is there hydronephrosis/hydroureter

## 2018-11-13 PROBLEM — N20.0 KIDNEY STONES: Status: ACTIVE | Noted: 2018-11-13

## 2018-11-13 PROBLEM — I10 HTN (HYPERTENSION): Status: ACTIVE | Noted: 2018-11-12

## 2018-11-13 PROBLEM — M10.9 GOUT: Status: ACTIVE | Noted: 2018-11-11

## 2018-11-13 LAB
ALBUMIN SERPL BCP-MCNC: 3.7 G/DL (ref 3.2–4.9)
ALBUMIN/GLOB SERPL: 1.4 G/DL
ALP SERPL-CCNC: 86 U/L (ref 30–99)
ALT SERPL-CCNC: 45 U/L (ref 2–50)
ANION GAP SERPL CALC-SCNC: 5 MMOL/L (ref 0–11.9)
APPEARANCE UR: CLEAR
AST SERPL-CCNC: 42 U/L (ref 12–45)
BASOPHILS # BLD AUTO: 0.4 % (ref 0–1.8)
BASOPHILS # BLD: 0.03 K/UL (ref 0–0.12)
BILIRUB SERPL-MCNC: 2 MG/DL (ref 0.1–1.5)
BILIRUB UR QL STRIP.AUTO: NEGATIVE
BUN SERPL-MCNC: 6 MG/DL (ref 8–22)
CALCIUM SERPL-MCNC: 8.6 MG/DL (ref 8.4–10.2)
CHLORIDE SERPL-SCNC: 103 MMOL/L (ref 96–112)
CO2 SERPL-SCNC: 30 MMOL/L (ref 20–33)
COLOR UR: YELLOW
CREAT SERPL-MCNC: 0.97 MG/DL (ref 0.5–1.4)
EOSINOPHIL # BLD AUTO: 0.12 K/UL (ref 0–0.51)
EOSINOPHIL NFR BLD: 1.8 % (ref 0–6.9)
ERYTHROCYTE [DISTWIDTH] IN BLOOD BY AUTOMATED COUNT: 41.2 FL (ref 35.9–50)
GLOBULIN SER CALC-MCNC: 2.6 G/DL (ref 1.9–3.5)
GLUCOSE SERPL-MCNC: 103 MG/DL (ref 65–99)
GLUCOSE UR STRIP.AUTO-MCNC: NEGATIVE MG/DL
HCT VFR BLD AUTO: 42.9 % (ref 42–52)
HGB BLD-MCNC: 13.3 G/DL (ref 14–18)
IMM GRANULOCYTES # BLD AUTO: 0.02 K/UL (ref 0–0.11)
IMM GRANULOCYTES NFR BLD AUTO: 0.3 % (ref 0–0.9)
KETONES UR STRIP.AUTO-MCNC: NEGATIVE MG/DL
LEUKOCYTE ESTERASE UR QL STRIP.AUTO: NEGATIVE
LYMPHOCYTES # BLD AUTO: 2.06 K/UL (ref 1–4.8)
LYMPHOCYTES NFR BLD: 30.5 % (ref 22–41)
MCH RBC QN AUTO: 27.5 PG (ref 27–33)
MCHC RBC AUTO-ENTMCNC: 31 G/DL (ref 33.7–35.3)
MCV RBC AUTO: 88.6 FL (ref 81.4–97.8)
MICRO URNS: NORMAL
MONOCYTES # BLD AUTO: 0.64 K/UL (ref 0–0.85)
MONOCYTES NFR BLD AUTO: 9.5 % (ref 0–13.4)
NEUTROPHILS # BLD AUTO: 3.88 K/UL (ref 1.82–7.42)
NEUTROPHILS NFR BLD: 57.5 % (ref 44–72)
NITRITE UR QL STRIP.AUTO: NEGATIVE
NRBC # BLD AUTO: 0 K/UL
NRBC BLD-RTO: 0 /100 WBC
PH UR STRIP.AUTO: 7 [PH]
PLATELET # BLD AUTO: 303 K/UL (ref 164–446)
PMV BLD AUTO: 9.4 FL (ref 9–12.9)
POTASSIUM SERPL-SCNC: 4 MMOL/L (ref 3.6–5.5)
PROT SERPL-MCNC: 6.3 G/DL (ref 6–8.2)
PROT UR QL STRIP: NEGATIVE MG/DL
RBC # BLD AUTO: 4.84 M/UL (ref 4.7–6.1)
RBC UR QL AUTO: NEGATIVE
SODIUM SERPL-SCNC: 138 MMOL/L (ref 135–145)
SP GR UR STRIP.AUTO: 1.01
WBC # BLD AUTO: 6.8 K/UL (ref 4.8–10.8)

## 2018-11-13 PROCEDURE — 700102 HCHG RX REV CODE 250 W/ 637 OVERRIDE(OP): Performed by: INTERNAL MEDICINE

## 2018-11-13 PROCEDURE — A9270 NON-COVERED ITEM OR SERVICE: HCPCS | Performed by: HOSPITALIST

## 2018-11-13 PROCEDURE — 770006 HCHG ROOM/CARE - MED/SURG/GYN SEMI*

## 2018-11-13 PROCEDURE — A9270 NON-COVERED ITEM OR SERVICE: HCPCS | Performed by: INTERNAL MEDICINE

## 2018-11-13 PROCEDURE — 80053 COMPREHEN METABOLIC PANEL: CPT

## 2018-11-13 PROCEDURE — 700105 HCHG RX REV CODE 258: Performed by: HOSPITALIST

## 2018-11-13 PROCEDURE — 99232 SBSQ HOSP IP/OBS MODERATE 35: CPT | Performed by: INTERNAL MEDICINE

## 2018-11-13 PROCEDURE — 36415 COLL VENOUS BLD VENIPUNCTURE: CPT

## 2018-11-13 PROCEDURE — 700111 HCHG RX REV CODE 636 W/ 250 OVERRIDE (IP): Performed by: INTERNAL MEDICINE

## 2018-11-13 PROCEDURE — 85025 COMPLETE CBC W/AUTO DIFF WBC: CPT

## 2018-11-13 PROCEDURE — 81003 URINALYSIS AUTO W/O SCOPE: CPT

## 2018-11-13 PROCEDURE — 700102 HCHG RX REV CODE 250 W/ 637 OVERRIDE(OP): Performed by: HOSPITALIST

## 2018-11-13 RX ORDER — LISINOPRIL 5 MG/1
10 TABLET ORAL DAILY
Status: DISCONTINUED | OUTPATIENT
Start: 2018-11-14 | End: 2018-11-15 | Stop reason: HOSPADM

## 2018-11-13 RX ORDER — KETOROLAC TROMETHAMINE 30 MG/ML
30 INJECTION, SOLUTION INTRAMUSCULAR; INTRAVENOUS EVERY 6 HOURS PRN
Status: DISCONTINUED | OUTPATIENT
Start: 2018-11-13 | End: 2018-11-15 | Stop reason: HOSPADM

## 2018-11-13 RX ORDER — LISINOPRIL 5 MG/1
10 TABLET ORAL ONCE
Status: COMPLETED | OUTPATIENT
Start: 2018-11-13 | End: 2018-11-13

## 2018-11-13 RX ORDER — LABETALOL HYDROCHLORIDE 5 MG/ML
10 INJECTION, SOLUTION INTRAVENOUS EVERY 4 HOURS PRN
Status: DISCONTINUED | OUTPATIENT
Start: 2018-11-13 | End: 2018-11-15 | Stop reason: HOSPADM

## 2018-11-13 RX ORDER — QUININE SULFATE 324 MG/1
324 CAPSULE ORAL 2 TIMES DAILY PRN
Status: DISCONTINUED | OUTPATIENT
Start: 2018-11-13 | End: 2018-11-13

## 2018-11-13 RX ORDER — OXYCODONE AND ACETAMINOPHEN 10; 325 MG/1; MG/1
1-2 TABLET ORAL EVERY 4 HOURS PRN
Status: DISCONTINUED | OUTPATIENT
Start: 2018-11-13 | End: 2018-11-15 | Stop reason: HOSPADM

## 2018-11-13 RX ADMIN — OXYCODONE HYDROCHLORIDE AND ACETAMINOPHEN 2 TABLET: 10; 325 TABLET ORAL at 18:48

## 2018-11-13 RX ADMIN — STANDARDIZED SENNA CONCENTRATE AND DOCUSATE SODIUM 2 TABLET: 8.6; 5 TABLET, FILM COATED ORAL at 05:15

## 2018-11-13 RX ADMIN — SODIUM CHLORIDE: 9 INJECTION, SOLUTION INTRAVENOUS at 18:48

## 2018-11-13 RX ADMIN — SODIUM CHLORIDE: 9 INJECTION, SOLUTION INTRAVENOUS at 12:20

## 2018-11-13 RX ADMIN — STANDARDIZED SENNA CONCENTRATE AND DOCUSATE SODIUM 2 TABLET: 8.6; 5 TABLET, FILM COATED ORAL at 18:54

## 2018-11-13 RX ADMIN — LISINOPRIL 10 MG: 5 TABLET ORAL at 19:32

## 2018-11-13 RX ADMIN — HYDROMORPHONE HYDROCHLORIDE 1 MG: 1 INJECTION, SOLUTION INTRAMUSCULAR; INTRAVENOUS; SUBCUTANEOUS at 06:33

## 2018-11-13 RX ADMIN — KETOROLAC TROMETHAMINE 30 MG: 30 INJECTION, SOLUTION INTRAMUSCULAR at 12:18

## 2018-11-13 RX ADMIN — OXYCODONE HYDROCHLORIDE AND ACETAMINOPHEN 2 TABLET: 10; 325 TABLET ORAL at 09:01

## 2018-11-13 RX ADMIN — OXYCODONE HYDROCHLORIDE AND ACETAMINOPHEN 2 TABLET: 10; 325 TABLET ORAL at 13:50

## 2018-11-13 RX ADMIN — SODIUM CHLORIDE: 9 INJECTION, SOLUTION INTRAVENOUS at 03:33

## 2018-11-13 RX ADMIN — TAMSULOSIN HYDROCHLORIDE 0.4 MG: 0.4 CAPSULE ORAL at 08:59

## 2018-11-13 RX ADMIN — OXYCODONE HYDROCHLORIDE AND ACETAMINOPHEN 2 TABLET: 10; 325 TABLET ORAL at 01:16

## 2018-11-13 ASSESSMENT — ENCOUNTER SYMPTOMS
DIARRHEA: 0
CONSTIPATION: 0
NAUSEA: 0
STRIDOR: 0
COUGH: 0
HEADACHES: 0
CHILLS: 0
DIZZINESS: 0
PALPITATIONS: 0
LOSS OF CONSCIOUSNESS: 0
DEPRESSION: 0
TINGLING: 0
VOMITING: 0
FLANK PAIN: 0
WEAKNESS: 0
SPUTUM PRODUCTION: 0
BACK PAIN: 1
FEVER: 0
MYALGIAS: 0
SHORTNESS OF BREATH: 0
ABDOMINAL PAIN: 0
FALLS: 0

## 2018-11-13 ASSESSMENT — PAIN SCALES - GENERAL
PAINLEVEL_OUTOF10: 7
PAINLEVEL_OUTOF10: 7
PAINLEVEL_OUTOF10: 5
PAINLEVEL_OUTOF10: 3
PAINLEVEL_OUTOF10: 8
PAINLEVEL_OUTOF10: 4
PAINLEVEL_OUTOF10: 8

## 2018-11-13 NOTE — PROGRESS NOTES
"Patient states he would like to have his quinine restarted for leg cramps.  Patient states he can wait for rounds today to ask MD. Will pass on information to next shift for rounds.  Patient also refusing Norvasc this morning and states \"my blood pressure is low when I am not here\".    "

## 2018-11-13 NOTE — PROGRESS NOTES
[facility] Hospitalist Progress Note    Date of Service: 2018    Chief Complaint  58 y.o. male admitted 2018 with left flank and left abdominal pain.    Interval Problem Update  Upon arrival, patient was noted to have an elevated lipase and diagnosed with pancreatitis.  Patient continues to have pain, located left lower back as well as left groin and left testicle.  Pain is worse with urination.  Patient does have a history of kidney stones, says at one point was admitted to the urethra, states his symptoms are currently the same as that time.    Consultants/Specialty  Urology    Disposition  Patient requires additional treatment in the hospital, should be able to go home without needs at the time of discharge        Review of Systems   Constitutional: Negative for chills, fever and malaise/fatigue.   HENT: Negative for congestion.    Respiratory: Negative for cough, sputum production, shortness of breath and stridor.    Cardiovascular: Negative for chest pain, palpitations and leg swelling.   Gastrointestinal: Negative for abdominal pain, constipation, diarrhea, nausea and vomiting.   Genitourinary: Positive for dysuria. Negative for flank pain, hematuria and urgency.        Testicular pain   Musculoskeletal: Positive for back pain (left lower). Negative for falls and myalgias.   Neurological: Negative for dizziness, tingling, loss of consciousness, weakness and headaches.   Psychiatric/Behavioral: Negative for depression and suicidal ideas.   All other systems reviewed and are negative.     Physical Exam  Laboratory/Imaging   Hemodynamics  Temp (24hrs), Av.7 °C (98 °F), Min:36.6 °C (97.9 °F), Max:36.7 °C (98.1 °F)   Temperature: 36.7 °C (98.1 °F)  Pulse  Av.5  Min: 72  Max: 88    Blood Pressure: 148/98      Respiratory      Respiration: 18, Pulse Oximetry: 100 %             Fluids    Intake/Output Summary (Last 24 hours) at 18 0704  Last data filed at 18 0645   Gross per 24 hour    Intake             6755 ml   Output             1930 ml   Net             4825 ml       Nutrition  Orders Placed This Encounter   Procedures   • Diet Order Clear Liquids - No Red Foods     Standing Status:   Standing     Number of Occurrences:   1     Order Specific Question:   Diet:     Answer:   Clear Liquids - No Red Foods [12]     Physical Exam   Constitutional: He is oriented to person, place, and time. He appears well-developed and well-nourished. No distress.   HENT:   Head: Normocephalic.   Mouth/Throat: No oropharyngeal exudate.   Eyes: Pupils are equal, round, and reactive to light. Conjunctivae are normal.   Neck: Normal range of motion. No tracheal deviation present.   Cardiovascular: Normal rate and regular rhythm.    No murmur heard.  Pulmonary/Chest: Effort normal. No respiratory distress. He has no wheezes. He exhibits no tenderness.   Abdominal: Soft. He exhibits no distension. There is no tenderness. There is no guarding.   No epigastric tenderness   Musculoskeletal: Normal range of motion. He exhibits no edema or tenderness.   Lymphadenopathy:     He has no cervical adenopathy.   Neurological: He is alert and oriented to person, place, and time. No cranial nerve deficit.   Skin: Skin is warm and dry. No rash noted.   Psychiatric: He has a normal mood and affect. His behavior is normal.   Nursing note and vitals reviewed.      Recent Labs      11/11/18   1130  11/12/18   0511  11/13/18   0459   WBC  8.1  7.6  6.8   RBC  5.66  4.97  4.84   HEMOGLOBIN  15.5  13.6*  13.3*   HEMATOCRIT  49.5  44.9  42.9   MCV  87.5  90.3  88.6   MCH  27.4  27.4  27.5   MCHC  31.3*  30.3*  31.0*   RDW  40.3  42.4  41.2   PLATELETCT  349  306  303   MPV  9.0  9.5  9.4     Recent Labs      11/11/18   1130  11/12/18   0511  11/13/18   0459   SODIUM  136  138  138   POTASSIUM  3.9  4.6  4.0   CHLORIDE  101  106  103   CO2  28  25  30   GLUCOSE  98  95  103*   BUN  15  14  6*   CREATININE  1.12  1.10  0.97   CALCIUM  8.7   8.3*  8.6             Recent Labs      11/11/18   1130   TRIGLYCERIDE  93   HDL  66   LDL  95          Assessment/Plan     * Pancreatitis   Assessment & Plan    -No epigastric pain  -I do not feel this is pancreatitis at this time, possibly upon arrival but no longer  -Pain is left lower back as well as left groin and left testicle  -Patient will be n.p.o. for a urologic procedure however after that can have a regular diet     Kidney stones   Assessment & Plan    -History, now with symptoms similar to previous stones  - repeat UA  - add toradol  -discussed with Dr La, recommended NPO and scope when he can     HTN (hypertension)   Assessment & Plan    -New diagnosis, possibly somewhat increased due to pain  -No improvement with amlodipine, will stop and start lisinopril  -Adjust meds as needed  -Place PRN labetalol     Left flank pain   Assessment & Plan    - now more left lower back to groin and testicle  - possibly stone but none noted on CT, await scope  - nor is there hydronephrosis/hydroureter     BPH (benign prostatic hyperplasia)   Assessment & Plan    -On silodosin at home, now on Flomax  -Continue, no problems urinating     Gout   Assessment & Plan    -Patient is on indomethacin and colchicine at home, both are PRN  -Currently with no symptoms of gout       Quality-Core Measures   Reviewed items::  Labs reviewed, Medications reviewed and Radiology images reviewed  Brian catheter::  No Brian

## 2018-11-13 NOTE — CARE PLAN
Problem: Venous Thromboembolism (VTW)/Deep Vein Thrombosis (DVT) Prevention:  Goal: Patient will participate in Venous Thrombosis (VTE)/Deep Vein Thrombosis (DVT)Prevention Measures  Outcome: PROGRESSING AS EXPECTED  Patient up to the bathroom frequently to void.  Will encourage use of SCDs.    Problem: Pain Management  Goal: Pain level will decrease to patient's comfort goal  Outcome: PROGRESSING AS EXPECTED   11/12/18 2120 11/12/18 2305   OTHER   Pain Scale 0 - 10  8 --    Non Verbal Scale  --  Calm;Sleeping;Unlabored Breathing   Comfort Goal Comfort at Rest;Comfort with Movement;Sleep Comfortably --    Patient taking Percocet 2 tabs every 8 hours with Dilaudid 1mg IV for breakthrough pain.

## 2018-11-13 NOTE — PROGRESS NOTES
Pt resting in bed. Awake and alert. No signs of distress noted. VSS. Pain tolerable at this time, but wants pain meds on time. Discussed POC.

## 2018-11-13 NOTE — PROGRESS NOTES
"Patient had small clear emesis r/t bad odor from trash can.  Trash can emptied and patient states he \"is better\".  Encouraged patient to call for further nausea.    "

## 2018-11-13 NOTE — ASSESSMENT & PLAN NOTE
-History, now with symptoms similar to previous stones  - repeat UA with no blood  - added toradol to narcotics  -discussed with Dr La who did see the patient overnight, patient did not want a cystoscopy unless the problem could be treated, await additional recommendations per urology

## 2018-11-14 ENCOUNTER — APPOINTMENT (OUTPATIENT)
Dept: RADIOLOGY | Facility: MEDICAL CENTER | Age: 58
DRG: 697 | End: 2018-11-14
Attending: UROLOGY
Payer: COMMERCIAL

## 2018-11-14 PROCEDURE — 700111 HCHG RX REV CODE 636 W/ 250 OVERRIDE (IP)

## 2018-11-14 PROCEDURE — 700111 HCHG RX REV CODE 636 W/ 250 OVERRIDE (IP): Performed by: INTERNAL MEDICINE

## 2018-11-14 PROCEDURE — 160035 HCHG PACU - 1ST 60 MINS PHASE I: Performed by: UROLOGY

## 2018-11-14 PROCEDURE — 160041 HCHG SURGERY MINUTES - EA ADDL 1 MIN LEVEL 4: Performed by: UROLOGY

## 2018-11-14 PROCEDURE — 700102 HCHG RX REV CODE 250 W/ 637 OVERRIDE(OP): Performed by: HOSPITALIST

## 2018-11-14 PROCEDURE — 770006 HCHG ROOM/CARE - MED/SURG/GYN SEMI*

## 2018-11-14 PROCEDURE — 160029 HCHG SURGERY MINUTES - 1ST 30 MINS LEVEL 4: Performed by: UROLOGY

## 2018-11-14 PROCEDURE — 94760 N-INVAS EAR/PLS OXIMETRY 1: CPT

## 2018-11-14 PROCEDURE — 160048 HCHG OR STATISTICAL LEVEL 1-5: Performed by: UROLOGY

## 2018-11-14 PROCEDURE — A4338 INDWELLING CATHETER LATEX: HCPCS | Performed by: UROLOGY

## 2018-11-14 PROCEDURE — 160009 HCHG ANES TIME/MIN: Performed by: UROLOGY

## 2018-11-14 PROCEDURE — 700105 HCHG RX REV CODE 258: Performed by: HOSPITALIST

## 2018-11-14 PROCEDURE — A9270 NON-COVERED ITEM OR SERVICE: HCPCS | Performed by: HOSPITALIST

## 2018-11-14 PROCEDURE — 500042 HCHG BAG, URINARY DRAINAGE (CLOSED): Performed by: UROLOGY

## 2018-11-14 PROCEDURE — 500892 HCHG PACK, PERI-GYN: Performed by: UROLOGY

## 2018-11-14 PROCEDURE — 0T7D8ZZ DILATION OF URETHRA, VIA NATURAL OR ARTIFICIAL OPENING ENDOSCOPIC: ICD-10-PCS | Performed by: UROLOGY

## 2018-11-14 PROCEDURE — A4346 CATH INDW FOLEY 3 WAY: HCPCS | Performed by: UROLOGY

## 2018-11-14 PROCEDURE — 501329 HCHG SET, CYSTO IRRIG Y TUR: Performed by: UROLOGY

## 2018-11-14 PROCEDURE — 99232 SBSQ HOSP IP/OBS MODERATE 35: CPT | Performed by: INTERNAL MEDICINE

## 2018-11-14 PROCEDURE — 500880 HCHG PACK, CYSTO W/SEP LEGGINGS: Performed by: UROLOGY

## 2018-11-14 PROCEDURE — A9270 NON-COVERED ITEM OR SERVICE: HCPCS | Performed by: INTERNAL MEDICINE

## 2018-11-14 PROCEDURE — 160002 HCHG RECOVERY MINUTES (STAT): Performed by: UROLOGY

## 2018-11-14 PROCEDURE — 700101 HCHG RX REV CODE 250

## 2018-11-14 PROCEDURE — 501330 HCHG SET, CYSTO IRRIG TUBING: Performed by: UROLOGY

## 2018-11-14 PROCEDURE — 700102 HCHG RX REV CODE 250 W/ 637 OVERRIDE(OP): Performed by: INTERNAL MEDICINE

## 2018-11-14 RX ORDER — HYDROMORPHONE HYDROCHLORIDE 1 MG/ML
0.4 INJECTION, SOLUTION INTRAMUSCULAR; INTRAVENOUS; SUBCUTANEOUS
Status: DISCONTINUED | OUTPATIENT
Start: 2018-11-14 | End: 2018-11-15 | Stop reason: HOSPADM

## 2018-11-14 RX ORDER — OXYCODONE HYDROCHLORIDE 5 MG/1
5 TABLET ORAL
Status: DISCONTINUED | OUTPATIENT
Start: 2018-11-14 | End: 2018-11-15 | Stop reason: HOSPADM

## 2018-11-14 RX ORDER — SODIUM CHLORIDE, SODIUM LACTATE, POTASSIUM CHLORIDE, CALCIUM CHLORIDE 600; 310; 30; 20 MG/100ML; MG/100ML; MG/100ML; MG/100ML
INJECTION, SOLUTION INTRAVENOUS CONTINUOUS
Status: DISCONTINUED | OUTPATIENT
Start: 2018-11-14 | End: 2018-11-15 | Stop reason: HOSPADM

## 2018-11-14 RX ORDER — HALOPERIDOL 5 MG/ML
1 INJECTION INTRAMUSCULAR
Status: DISCONTINUED | OUTPATIENT
Start: 2018-11-14 | End: 2018-11-15 | Stop reason: HOSPADM

## 2018-11-14 RX ORDER — ONDANSETRON 2 MG/ML
4 INJECTION INTRAMUSCULAR; INTRAVENOUS
Status: DISCONTINUED | OUTPATIENT
Start: 2018-11-14 | End: 2018-11-15 | Stop reason: HOSPADM

## 2018-11-14 RX ORDER — HYDROMORPHONE HYDROCHLORIDE 1 MG/ML
0.2 INJECTION, SOLUTION INTRAMUSCULAR; INTRAVENOUS; SUBCUTANEOUS
Status: DISCONTINUED | OUTPATIENT
Start: 2018-11-14 | End: 2018-11-15 | Stop reason: HOSPADM

## 2018-11-14 RX ORDER — OXYCODONE HYDROCHLORIDE 10 MG/1
10 TABLET ORAL
Status: DISCONTINUED | OUTPATIENT
Start: 2018-11-14 | End: 2018-11-15 | Stop reason: HOSPADM

## 2018-11-14 RX ORDER — OXYCODONE HCL 5 MG/5 ML
10 SOLUTION, ORAL ORAL
Status: COMPLETED | OUTPATIENT
Start: 2018-11-14 | End: 2018-11-14

## 2018-11-14 RX ORDER — HYDROMORPHONE HYDROCHLORIDE 1 MG/ML
0.1 INJECTION, SOLUTION INTRAMUSCULAR; INTRAVENOUS; SUBCUTANEOUS
Status: DISCONTINUED | OUTPATIENT
Start: 2018-11-14 | End: 2018-11-15 | Stop reason: HOSPADM

## 2018-11-14 RX ORDER — TAMSULOSIN HYDROCHLORIDE 0.4 MG/1
0.8 CAPSULE ORAL
Status: DISCONTINUED | OUTPATIENT
Start: 2018-11-15 | End: 2018-11-15 | Stop reason: HOSPADM

## 2018-11-14 RX ORDER — DIPHENHYDRAMINE HYDROCHLORIDE 50 MG/ML
12.5 INJECTION INTRAMUSCULAR; INTRAVENOUS
Status: DISCONTINUED | OUTPATIENT
Start: 2018-11-14 | End: 2018-11-15 | Stop reason: HOSPADM

## 2018-11-14 RX ORDER — OXYCODONE HCL 5 MG/5 ML
5 SOLUTION, ORAL ORAL
Status: COMPLETED | OUTPATIENT
Start: 2018-11-14 | End: 2018-11-14

## 2018-11-14 RX ORDER — MEPERIDINE HYDROCHLORIDE 25 MG/ML
6.25 INJECTION INTRAMUSCULAR; INTRAVENOUS; SUBCUTANEOUS
Status: DISCONTINUED | OUTPATIENT
Start: 2018-11-14 | End: 2018-11-15 | Stop reason: HOSPADM

## 2018-11-14 RX ADMIN — SODIUM CHLORIDE: 9 INJECTION, SOLUTION INTRAVENOUS at 22:30

## 2018-11-14 RX ADMIN — OXYCODONE HYDROCHLORIDE AND ACETAMINOPHEN 2 TABLET: 10; 325 TABLET ORAL at 11:56

## 2018-11-14 RX ADMIN — OXYCODONE HYDROCHLORIDE AND ACETAMINOPHEN 2 TABLET: 10; 325 TABLET ORAL at 23:40

## 2018-11-14 RX ADMIN — STANDARDIZED SENNA CONCENTRATE AND DOCUSATE SODIUM 2 TABLET: 8.6; 5 TABLET, FILM COATED ORAL at 18:11

## 2018-11-14 RX ADMIN — SODIUM CHLORIDE: 9 INJECTION, SOLUTION INTRAVENOUS at 11:56

## 2018-11-14 RX ADMIN — HYDROMORPHONE HYDROCHLORIDE 1 MG: 1 INJECTION, SOLUTION INTRAMUSCULAR; INTRAVENOUS; SUBCUTANEOUS at 01:04

## 2018-11-14 RX ADMIN — OXYCODONE HYDROCHLORIDE AND ACETAMINOPHEN 2 TABLET: 10; 325 TABLET ORAL at 07:29

## 2018-11-14 RX ADMIN — OXYCODONE HYDROCHLORIDE AND ACETAMINOPHEN 2 TABLET: 10; 325 TABLET ORAL at 19:31

## 2018-11-14 RX ADMIN — LISINOPRIL 10 MG: 5 TABLET ORAL at 06:15

## 2018-11-14 RX ADMIN — TAMSULOSIN HYDROCHLORIDE 0.4 MG: 0.4 CAPSULE ORAL at 09:14

## 2018-11-14 RX ADMIN — HYDROMORPHONE HYDROCHLORIDE 1 MG: 1 INJECTION, SOLUTION INTRAMUSCULAR; INTRAVENOUS; SUBCUTANEOUS at 06:15

## 2018-11-14 ASSESSMENT — PAIN SCALES - GENERAL
PAINLEVEL_OUTOF10: 7
PAINLEVEL_OUTOF10: 7
PAINLEVEL_OUTOF10: 3
PAINLEVEL_OUTOF10: 8
PAINLEVEL_OUTOF10: 4
PAINLEVEL_OUTOF10: 0
PAINLEVEL_OUTOF10: 8
PAINLEVEL_OUTOF10: 0

## 2018-11-14 ASSESSMENT — ENCOUNTER SYMPTOMS
WEAKNESS: 0
TINGLING: 0
ABDOMINAL PAIN: 0
DIARRHEA: 0
NAUSEA: 0
STRIDOR: 0
MYALGIAS: 0
VOMITING: 0
HEADACHES: 0
BACK PAIN: 1
LOSS OF CONSCIOUSNESS: 0
CHILLS: 0
SPUTUM PRODUCTION: 0
FALLS: 0
SHORTNESS OF BREATH: 0
DEPRESSION: 0
COUGH: 0
PALPITATIONS: 0
FEVER: 0
DIZZINESS: 0
CONSTIPATION: 0

## 2018-11-14 NOTE — PROGRESS NOTES
Assumed care of patient at 1900.  Patient is alert and oriented, sitting up in bed after eating a sandwich.  Patient will be NPO after midnight for OR tomorrow.  Patient rating pain at 5/10 in groin and denies needs at this time.  Hourly rounding continues.

## 2018-11-14 NOTE — PROGRESS NOTES
[facility] Hospitalist Progress Note    Date of Service: 2018    Chief Complaint  58 y.o. male admitted 2018 with left flank and left abdominal pain.    Interval Problem Update  Upon arrival, patient was noted to have an elevated lipase and diagnosed with pancreatitis.  Patient continues to have pain, located left lower back as well as left groin and left testicle.  Pain is now consistent but worse with urination.  Patient does have a history of kidney stones, says at one point was admitted to the urethra, states his symptoms are currently the same as that time.  Urology is following along, will likely need a cystoscopy.    Consultants/Specialty  Urology    Disposition  Patient requires additional treatment in the hospital, should be able to go home without needs at the time of discharge        Review of Systems   Constitutional: Negative for chills, fever and malaise/fatigue.   HENT: Negative for congestion.    Respiratory: Negative for cough, sputum production, shortness of breath and stridor.    Cardiovascular: Negative for chest pain, palpitations and leg swelling.   Gastrointestinal: Negative for abdominal pain, constipation, diarrhea, nausea and vomiting.   Genitourinary: Positive for dysuria. Negative for urgency.        Left testicle pain  Weak stream   Musculoskeletal: Positive for back pain (left lower). Negative for falls and myalgias.   Neurological: Negative for dizziness, tingling, loss of consciousness, weakness and headaches.   Psychiatric/Behavioral: Negative for depression and suicidal ideas.   All other systems reviewed and are negative.     Physical Exam  Laboratory/Imaging   Hemodynamics  Temp (24hrs), Av.8 °C (98.3 °F), Min:36.7 °C (98 °F), Max:37.1 °C (98.8 °F)   Temperature: 37.1 °C (98.8 °F)  Pulse  Av.5  Min: 72  Max: 98    Blood Pressure: 154/96      Respiratory      Respiration: 18, Pulse Oximetry: 97 %             Fluids    Intake/Output Summary (Last 24 hours) at  11/14/18 0715  Last data filed at 11/14/18 0500   Gross per 24 hour   Intake             3370 ml   Output             2330 ml   Net             1040 ml       Nutrition  Orders Placed This Encounter   Procedures   • Diet NPO     Standing Status:   Standing     Number of Occurrences:   8     Order Specific Question:   Restrict to:     Answer:   Sips with Medications [3]     Physical Exam   Constitutional: He is oriented to person, place, and time. He appears well-developed.  Non-toxic appearance. No distress.   HENT:   Head: Normocephalic and atraumatic. Not macrocephalic and not microcephalic. Head is without raccoon's eyes and without Castillo's sign.   Mouth/Throat: Oropharynx is clear and moist. No oropharyngeal exudate.   Eyes: Conjunctivae are normal. Right eye exhibits no discharge. Left eye exhibits no discharge. No scleral icterus.   Neck: Normal range of motion. Neck supple. No tracheal deviation, no edema and no erythema present.   Cardiovascular: Normal rate, regular rhythm, normal heart sounds and intact distal pulses.  Exam reveals no gallop and no friction rub.    No murmur heard.  Pulmonary/Chest: Effort normal and breath sounds normal. No stridor. No respiratory distress. He has no wheezes. He has no rales. He exhibits no tenderness.   Abdominal: Soft. Bowel sounds are normal. He exhibits no distension. There is no splenomegaly or hepatomegaly. There is no tenderness. There is no rebound and no guarding.   Musculoskeletal: Normal range of motion. He exhibits tenderness (left lower back, left groin and testicle ). He exhibits no edema or deformity.   Lymphadenopathy:     He has no cervical adenopathy.   Neurological: He is alert and oriented to person, place, and time. No cranial nerve deficit. Coordination normal.   Skin: Skin is warm and dry. No rash noted. He is not diaphoretic. No cyanosis or erythema. No pallor. Nails show no clubbing.   Psychiatric: He has a normal mood and affect. His speech is  normal and behavior is normal. Judgment and thought content normal. Cognition and memory are normal.   Nursing note and vitals reviewed.      Recent Labs      11/11/18   1130  11/12/18   0511  11/13/18   0459   WBC  8.1  7.6  6.8   RBC  5.66  4.97  4.84   HEMOGLOBIN  15.5  13.6*  13.3*   HEMATOCRIT  49.5  44.9  42.9   MCV  87.5  90.3  88.6   MCH  27.4  27.4  27.5   MCHC  31.3*  30.3*  31.0*   RDW  40.3  42.4  41.2   PLATELETCT  349  306  303   MPV  9.0  9.5  9.4     Recent Labs      11/11/18   1130  11/12/18   0511  11/13/18   0459   SODIUM  136  138  138   POTASSIUM  3.9  4.6  4.0   CHLORIDE  101  106  103   CO2  28  25  30   GLUCOSE  98  95  103*   BUN  15  14  6*   CREATININE  1.12  1.10  0.97   CALCIUM  8.7  8.3*  8.6             Recent Labs      11/11/18   1130   TRIGLYCERIDE  93   HDL  66   LDL  95          Assessment/Plan     * Pancreatitis   Assessment & Plan    -No epigastric pain  -I do not feel this is pancreatitis at this time, possibly upon arrival but no longer  -Pain is left lower back as well as left groin and left testicle  -Patient now tolerating regular diet but is currently n.p.o. for urologic procedure     Kidney stones   Assessment & Plan    -History, now with symptoms similar to previous stones  - repeat UA with no blood  - added toradol to narcotics  -discussed with Dr La who did see the patient overnight, patient did not want a cystoscopy unless the problem could be treated, await additional recommendations per urology     HTN (hypertension)   Assessment & Plan    -New diagnosis, possibly somewhat increased due to pain  -No improvement with amlodipine, this was stopped and lisinopril was started, improved control however he may need lisinopril increased  -Adjust meds as needed  -Placed PRN labetalol     Left flank pain   Assessment & Plan    - now more left lower back to groin and testicle  - possibly stone but none noted on CT, await scope per urology  - nor is there  hydronephrosis/hydroureter     BPH (benign prostatic hyperplasia)   Assessment & Plan    -On silodosin at home, now on Flomax  -Continue, states his stream is not quite as strong as normal, increase Flomax to 0.8 mg     Gout   Assessment & Plan    -Patient is on indomethacin and colchicine at home, both are PRN  -Currently with no symptoms of gout therefore no medications currently needed       Quality-Core Measures   Reviewed items::  Labs reviewed, Medications reviewed and Radiology images reviewed  Brian catheter::  No Brian

## 2018-11-14 NOTE — CARE PLAN
Problem: Safety  Goal: Will remain free from injury  Outcome: PROGRESSING AS EXPECTED  Bed in low and locked position.  Side rails up X2.  Call light remains in reach.  Patient uses appropriately.  Hourly rounding continues.    Problem: Pain Management  Goal: Pain level will decrease to patient's comfort goal  Outcome: PROGRESSING AS EXPECTED   11/13/18 1938 11/13/18 1740   OTHER   Pain Scale 0 - 10  5 --    Non Verbal Scale  --  Calm;Sleeping;Unlabored Breathing   Comfort Goal Comfort at Rest;Perform Activity;Comfort with Movement;Stay Alert;Sleep Comfortably --

## 2018-11-14 NOTE — DIETARY
"Nutrition services - Brief Note: Day 3 of admit.  Ra Smith is a 58 y.o. male with admitting DX of Pancreatitis.     Pt continues on NPO/CLD day #3. Per MD progress note on 11/13, pt initially felt to have pancreatitis though no longer and at this time given history of kidney stones with current L lower back pain and pain with urination to have cytoscopy with surgery urology. Per MD plan, pt to be kept NPO for urological procedure, but following, should progress to a regular diet. RD to continue to monitor.     Assessment:  Height: 185.4 cm (6' 0.99\") (obtained from prior admission data)  Weight: 115 kg (253 lb 8.5 oz)  Body mass index is 33.46 kg/m².   Diet/Intake: NPO/CLD day #3.     Labs (lipase 332, BUN 6 Total Bilirubin 2.0), MAR and Chart reviewed.     Recommendations/Plan:  1. Nutrition per MD discretion with plan to advance following urological procedure per 11/13 progress note.   2. RD to continue to monitor.           "

## 2018-11-14 NOTE — PROGRESS NOTES
Patient care assumed from night shift.  AM assessment done.  Plan of care for the day reviewed and questions answered as needed.

## 2018-11-14 NOTE — CONSULTS
UROLOGY Consult Note:    Manish La  Date & Time note created:    11/13/2018   6:44 PM     Referring MD:  Dr. Herbert    Patient ID:   Name:             Ra Smith     YOB: 1960  Age:                 58 y.o.  male   MRN:               [id number]                                                             Reason for Consult:      Feels like I have a stone stuck in my prostate like before.    History of Present Illness:    Admitted for pancreatitis and abdominal pain and left flank pain. CT shows no renal stones or hydronephrosis.    Review of Systems:      Constitutional: Denies fevers, Denies weight changes  Eyes: Denies changes in vision, no eye pain  Ears/Nose/Throat/Mouth: Denies nasal congestion or sore throat   Cardiovascular: no chest pain, no palpitations   Respiratory: no shortness of breath , Denies cough  Gastrointestinal/Hepatic: +abdominal pain, No nausea, vomiting, diarrhea, constipation or GI bleeding   Genitourinary: Denies hematuria, dysuria or frequency, see CC  Musculoskeletal/Rheum: Denies  joint pain and swelling, no edema  Skin: Denies rash  Neurological: Denies headache, confusion, memory loss or focal weakness/parasthesias  Psychiatric: denies mood disorder   Endocrine: Johanna thyroid problems  Heme/Oncology/Lymph Nodes: Denies enlarged lymph nodes, denies brusing or known bleeding disorder  All other systems were reviewed and are negative (AMA/CMS criteria)                Past Medical History:   Past Medical History:   Diagnosis Date   • Arthritis     knees- OA generalized   • Gout    • Hard of hearing    • Kidney stones    • Pain     back,legs,knees,wrist,ankle,pain scale 6   • Sleep apnea     does not use CPAP   • Snoring      Active Hospital Problems    Diagnosis   • Kidney stones [N20.0]     Priority: High   • Pancreatitis [K85.90]     Priority: High   • Left flank pain [R10.9]   • HTN (hypertension) [I10]   • Gout [M10.9]   • BPH (benign prostatic hyperplasia)  [N40.0]       Past Surgical History:  Past Surgical History:   Procedure Laterality Date   • CARPAL TUNNEL ENDOSCOPIC Right 12/30/2016    Procedure: CARPAL TUNNEL ENDOSCOPIC;  Surgeon: Dragan Cohen M.D.;  Location: SURGERY [facility];  Service:    • LUMBAR DECOMPRESSION  11/16/2009    Performed by GARY PAEZ at SURGERY [facility]   • KNEE ARTHROSCOPY  3/19/2009    Performed by TONYA EWING at [facility]   • MEDIAL MENISCECTOMY  3/19/2009    Performed by TONYA EWING at [facility]   • CARPAL TUNNEL ENDOSCOPIC Left    • KNEE ARTHROSCOPY Left     x2   • KNEE ARTHROSCOPY Right     x 3       Hospital Medications:    Current Facility-Administered Medications:   •  oxyCODONE-acetaminophen (PERCOCET-10)  MG per tablet 1-2 Tab, 1-2 Tab, Oral, Q4HRS PRN, Song Herbert D.O., 2 Tab at 11/13/18 1350  •  ketorolac (TORADOL) injection 30 mg, 30 mg, Intravenous, Q6HRS PRN, Song Herbert D.O., 30 mg at 11/13/18 1218  •  [START ON 11/14/2018] lisinopril (PRINIVIL) tablet 10 mg, 10 mg, Oral, DAILY, Song Herbert D.O.  •  labetalol (NORMODYNE,TRANDATE) injection 10 mg, 10 mg, Intravenous, Q4HRS PRN, Song Herbert D.O.  •  HYDROmorphone pf (DILAUDID) injection 0.5-1 mg, 0.5-1 mg, Intravenous, Q3HRS PRN, Peace Pat D.O., 1 mg at 11/13/18 0633  •  cyclobenzaprine (FLEXERIL) tablet 10 mg, 10 mg, Oral, TID PRN, Dheeraj Rios M.D.  •  tamsulosin (FLOMAX) capsule 0.4 mg, 0.4 mg, Oral, AFTER BREAKFAST, Dheeraj Rios M.D., 0.4 mg at 11/13/18 0859  •  senna-docusate (PERICOLACE or SENOKOT S) 8.6-50 MG per tablet 2 Tab, 2 Tab, Oral, BID, Stopped at 11/13/18 1746 **AND** polyethylene glycol/lytes (MIRALAX) PACKET 1 Packet, 1 Packet, Oral, QDAY PRN **AND** magnesium hydroxide (MILK OF MAGNESIA) suspension 30 mL, 30 mL, Oral, QDAY PRN **AND** bisacodyl (DULCOLAX) suppository 10 mg, 10 mg, Rectal, QDAY PRN, Dheeraj Rios M.D.  •  NS infusion, ,  Intravenous, Continuous, Dheeraj Rios M.D., Last Rate: 125 mL/hr at 11/13/18 1220  •  acetaminophen (TYLENOL) tablet 650 mg, 650 mg, Oral, Q6HRS PRN, Dheeraj Rios M.D.  •  Notify provider if pain remains uncontrolled, , , CONTINUOUS **AND** Use the numeric rating scale (NRS-11) on regular floors and Critical-Care Pain Observation Tool (CPOT) on ICUs/Trauma to assess pain, , , CONTINUOUS **AND** Pulse Ox (Oximetry), , , CONTINUOUS **AND** Pharmacy Consult Request ...Pain Management Review, , Other, PRN **AND** If patient difficult to arouse and/or has respiratory depression, stop any opiates that are currently infusing and call a Rapid Response., , , CONTINUOUS **AND** oxyCODONE immediate-release (ROXICODONE) tablet 2.5 mg, 2.5 mg, Oral, Q3HRS PRN **AND** oxyCODONE immediate-release (ROXICODONE) tablet 5 mg, 5 mg, Oral, Q3HRS PRN, 5 mg at 11/12/18 1429 **AND** HYDROmorphone pf (DILAUDID) injection 0.25 mg, 0.25 mg, Intravenous, Q3HRS PRN, Dheeraj Rios M.D., 0.25 mg at 11/12/18 0715    Current Outpatient Medications:  Prescriptions Prior to Admission   Medication Sig Dispense Refill Last Dose   • indomethacin (INDOCIN) 25 MG Cap Take 50 mg by mouth as needed (For Gout).   > 6 days at Unknown   • oxyCODONE-acetaminophen (PERCOCET-10)  MG Tab Take 1-2 Tabs by mouth every 8 hours as needed for Severe Pain.   11/11/2018 at 0700   • silodosin (RAPAFLO) 8 MG Cap capsule Take 8 mg by mouth ONE-HALF HOUR AFTER BREAKFAST.   11/11/2018 at 0700   • cyclobenzaprine (FLEXERIL) 10 MG Tab Take 10 mg by mouth 3 times a day as needed for Muscle Spasms.   11/10/2018 at 0500   • quiNINE 324 MG capsule Take 324 mg by mouth as needed (For cramps).   > 5 days at Unknown   • colchicine (COLCRYS) 0.6 MG Tab Take 0.6 mg by mouth 2 times a day as needed.   > 1 week at Unknown       Medication Allergy:  Allergies   Allergen Reactions   • Aspirin      Patient has a GY6PD deficiency - can't have aspirin       Family  "History:  Family History   Problem Relation Age of Onset   • Heart Disease Mother    • Diabetes Mother    • Hypertension Mother        Social History:  Social History     Social History   • Marital status:      Spouse name: N/A   • Number of children: N/A   • Years of education: N/A     Occupational History   • Not on file.     Social History Main Topics   • Smoking status: Current Every Day Smoker     Types: Cigars     Last attempt to quit: 11/1/2016   • Smokeless tobacco: Never Used      Comment: .5 ppd 15 yrs,quit 10/15/09   • Alcohol use Yes      Comment: 3 per week   • Drug use: Yes      Comment: marijuana   • Sexual activity: Not on file     Other Topics Concern   • Not on file     Social History Narrative   • No narrative on file         Physical Exam:  Vitals/ General Appearance:   Weight/BMI: Body mass index is 33.46 kg/m².  Blood pressure 156/97, pulse 85, temperature 36.7 °C (98 °F), resp. rate 18, height 1.854 m (6' 0.99\"), weight 115 kg (253 lb 8.5 oz), SpO2 100 %.  Vitals:    11/12/18 1940 11/13/18 0152 11/13/18 0746 11/13/18 1400   BP: 152/96 148/98 160/101 156/97   Pulse: 81 86 82 85   Resp: 18 18 18 18   Temp: 36.7 °C (98 °F) 36.7 °C (98.1 °F) 36.7 °C (98.1 °F) 36.7 °C (98 °F)   SpO2: 93% 100% 95% 100%   Weight:       Height:         Oxygen Therapy:  Pulse Oximetry: 100 %, O2 (LPM): 0, O2 Delivery: None (Room Air)    Constitutional:   Well developed, Well nourished, No acute distress  HENMT:  Normocephalic, Atraumatic, Oropharynx moist mucous membranes, No oral exudates, Nose normal.  No thyromegaly.  Eyes:  EOMI, Conjunctiva normal, No discharge.  Neck:  Normal range of motion, No cervical tenderness,  no JVD.  Cardiovascular:  Normal heart rate, Normal rhythm, No murmurs, No rubs, No gallops.   Extremitites with intact distal pulses, no cyanosis, or edema.  Lungs:  Normal breath sounds, breath sounds clear to auscultation bilaterally,  no crackles, no wheezing.   Abdomen: Bowel sounds " normal, Soft, No tenderness, No guarding, No rebound, No masses, No hepatosplenomegaly.  : Deferred  Skin: Warm, Dry, No erythema, No rash, no induration.  Neurologic: Alert & oriented x 3, No focal deficits noted, cranial nerves II through X are grossly intact.  Psychiatric: Affect normal, Judgment normal, Mood normal.      MDM (Data Review):     Records reviewed and summarized in current documentation    Lab Data Review:  Recent Results (from the past 24 hour(s))   CBC WITH DIFFERENTIAL    Collection Time: 11/13/18  4:59 AM   Result Value Ref Range    WBC 6.8 4.8 - 10.8 K/uL    RBC 4.84 4.70 - 6.10 M/uL    Hemoglobin 13.3 (L) 14.0 - 18.0 g/dL    Hematocrit 42.9 42.0 - 52.0 %    MCV 88.6 81.4 - 97.8 fL    MCH 27.5 27.0 - 33.0 pg    MCHC 31.0 (L) 33.7 - 35.3 g/dL    RDW 41.2 35.9 - 50.0 fL    Platelet Count 303 164 - 446 K/uL    MPV 9.4 9.0 - 12.9 fL    Neutrophils-Polys 57.50 44.00 - 72.00 %    Lymphocytes 30.50 22.00 - 41.00 %    Monocytes 9.50 0.00 - 13.40 %    Eosinophils 1.80 0.00 - 6.90 %    Basophils 0.40 0.00 - 1.80 %    Immature Granulocytes 0.30 0.00 - 0.90 %    Nucleated RBC 0.00 /100 WBC    Neutrophils (Absolute) 3.88 1.82 - 7.42 K/uL    Lymphs (Absolute) 2.06 1.00 - 4.80 K/uL    Monos (Absolute) 0.64 0.00 - 0.85 K/uL    Eos (Absolute) 0.12 0.00 - 0.51 K/uL    Baso (Absolute) 0.03 0.00 - 0.12 K/uL    Immature Granulocytes (abs) 0.02 0.00 - 0.11 K/uL    NRBC (Absolute) 0.00 K/uL   COMP METABOLIC PANEL    Collection Time: 11/13/18  4:59 AM   Result Value Ref Range    Sodium 138 135 - 145 mmol/L    Potassium 4.0 3.6 - 5.5 mmol/L    Chloride 103 96 - 112 mmol/L    Co2 30 20 - 33 mmol/L    Anion Gap 5.0 0.0 - 11.9    Glucose 103 (H) 65 - 99 mg/dL    Bun 6 (L) 8 - 22 mg/dL    Creatinine 0.97 0.50 - 1.40 mg/dL    Calcium 8.6 8.4 - 10.2 mg/dL    AST(SGOT) 42 12 - 45 U/L    ALT(SGPT) 45 2 - 50 U/L    Alkaline Phosphatase 86 30 - 99 U/L    Total Bilirubin 2.0 (H) 0.1 - 1.5 mg/dL    Albumin 3.7 3.2 - 4.9 g/dL     Total Protein 6.3 6.0 - 8.2 g/dL    Globulin 2.6 1.9 - 3.5 g/dL    A-G Ratio 1.4 g/dL   ESTIMATED GFR    Collection Time: 11/13/18  4:59 AM   Result Value Ref Range    GFR If African American >60 >60 mL/min/1.73 m 2    GFR If Non African American >60 >60 mL/min/1.73 m 2   URINALYSIS    Collection Time: 11/13/18  1:49 PM   Result Value Ref Range    Color Yellow     Character Clear     Specific Gravity 1.015 <1.035    Ph 7.0 5.0 - 8.0    Glucose Negative Negative mg/dL    Ketones Negative Negative mg/dL    Protein Negative Negative mg/dL    Bilirubin Negative Negative    Nitrite Negative Negative    Leukocyte Esterase Negative Negative    Occult Blood Negative Negative    Micro Urine Req see below        Imaging/Procedures Review:    Reviewed    MDM (Assessment and Plan):     Active Hospital Problems    Diagnosis   • Kidney stones [N20.0]     Priority: High   • Pancreatitis [K85.90]     Priority: High   • Left flank pain [R10.9]   • HTN (hypertension) [I10]   • Gout [M10.9]   • BPH (benign prostatic hyperplasia) [N40.0]       Lower  discomfort that feel slike a stone in his lower urinary tract.  Plan  Cystoscopy with conscious sedation.

## 2018-11-14 NOTE — PROGRESS NOTES
Dr. La at bedside. Pt requesting more sedation than can be provided at bedside. Per Dr. La he needs OR time tomorrow. NPO at midnight. Pt made aware.

## 2018-11-15 ENCOUNTER — PATIENT OUTREACH (OUTPATIENT)
Dept: HEALTH INFORMATION MANAGEMENT | Facility: OTHER | Age: 58
End: 2018-11-15

## 2018-11-15 VITALS
TEMPERATURE: 98.1 F | WEIGHT: 253.53 LBS | SYSTOLIC BLOOD PRESSURE: 125 MMHG | BODY MASS INDEX: 33.6 KG/M2 | HEART RATE: 87 BPM | HEIGHT: 73 IN | OXYGEN SATURATION: 93 % | DIASTOLIC BLOOD PRESSURE: 86 MMHG | RESPIRATION RATE: 18 BRPM

## 2018-11-15 PROBLEM — R10.9 LEFT FLANK PAIN: Status: RESOLVED | Noted: 2018-11-12 | Resolved: 2018-11-15

## 2018-11-15 PROBLEM — N20.0 KIDNEY STONES: Status: RESOLVED | Noted: 2018-11-13 | Resolved: 2018-11-15

## 2018-11-15 PROBLEM — K85.90 PANCREATITIS: Status: RESOLVED | Noted: 2018-11-11 | Resolved: 2018-11-15

## 2018-11-15 PROCEDURE — 99239 HOSP IP/OBS DSCHRG MGMT >30: CPT | Performed by: INTERNAL MEDICINE

## 2018-11-15 PROCEDURE — 700105 HCHG RX REV CODE 258: Performed by: HOSPITALIST

## 2018-11-15 PROCEDURE — A9270 NON-COVERED ITEM OR SERVICE: HCPCS | Performed by: INTERNAL MEDICINE

## 2018-11-15 PROCEDURE — A9270 NON-COVERED ITEM OR SERVICE: HCPCS | Performed by: HOSPITALIST

## 2018-11-15 PROCEDURE — 700102 HCHG RX REV CODE 250 W/ 637 OVERRIDE(OP): Performed by: INTERNAL MEDICINE

## 2018-11-15 PROCEDURE — 700102 HCHG RX REV CODE 250 W/ 637 OVERRIDE(OP): Performed by: HOSPITALIST

## 2018-11-15 RX ORDER — CYCLOBENZAPRINE HCL 10 MG
10 TABLET ORAL 3 TIMES DAILY PRN
Status: DISCONTINUED | OUTPATIENT
Start: 2018-11-15 | End: 2018-11-15 | Stop reason: HOSPADM

## 2018-11-15 RX ORDER — CYCLOBENZAPRINE HCL 10 MG
10 TABLET ORAL 3 TIMES DAILY PRN
Qty: 30 TAB | Refills: 0 | Status: SHIPPED | OUTPATIENT
Start: 2018-11-15 | End: 2019-03-26

## 2018-11-15 RX ORDER — LISINOPRIL 10 MG/1
10 TABLET ORAL DAILY
Qty: 30 TAB | Refills: 0 | Status: SHIPPED | OUTPATIENT
Start: 2018-11-16 | End: 2019-03-26

## 2018-11-15 RX ADMIN — LISINOPRIL 10 MG: 5 TABLET ORAL at 05:11

## 2018-11-15 RX ADMIN — OXYCODONE HYDROCHLORIDE AND ACETAMINOPHEN 2 TABLET: 10; 325 TABLET ORAL at 13:48

## 2018-11-15 RX ADMIN — STANDARDIZED SENNA CONCENTRATE AND DOCUSATE SODIUM 2 TABLET: 8.6; 5 TABLET, FILM COATED ORAL at 05:10

## 2018-11-15 RX ADMIN — SODIUM CHLORIDE: 9 INJECTION, SOLUTION INTRAVENOUS at 05:18

## 2018-11-15 RX ADMIN — OXYCODONE HYDROCHLORIDE AND ACETAMINOPHEN 2 TABLET: 10; 325 TABLET ORAL at 05:10

## 2018-11-15 RX ADMIN — TAMSULOSIN HYDROCHLORIDE 0.8 MG: 0.4 CAPSULE ORAL at 08:23

## 2018-11-15 RX ADMIN — OXYCODONE HYDROCHLORIDE AND ACETAMINOPHEN 2 TABLET: 10; 325 TABLET ORAL at 09:20

## 2018-11-15 RX ADMIN — CYCLOBENZAPRINE HYDROCHLORIDE 10 MG: 10 TABLET, FILM COATED ORAL at 10:56

## 2018-11-15 ASSESSMENT — PAIN SCALES - GENERAL
PAINLEVEL_OUTOF10: 7
PAINLEVEL_OUTOF10: 5

## 2018-11-15 NOTE — DISCHARGE PLANNING
Care Transition Team Assessment      Information Source Chart  Information Given By: Patient  Who is responsible for making decisions for patient? : Patient    Readmission Evaluation  Is this a readmission?: No    Elopement Risk  Legal Hold: No  Ambulatory or Self Mobile in Wheelchair: Yes  Disoriented: No  Psychiatric Symptoms: None  History of Wandering: No  Elopement this Admit: No  Vocalizing Wanting to Leave: No  Displays Behaviors, Body Language Wanting to Leave: No-Not at Risk for Elopement  Elopement Risk: Not at Risk for Elopement    Interdisciplinary Discharge Planning  Does Admitting Nurse Feel This Could be a Complex Discharge?: No  Lives with - Patient's Self Care Capacity: Spouse  Patient or legal guardian wants to designate a caregiver (see row info): No  Support Systems: Spouse / Significant Other  Able to Return to Previous ADL's: Yes  Mobility Issues: No  Assistance Needed: No  Durable Medical Equipment: Not Applicable    Discharge Preparedness  What is your plan after discharge?: Home with help  What are your discharge supports?: Spouse  Prior Functional Level: Ambulatory    Functional Assesment  Prior Functional Level: Ambulatory    Finances  Financial Barriers to Discharge: No  Prescription Coverage: Yes    Vision / Hearing Impairment  Vision Impairment : No  Hearing Impairment : Yes  Hearing Impairment: Right Ear, Hearing Device(s) Available  Does Pt Need Special Equipment for the Hearing Impaired?: No    Values / Beliefs / Concerns  Values / Beliefs Concerns : No    Domestic Abuse  Have you ever been the victim of abuse or violence?: No    Discharge Risks or Barriers  Discharge risks or barriers?: No    Anticipated Discharge Information  Anticipated discharge disposition: Home

## 2018-11-15 NOTE — DISCHARGE SUMMARY
Discharge Summary    CHIEF COMPLAINT ON ADMISSION  Chief Complaint   Patient presents with   • Flank Pain       Reason for Admission  Flank Pain     Admission Date  11/11/2018    CODE STATUS  Full Code    HPI & HOSPITAL COURSE  This is a 58 y.o. male here with left flank, left abdomen and left lower back pain.  Patient had a history of kidney stone that he states traversed to the urethra causing the same pain he is having now.  At that time he saw urology and they did a procedure.  Patient's pain did not improve so urology was consulted, they did a transurethral resection of the prostate.  Patient now has a Brian in place and will go home with a Brian, follow with urology for removal as an outpatient.  Patient continues to have pain however it is somewhat improved.  Patient does have chronic pain however this is different.  He also says has tight muscles.  Patient also had mentioned moving a heavy object prior to coming in prior to the pain starting.  At this point in time I think this is likely musculoskeletal, he does have pain meds at home, will also send him home with Flexeril.  Patient has had a problem with his lower back in the past however the pain does not start there, there was no trauma so I am no reason to suspect hip fracture in a patient does have groin pain.  I did go through the multiple options as well as additional imaging that would be a possibility however patient agrees with the plan.  Patient does express a desire to go home.  Patient would likely benefit from outpatient physical therapy, this is been ordered.  Unsure since he lives in [place] if this order will go through there, patient is unlikely to require a prescription for physical therapy.       Therefore, he is discharged in good and stable condition to home with close outpatient follow-up.    The patient met 2-midnight criteria for an inpatient stay at the time of discharge.    Discharge Date  11/15/18    FOLLOW UP ITEMS POST  DISCHARGE  Follow-up with primary care provider within 1 week  Follow-up with urology in 2-3 days  Emergency department or 911 in case of emergency    DISCHARGE DIAGNOSES  Principal Problem (Resolved):    Pancreatitis POA: Unknown  Active Problems:    Gout POA: Unknown    BPH (benign prostatic hyperplasia) POA: Unknown    HTN (hypertension) POA: Unknown  Resolved Problems:    Kidney stones POA: Unknown    Left flank pain POA: Unknown      FOLLOW UP  No future appointments.  Nicanor Mora M.D.  [address] [facility] [address]  [phone]    On 11/20/2018  Please check in at 1245 pm for your appointment thank you     Wan Zelaya M.D.  [address] [facility] [address]  [phone]    On 11/27/2018  Please check in at 1045am for your appointment, thank you       MEDICATIONS ON DISCHARGE     Medication List      START taking these medications      Instructions   lisinopril 10 MG Tabs  Start taking on:  11/16/2018  Commonly known as:  PRINIVIL   Take 1 Tab by mouth every day.  Dose:  10 mg        CONTINUE taking these medications      Instructions   colchicine 0.6 MG Tabs  Commonly known as:  COLCRYS   Take 0.6 mg by mouth 2 times a day as needed.  Dose:  0.6 mg     cyclobenzaprine 10 MG Tabs  Commonly known as:  FLEXERIL   Take 1 Tab by mouth 3 times a day as needed for Muscle Spasms.  Dose:  10 mg     indomethacin 25 MG Caps  Commonly known as:  INDOCIN   Take 50 mg by mouth as needed (For Gout).  Dose:  50 mg     oxyCODONE-acetaminophen  MG Tabs  Commonly known as:  PERCOCET-10   Take 1-2 Tabs by mouth every 8 hours as needed for Severe Pain.  Dose:  1-2 Tab     quiNINE 324 MG capsule   Take 324 mg by mouth as needed (For cramps).  Dose:  324 mg     RAPAFLO 8 MG Caps capsule  Generic drug:  silodosin   Take 8 mg by mouth ONE-HALF HOUR AFTER BREAKFAST.  Dose:  8 mg            Allergies  Allergies   Allergen Reactions   • Aspirin      Patient has a GY6PD deficiency - can't have aspirin       DIET  Orders  Placed This Encounter   Procedures   • Diet Order Regular     Standing Status:   Standing     Number of Occurrences:   1     Order Specific Question:   Diet:     Answer:   Regular [1]     Order Specific Question:   Consistency/Fluid modifications:     Answer:   Thin Liquids [3]       ACTIVITY  As tolerated.  Weight bearing as tolerated    CONSULTATIONS  Urology    PROCEDURES  Transurethral resection of the prostate on 11/14    LABORATORY  Lab Results   Component Value Date    SODIUM 138 11/13/2018    POTASSIUM 4.0 11/13/2018    CHLORIDE 103 11/13/2018    CO2 30 11/13/2018    GLUCOSE 103 (H) 11/13/2018    BUN 6 (L) 11/13/2018    CREATININE 0.97 11/13/2018        Lab Results   Component Value Date    WBC 6.8 11/13/2018    HEMOGLOBIN 13.3 (L) 11/13/2018    HEMATOCRIT 42.9 11/13/2018    PLATELETCT 303 11/13/2018        Total time of the discharge process exceeds 42 minutes.

## 2018-11-15 NOTE — PROGRESS NOTES
"Urology Progress Note    Post op Day # 1 cystoscopy, bulbar stricture incised as well as tight bladder neck.     Interval Events: None    S: No fevers, chills, nausea or vomiting.  + flatus. intermittent left sided groin pain, well controlled with pain medication    O:   Blood pressure 140/92, pulse 79, temperature 36.6 °C (97.9 °F), temperature source Oral, resp. rate 18, height 1.854 m (6' 0.99\"), weight 115 kg (253 lb 8.5 oz), SpO2 98 %.  Recent Labs      11/13/18   0459   SODIUM  138   POTASSIUM  4.0   CHLORIDE  103   CO2  30   GLUCOSE  103*   BUN  6*   CREATININE  0.97   CALCIUM  8.6     Recent Labs      11/13/18   0459   WBC  6.8   RBC  4.84   HEMOGLOBIN  13.3*   HEMATOCRIT  42.9   MCV  88.6   MCH  27.5   MCHC  31.0*   RDW  41.2   PLATELETCT  303   MPV  9.4         Intake/Output Summary (Last 24 hours) at 11/15/18 1003  Last data filed at 11/15/18 0900   Gross per 24 hour   Intake             3850 ml   Output             2050 ml   Net             1800 ml       Exam:  Abdomen soft, benign.  Incisions clean, dry, intact.   Urine: clear to down drain.     A/P:    Active Hospital Problems    Diagnosis   • Kidney stones [N20.0]     Priority: High   • Pancreatitis [K85.90]     Priority: High   • Left flank pain [R10.9]   • HTN (hypertension) [I10]   • Gout [M10.9]   • BPH (benign prostatic hyperplasia) [N40.0]       Stable.   Ambulate, IS.  Torres catheter to remain in place for 2-3 days.   [facility] will contact pt and schedule pt for torres removal- most likely on Monday 11/19/18     Plan of care directed and discussed with MD Zelaya.     Emelia Booth, [email or web address].  "

## 2018-11-15 NOTE — CARE PLAN
Problem: Nutritional:  Goal: Achieve adequate nutritional intake  Diet advancement +  PO intake >/= 50% of meals consistently   Outcome: PROGRESSING AS EXPECTED

## 2018-11-15 NOTE — DISCHARGE INSTRUCTIONS
Discharge Instructions    Discharged to home by car with relative. Discharged via wheelchair, hospital escort: Yes.  Special equipment needed: Not Applicable    Be sure to schedule a follow-up appointment with your primary care doctor or any specialists as instructed.     Discharge Plan:   Diet Plan: Discussed  Activity Level: Discussed  Smoking Cessation Offered: Patient Refused  Confirmed Follow up Appointment: Appointment Scheduled  Confirmed Symptoms Management: Discussed  Medication Reconciliation Updated: Yes  Influenza Vaccine Indication: Patient Refuses    I understand that a diet low in cholesterol, fat, and sodium is recommended for good health. Unless I have been given specific instructions below for another diet, I accept this instruction as my diet prescription.   Other diet: regular as tolerated    Special Instructions: None    · Is patient discharged on Warfarin / Coumadin?   No     Depression / Suicide Risk    As you are discharged from this [facility] facility, it is important to learn how to keep safe from harming yourself.    Recognize the warning signs:  · Abrupt changes in personality, positive or negative- including increase in energy   · Giving away possessions  · Change in eating patterns- significant weight changes-  positive or negative  · Change in sleeping patterns- unable to sleep or sleeping all the time   · Unwillingness or inability to communicate  · Depression  · Unusual sadness, discouragement and loneliness  · Talk of wanting to die  · Neglect of personal appearance   · Rebelliousness- reckless behavior  · Withdrawal from people/activities they love  · Confusion- inability to concentrate     If you or a loved one observes any of these behaviors or has concerns about self-harm, here's what you can do:  · Talk about it- your feelings and reasons for harming yourself  · Remove any means that you might use to hurt yourself (examples: pills, rope, extension cords, firearm)  · Get  professional help from the community (Mental Health, Substance Abuse, psychological counseling)  · Do not be alone:Call your Safe Contact- someone whom you trust who will be there for you.  · Call your local CRISIS HOTLINE 404-5718 or 032-598-4894  · Call your local Children's Mobile Crisis Response Team [place] (695) 701-0280 or www.[employer]  · Call the toll free National Suicide Prevention Hotlines   · National Suicide Prevention Lifeline 541-031-YMQK (0666)  · National Hope Line Network 800-SUICIDE (903-7074)

## 2018-11-15 NOTE — CARE PLAN
Problem: Discharge Barriers/Planning  Goal: Patient's continuum of care needs will be met    Intervention: Assess potential discharge barriers on admission and throughout hospital stay  All discharge criteria met.

## 2018-11-15 NOTE — OP REPORT
DATE OF SERVICE:  11/14/2018    PREOPERATIVE DIAGNOSES:  History of urethral stricture and possible urethral   or prostatic calcifications.    POSTOPERATIVE DIAGNOSES:  Urethral stricture and bladder neck contracture.    PROCEDURE PERFORMED:  Cystoscopy, direct vision internal urethrotomy and   incision of bladder neck contracture.    SURGEON:  Wan Zelaya MD    ANESTHESIOLOGIST:  Zander Tian MD    ANESTHESIA:  General.    INDICATIONS FOR PROCEDURE:  The patient is a 58-year-old gentleman with a   history of urethral stricture and history of prostatic calcification, he has   had prior internal urethrotomy and incision of the ejaculatory duct.  Patient   presented to the hospital with difficulty urinating and lower abdominal pain.    Imaging showed prostatic calcifications.  After discussing treatment options,   he has elected for cystoscopy and possible internal urethrotomy and possible   incision of bladder neck or prostate.    DESCRIPTION OF PROCEDURE:  After obtaining informed consent, the patient was   brought to the operating room.  After the induction of general anesthesia, he   was placed in dorsal lithotomy position and genitalia were prepped and draped   in sterile fashion.  He received Cipro as antibiotic prophylaxis prior to   starting the procedure.  A 26-Bengali resectoscope was passed up the urethra   until a stricture was encountered in the bulbar urethra, it is approximately   12-Bengali in size.  I was able to get the urethrotome blade into the stricture   and cut the stricture at the 12 o'clock position enough for the scope to pass   all the way into the bladder.  The bladder neck was slightly tight just   barely accommodating the 26-Bengali scope.  I did perform an incision of the   bladder neck at the 5 and 7 o'clock positions with a hot knife to open up the   bladder neck.  The prostatic urethra was closely examined.  There were no   calcifications within the prostatic urethral lumen nor in  the bladder.  There   was evidence of prior incision of the ejaculatory duct was wide open with no   stone seen within that.  No stones that appeared to be anywhere close to the   urethral lumen.  The resectoscope was then slowly removed and both areas had   been incised for wide open and there was good hemostasis, so the scope was   removed and a 22-North Korean 2-way Brian catheter was placed, 10 mL of sterile   water placed in the balloon and irrigated clear and hooked up to down drain.    Patient was then awoken from anesthesia and taken to recovery room in stable   condition.    COMPLICATIONS:  None.    ESTIMATED BLOOD LOSS:  5 mL.    SPECIMENS:  None.    DRAINS:  A 22-[place] Korean 2-way Brian catheter.       ____________________________________     MD GWENDOLYN To / JILLIAN    DD:  11/14/2018 16:30:41  DT:  11/14/2018 16:45:44    D#:  [id number]  Job#:  [id number]

## 2018-11-15 NOTE — PROGRESS NOTES
Report received, care of pt assumed. Pt resting comfortably in bed, wife at bedside. Brian in place draining to gravity, urine mark with slight pink tinge, clear. Pt states he is continuing to have the same pain to the flank/pelvis/penis area but it is improved. Medicated per MAR and heat packs provided. Plan of care discussed and pt provided with snacks. Safety precautions in place.

## 2018-11-15 NOTE — OR NURSING
1626: To PACU post cystoscopy with urethral stricture incision. Brian to gravity. LMA dc'd on arrival. Breathing is spontaneous and unlabored. Pt denies pain or nausea.  1645: Pt remains pain/nausea free.  1715: No change. Meets criteria for transfer to room.

## 2018-11-15 NOTE — PROGRESS NOTES
Received report from Ruth Ann CORDOBA. Assumed care. This pt is AOx4, reports pain, will medicate per MAR. Patient and RN discussed plan of care including torres catheter, monitor drainage, pain management, IV fluids: questions answered. Chart reviewed. Call light in place, fall precautions in place, patient educated on importance of calling for assistance. No additional needs at this time.

## 2018-11-15 NOTE — OR SURGEON
Immediate Post OP Note    PreOp Diagnosis: urethral stricture    PostOp Diagnosis: same    Procedure(s):  CYSTOSCOPY  Direct Vision Internal Urethrotomy - FOR URETHRAL STRICTURE  TRANS URETHRAL RESECTION PROSTATE    Surgeon(s):  Wan Zelaya M.D.    Anesthesiologist/Type of Anesthesia:  No anesthesia staff entered./* No anesthesia type entered *    Surgical Staff:  Circulator: Chinedu Nicholson R.N.  Scrub Person: Supa August    Specimens removed if any:  none    Estimated Blood Loss: 5ml    Findings: bulbar stricture ~12Fr; tight bladder neck    Complications: none    Drain:  22Fr torres    11/14/2018 4:25 PM Wan Zelaya M.D.

## 2018-11-15 NOTE — PROGRESS NOTES
Discharging patient home per MD order.  Pt demonstrated understanding of discharge instructions, follow up appointments, home medications, prescriptions. Ambulating without assistance, voiding without difficulty, pain well controlled, tolerating oral medications, tolerating diet. Pt verbalized understanding of discharge instructions and educational handouts, all questions answered.  Pt discharged off unit with hospital escort at 1410. Pt given leg bag and a leg bag for at night. Will call to follow up with urology in 2-3 days.

## 2019-03-26 ENCOUNTER — HOSPITAL ENCOUNTER (OUTPATIENT)
Dept: LAB | Facility: MEDICAL CENTER | Age: 59
End: 2019-03-26
Attending: UROLOGY
Payer: COMMERCIAL

## 2019-03-26 DIAGNOSIS — Z01.812 PRE-OPERATIVE LABORATORY EXAMINATION: ICD-10-CM

## 2019-03-26 DIAGNOSIS — Z01.810 PRE-OPERATIVE CARDIOVASCULAR EXAMINATION: ICD-10-CM

## 2019-03-26 LAB
ALBUMIN SERPL BCP-MCNC: 4.2 G/DL (ref 3.2–4.9)
ALBUMIN/GLOB SERPL: 1.5 G/DL
ALP SERPL-CCNC: 81 U/L (ref 30–99)
ALT SERPL-CCNC: 33 U/L (ref 2–50)
ANION GAP SERPL CALC-SCNC: 4 MMOL/L (ref 0–11.9)
APPEARANCE UR: ABNORMAL
AST SERPL-CCNC: 29 U/L (ref 12–45)
BACTERIA #/AREA URNS HPF: ABNORMAL /HPF
BASOPHILS # BLD AUTO: 0.4 % (ref 0–1.8)
BASOPHILS # BLD: 0.03 K/UL (ref 0–0.12)
BILIRUB SERPL-MCNC: 0.8 MG/DL (ref 0.1–1.5)
BILIRUB UR QL STRIP.AUTO: NEGATIVE
BUN SERPL-MCNC: 11 MG/DL (ref 8–22)
CALCIUM SERPL-MCNC: 9.1 MG/DL (ref 8.5–10.5)
CHLORIDE SERPL-SCNC: 107 MMOL/L (ref 96–112)
CO2 SERPL-SCNC: 29 MMOL/L (ref 20–33)
COLOR UR: YELLOW
CREAT SERPL-MCNC: 1.05 MG/DL (ref 0.5–1.4)
EKG IMPRESSION: NORMAL
EOSINOPHIL # BLD AUTO: 0.06 K/UL (ref 0–0.51)
EOSINOPHIL NFR BLD: 0.8 % (ref 0–6.9)
EPI CELLS #/AREA URNS HPF: NEGATIVE /HPF
ERYTHROCYTE [DISTWIDTH] IN BLOOD BY AUTOMATED COUNT: 41.1 FL (ref 35.9–50)
GLOBULIN SER CALC-MCNC: 2.8 G/DL (ref 1.9–3.5)
GLUCOSE SERPL-MCNC: 93 MG/DL (ref 65–99)
GLUCOSE UR STRIP.AUTO-MCNC: NEGATIVE MG/DL
HCT VFR BLD AUTO: 44.6 % (ref 42–52)
HGB BLD-MCNC: 13.8 G/DL (ref 14–18)
HYALINE CASTS #/AREA URNS LPF: ABNORMAL /LPF
IMM GRANULOCYTES # BLD AUTO: 0.1 K/UL (ref 0–0.11)
IMM GRANULOCYTES NFR BLD AUTO: 1.4 % (ref 0–0.9)
KETONES UR STRIP.AUTO-MCNC: NEGATIVE MG/DL
LEUKOCYTE ESTERASE UR QL STRIP.AUTO: ABNORMAL
LYMPHOCYTES # BLD AUTO: 2.84 K/UL (ref 1–4.8)
LYMPHOCYTES NFR BLD: 39.6 % (ref 22–41)
MCH RBC QN AUTO: 27.6 PG (ref 27–33)
MCHC RBC AUTO-ENTMCNC: 30.9 G/DL (ref 33.7–35.3)
MCV RBC AUTO: 89.2 FL (ref 81.4–97.8)
MICRO URNS: ABNORMAL
MONOCYTES # BLD AUTO: 0.56 K/UL (ref 0–0.85)
MONOCYTES NFR BLD AUTO: 7.8 % (ref 0–13.4)
NEUTROPHILS # BLD AUTO: 3.59 K/UL (ref 1.82–7.42)
NEUTROPHILS NFR BLD: 50 % (ref 44–72)
NITRITE UR QL STRIP.AUTO: NEGATIVE
NRBC # BLD AUTO: 0 K/UL
NRBC BLD-RTO: 0 /100 WBC
PH UR STRIP.AUTO: 7 [PH]
PLATELET # BLD AUTO: 322 K/UL (ref 164–446)
PMV BLD AUTO: 9.8 FL (ref 9–12.9)
POTASSIUM SERPL-SCNC: 4.1 MMOL/L (ref 3.6–5.5)
PROT SERPL-MCNC: 7 G/DL (ref 6–8.2)
PROT UR QL STRIP: 30 MG/DL
RBC # BLD AUTO: 5 M/UL (ref 4.7–6.1)
RBC # URNS HPF: ABNORMAL /HPF
RBC UR QL AUTO: ABNORMAL
SODIUM SERPL-SCNC: 140 MMOL/L (ref 135–145)
SP GR UR STRIP.AUTO: 1.02
UROBILINOGEN UR STRIP.AUTO-MCNC: 0.2 MG/DL
WBC # BLD AUTO: 7.2 K/UL (ref 4.8–10.8)
WBC #/AREA URNS HPF: ABNORMAL /HPF

## 2019-03-26 PROCEDURE — 80053 COMPREHEN METABOLIC PANEL: CPT

## 2019-03-26 PROCEDURE — 87077 CULTURE AEROBIC IDENTIFY: CPT

## 2019-03-26 PROCEDURE — 93005 ELECTROCARDIOGRAM TRACING: CPT

## 2019-03-26 PROCEDURE — 87077 CULTURE AEROBIC IDENTIFY: CPT | Mod: 91

## 2019-03-26 PROCEDURE — 93010 ELECTROCARDIOGRAM REPORT: CPT | Performed by: INTERNAL MEDICINE

## 2019-03-26 PROCEDURE — 85025 COMPLETE CBC W/AUTO DIFF WBC: CPT

## 2019-03-26 PROCEDURE — 87186 SC STD MICRODIL/AGAR DIL: CPT

## 2019-03-26 PROCEDURE — 36415 COLL VENOUS BLD VENIPUNCTURE: CPT

## 2019-03-26 PROCEDURE — 87086 URINE CULTURE/COLONY COUNT: CPT

## 2019-03-26 PROCEDURE — 87186 SC STD MICRODIL/AGAR DIL: CPT | Mod: 91

## 2019-03-26 PROCEDURE — 81001 URINALYSIS AUTO W/SCOPE: CPT

## 2019-03-27 LAB
AMBIGUOUS DTTM AMBI4: NORMAL
SIGNIFICANT IND 70042: NORMAL
SITE SITE: NORMAL
SOURCE SOURCE: NORMAL

## 2019-03-28 LAB
BACTERIA UR CULT: ABNORMAL
BACTERIA UR CULT: ABNORMAL
SIGNIFICANT IND 70042: ABNORMAL
SITE SITE: ABNORMAL
SOURCE SOURCE: ABNORMAL

## 2019-04-05 ENCOUNTER — HOSPITAL ENCOUNTER (OUTPATIENT)
Facility: MEDICAL CENTER | Age: 59
End: 2019-04-05
Attending: UROLOGY | Admitting: UROLOGY
Payer: COMMERCIAL

## 2019-04-05 ENCOUNTER — ANESTHESIA (OUTPATIENT)
Dept: SURGERY | Facility: MEDICAL CENTER | Age: 59
End: 2019-04-05
Payer: COMMERCIAL

## 2019-04-05 ENCOUNTER — ANESTHESIA EVENT (OUTPATIENT)
Dept: SURGERY | Facility: MEDICAL CENTER | Age: 59
End: 2019-04-05
Payer: COMMERCIAL

## 2019-04-05 ENCOUNTER — APPOINTMENT (OUTPATIENT)
Dept: RADIOLOGY | Facility: MEDICAL CENTER | Age: 59
End: 2019-04-05
Attending: UROLOGY
Payer: COMMERCIAL

## 2019-04-05 VITALS
SYSTOLIC BLOOD PRESSURE: 111 MMHG | RESPIRATION RATE: 16 BRPM | HEIGHT: 73 IN | WEIGHT: 257.72 LBS | HEART RATE: 78 BPM | OXYGEN SATURATION: 96 % | DIASTOLIC BLOOD PRESSURE: 80 MMHG | BODY MASS INDEX: 34.16 KG/M2 | TEMPERATURE: 98.4 F

## 2019-04-05 PROCEDURE — C1769 GUIDE WIRE: HCPCS | Performed by: UROLOGY

## 2019-04-05 PROCEDURE — 160025 RECOVERY II MINUTES (STATS): Performed by: UROLOGY

## 2019-04-05 PROCEDURE — 700111 HCHG RX REV CODE 636 W/ 250 OVERRIDE (IP): Performed by: UROLOGY

## 2019-04-05 PROCEDURE — 160028 HCHG SURGERY MINUTES - 1ST 30 MINS LEVEL 3: Performed by: UROLOGY

## 2019-04-05 PROCEDURE — 160035 HCHG PACU - 1ST 60 MINS PHASE I: Performed by: UROLOGY

## 2019-04-05 PROCEDURE — A9270 NON-COVERED ITEM OR SERVICE: HCPCS | Performed by: ANESTHESIOLOGY

## 2019-04-05 PROCEDURE — 700111 HCHG RX REV CODE 636 W/ 250 OVERRIDE (IP): Performed by: ANESTHESIOLOGY

## 2019-04-05 PROCEDURE — 160048 HCHG OR STATISTICAL LEVEL 1-5: Performed by: UROLOGY

## 2019-04-05 PROCEDURE — 700111 HCHG RX REV CODE 636 W/ 250 OVERRIDE (IP)

## 2019-04-05 PROCEDURE — 501329 HCHG SET, CYSTO IRRIG Y TUR: Performed by: UROLOGY

## 2019-04-05 PROCEDURE — 160046 HCHG PACU - 1ST 60 MINS PHASE II: Performed by: UROLOGY

## 2019-04-05 PROCEDURE — 160039 HCHG SURGERY MINUTES - EA ADDL 1 MIN LEVEL 3: Performed by: UROLOGY

## 2019-04-05 PROCEDURE — 160002 HCHG RECOVERY MINUTES (STAT): Performed by: UROLOGY

## 2019-04-05 PROCEDURE — 160009 HCHG ANES TIME/MIN: Performed by: UROLOGY

## 2019-04-05 PROCEDURE — A6402 STERILE GAUZE <= 16 SQ IN: HCPCS | Performed by: UROLOGY

## 2019-04-05 PROCEDURE — 500879 HCHG PACK, CYSTO: Performed by: UROLOGY

## 2019-04-05 PROCEDURE — 700101 HCHG RX REV CODE 250: Performed by: ANESTHESIOLOGY

## 2019-04-05 PROCEDURE — A4338 INDWELLING CATHETER LATEX: HCPCS | Performed by: UROLOGY

## 2019-04-05 PROCEDURE — 500864 HCHG NEEDLE, SPINAL 18G: Performed by: UROLOGY

## 2019-04-05 PROCEDURE — 700101 HCHG RX REV CODE 250

## 2019-04-05 PROCEDURE — 160036 HCHG PACU - EA ADDL 30 MINS PHASE I: Performed by: UROLOGY

## 2019-04-05 PROCEDURE — 700102 HCHG RX REV CODE 250 W/ 637 OVERRIDE(OP): Performed by: ANESTHESIOLOGY

## 2019-04-05 RX ORDER — GENTAMICIN SULFATE 40 MG/ML
INJECTION, SOLUTION INTRAMUSCULAR; INTRAVENOUS PRN
Status: DISCONTINUED | OUTPATIENT
Start: 2019-04-05 | End: 2019-04-05 | Stop reason: SURG

## 2019-04-05 RX ORDER — HALOPERIDOL 5 MG/ML
1 INJECTION INTRAMUSCULAR
Status: DISCONTINUED | OUTPATIENT
Start: 2019-04-05 | End: 2019-04-05 | Stop reason: HOSPADM

## 2019-04-05 RX ORDER — PHENYLEPHRINE HYDROCHLORIDE 10 MG/ML
INJECTION, SOLUTION INTRAMUSCULAR; INTRAVENOUS; SUBCUTANEOUS PRN
Status: DISCONTINUED | OUTPATIENT
Start: 2019-04-05 | End: 2019-04-05 | Stop reason: SURG

## 2019-04-05 RX ORDER — DEXMEDETOMIDINE HYDROCHLORIDE 100 UG/ML
INJECTION, SOLUTION INTRAVENOUS PRN
Status: DISCONTINUED | OUTPATIENT
Start: 2019-04-05 | End: 2019-04-05 | Stop reason: SURG

## 2019-04-05 RX ORDER — OXYCODONE HCL 5 MG/5 ML
5 SOLUTION, ORAL ORAL
Status: COMPLETED | OUTPATIENT
Start: 2019-04-05 | End: 2019-04-05

## 2019-04-05 RX ORDER — BUPIVACAINE HYDROCHLORIDE AND EPINEPHRINE 5; 5 MG/ML; UG/ML
INJECTION, SOLUTION EPIDURAL; INTRACAUDAL; PERINEURAL
Status: DISCONTINUED | OUTPATIENT
Start: 2019-04-05 | End: 2019-04-05 | Stop reason: HOSPADM

## 2019-04-05 RX ORDER — CEFAZOLIN SODIUM 1 G/3ML
INJECTION, POWDER, FOR SOLUTION INTRAMUSCULAR; INTRAVENOUS PRN
Status: DISCONTINUED | OUTPATIENT
Start: 2019-04-05 | End: 2019-04-05 | Stop reason: SURG

## 2019-04-05 RX ORDER — DIPHENHYDRAMINE HYDROCHLORIDE 50 MG/ML
12.5 INJECTION INTRAMUSCULAR; INTRAVENOUS
Status: DISCONTINUED | OUTPATIENT
Start: 2019-04-05 | End: 2019-04-05 | Stop reason: HOSPADM

## 2019-04-05 RX ORDER — ONDANSETRON 2 MG/ML
4 INJECTION INTRAMUSCULAR; INTRAVENOUS
Status: COMPLETED | OUTPATIENT
Start: 2019-04-05 | End: 2019-04-05

## 2019-04-05 RX ORDER — SODIUM CHLORIDE, SODIUM LACTATE, POTASSIUM CHLORIDE, CALCIUM CHLORIDE 600; 310; 30; 20 MG/100ML; MG/100ML; MG/100ML; MG/100ML
INJECTION, SOLUTION INTRAVENOUS CONTINUOUS
Status: DISCONTINUED | OUTPATIENT
Start: 2019-04-05 | End: 2019-04-05 | Stop reason: HOSPADM

## 2019-04-05 RX ORDER — DEXAMETHASONE SODIUM PHOSPHATE 4 MG/ML
INJECTION, SOLUTION INTRA-ARTICULAR; INTRALESIONAL; INTRAMUSCULAR; INTRAVENOUS; SOFT TISSUE PRN
Status: DISCONTINUED | OUTPATIENT
Start: 2019-04-05 | End: 2019-04-05 | Stop reason: SURG

## 2019-04-05 RX ORDER — LEVOFLOXACIN 500 MG/1
500 TABLET, FILM COATED ORAL DAILY
COMMUNITY
End: 2019-04-26

## 2019-04-05 RX ORDER — OXYCODONE HCL 5 MG/5 ML
10 SOLUTION, ORAL ORAL
Status: COMPLETED | OUTPATIENT
Start: 2019-04-05 | End: 2019-04-05

## 2019-04-05 RX ORDER — ONDANSETRON 2 MG/ML
INJECTION INTRAMUSCULAR; INTRAVENOUS PRN
Status: DISCONTINUED | OUTPATIENT
Start: 2019-04-05 | End: 2019-04-05 | Stop reason: SURG

## 2019-04-05 RX ADMIN — LIDOCAINE HYDROCHLORIDE 0.5 ML: 10 INJECTION, SOLUTION EPIDURAL; INFILTRATION; INTRACAUDAL; PERINEURAL at 12:54

## 2019-04-05 RX ADMIN — SODIUM CHLORIDE, SODIUM LACTATE, POTASSIUM CHLORIDE, CALCIUM CHLORIDE: 600; 310; 30; 20 INJECTION, SOLUTION INTRAVENOUS at 14:28

## 2019-04-05 RX ADMIN — DEXAMETHASONE SODIUM PHOSPHATE 4 MG: 4 INJECTION, SOLUTION INTRAMUSCULAR; INTRAVENOUS at 13:45

## 2019-04-05 RX ADMIN — PHENYLEPHRINE HYDROCHLORIDE 100 MCG: 10 INJECTION INTRAVENOUS at 14:20

## 2019-04-05 RX ADMIN — SUGAMMADEX 200 MG: 100 INJECTION, SOLUTION INTRAVENOUS at 14:50

## 2019-04-05 RX ADMIN — PHENYLEPHRINE HYDROCHLORIDE 100 MCG: 10 INJECTION INTRAVENOUS at 14:10

## 2019-04-05 RX ADMIN — SODIUM CHLORIDE, SODIUM LACTATE, POTASSIUM CHLORIDE, CALCIUM CHLORIDE: 600; 310; 30; 20 INJECTION, SOLUTION INTRAVENOUS at 12:54

## 2019-04-05 RX ADMIN — DEXMEDETOMIDINE HYDROCHLORIDE 100 MCG: 100 INJECTION, SOLUTION INTRAVENOUS at 13:42

## 2019-04-05 RX ADMIN — GENTAMICIN SULFATE 80 MG: 40 INJECTION, SOLUTION INTRAMUSCULAR; INTRAVENOUS at 13:38

## 2019-04-05 RX ADMIN — EPHEDRINE SULFATE 10 MG: 50 INJECTION INTRAMUSCULAR; INTRAVENOUS; SUBCUTANEOUS at 14:20

## 2019-04-05 RX ADMIN — PHENYLEPHRINE HYDROCHLORIDE 100 MCG: 10 INJECTION INTRAVENOUS at 14:15

## 2019-04-05 RX ADMIN — CEFAZOLIN 2 G: 330 INJECTION, POWDER, FOR SOLUTION INTRAMUSCULAR; INTRAVENOUS at 13:30

## 2019-04-05 RX ADMIN — ROCURONIUM BROMIDE 50 MG: 10 INJECTION, SOLUTION INTRAVENOUS at 13:30

## 2019-04-05 RX ADMIN — ONDANSETRON 4 MG: 2 INJECTION INTRAMUSCULAR; INTRAVENOUS at 14:45

## 2019-04-05 RX ADMIN — MIDAZOLAM HYDROCHLORIDE 2 MG: 1 INJECTION, SOLUTION INTRAMUSCULAR; INTRAVENOUS at 13:30

## 2019-04-05 RX ADMIN — OXYCODONE HYDROCHLORIDE 10 MG: 5 SOLUTION ORAL at 15:45

## 2019-04-05 RX ADMIN — PROPOFOL 200 MG: 10 INJECTION, EMULSION INTRAVENOUS at 13:30

## 2019-04-05 RX ADMIN — FENTANYL CITRATE 100 MCG: 50 INJECTION, SOLUTION INTRAMUSCULAR; INTRAVENOUS at 13:30

## 2019-04-05 RX ADMIN — ONDANSETRON 4 MG: 2 INJECTION INTRAMUSCULAR; INTRAVENOUS at 15:44

## 2019-04-05 RX ADMIN — PROPOFOL 50 MG: 10 INJECTION, EMULSION INTRAVENOUS at 14:45

## 2019-04-05 RX ADMIN — SODIUM CHLORIDE, SODIUM LACTATE, POTASSIUM CHLORIDE, CALCIUM CHLORIDE: 600; 310; 30; 20 INJECTION, SOLUTION INTRAVENOUS at 13:28

## 2019-04-05 ASSESSMENT — PAIN SCALES - GENERAL: PAIN_LEVEL: 2

## 2019-04-05 NOTE — OR NURSING
Pt home with suprapubic catheter in place. Out put 300mls of light pink urine.     Discharge information reviewed with patient and responsible adult. No questions or concerns at this time.     IV discontinued.     See vital sign flowsheets  for discharge details.

## 2019-04-05 NOTE — ANESTHESIA PROCEDURE NOTES
Airway  Date/Time: 4/5/2019 1:33 PM  Performed by: TONYA CHEEMA  Authorized by: TONYA CHEEMA     Location:  OR  Urgency:  Elective  Indications for Airway Management:  Anesthesia  Spontaneous Ventilation: absent    Sedation Level:  Deep  Preoxygenated: Yes    Patient Position:  Sniffing  Final Airway Type:  Endotracheal airway  Final Endotracheal Airway:  ETT  Cuffed: Yes    Technique Used for Successful ETT Placement:  Direct laryngoscopy  Insertion Site:  Oral  Blade Type:  Frances  Laryngoscope Blade/Videolaryngoscope Blade Size:  3  ETT Size (mm):  7.5  Measured from:  Teeth  ETT to Teeth (cm):  23  Placement Verified by: auscultation and capnometry    Cormack-Lehane Classification:  Grade I - full view of glottis  Number of Attempts at Approach:  1  Ventilation Between Attempts:  BVM

## 2019-04-05 NOTE — OR NURSING
"Pt. to stage 2 and pt's. wife with pt.Suprapubic cath.draining adequately of reddish urine,no clots noted.Pt. verblalized that pain level is \"tolerable\" for D/C home plan of care.  "

## 2019-04-05 NOTE — OR SURGEON
Immediate Post OP Note    PreOp Diagnosis: Bulbar urethral stricture    PostOp Diagnosis: Bulbar urethral stricture                                  Multifocal penile urethral strictures    Procedure(s):  FLEXIBLE CYSTOSCOPY  RETROGRADE URETHROGRAM  ANTEGRADE CYSTOURETHROGRAM - Wound Class: Clean  OPEN SUPRAPUBIC CYSTOSTOMY    Surgeon(s):  Sarath Ayon M.D.    Anesthesiologist/Type of Anesthesia:  Anesthesiologist: Georges Altamirano M.D./General ETT    Surgical Staff:  Circulator: Melany Arita R.N.  Scrub Person: Dennis Donnelly  Radiology Technologist: Juli Saini    Specimens removed if any:  None    Estimated Blood Loss: N/A    Findings: 1 cm bulbar urethral stricture                  Multifocal penile urethral strictures    Complications: None  Drains: 22 Afghan torres to suprapubic tube        4/5/2019 3:12 PM Sarath Ayon M.D.

## 2019-04-05 NOTE — ANESTHESIA PREPROCEDURE EVALUATION
Relevant Problems   (+) HTN (hypertension)       Physical Exam    Airway   Mallampati: II  TM distance: >3 FB  Neck ROM: full       Cardiovascular - normal exam  Rhythm: regular  Rate: normal  (-) murmur     Dental - normal exam         Pulmonary - normal exam  Breath sounds clear to auscultation     Abdominal    Neurological - normal exam                 Anesthesia Plan    ASA 1       Plan - general       Airway plan will be ETT        Induction: intravenous          Informed Consent:      Use of blood products discussed with: patient whom.

## 2019-04-05 NOTE — ANESTHESIA POSTPROCEDURE EVALUATION
Patient: Ra Smith    Procedure Summary     Date:  04/05/19 Room / Location:  [facility] 11 / SURGERY [facility]    Anesthesia Start:  1328 Anesthesia Stop:  1507    Procedures:       SUPRAPUBIC CATH PLACEMENT (N/A Abdomen)      CYSTOSCOPY- FOR CYSTOGRAM (N/A Bladder) Diagnosis:  (POS-TRAUMATIC BULBOUS URETHRAL STRICTURE )    Surgeon:  Sarath Ayon M.D. Responsible Provider:  Georges Altamirano M.D.    Anesthesia Type:  general ASA Status:  1          Final Anesthesia Type: general  Last vitals  BP   Blood Pressure: 134/98, NIBP: 106/71    Temp   36.4 °C (97.6 °F)    Pulse   Pulse: 71, Heart Rate (Monitored): 97   Resp   16    SpO2   95 %      Anesthesia Post Evaluation    Patient location during evaluation: PACU  Patient participation: complete - patient participated  Level of consciousness: awake and alert  Pain score: 2    Airway patency: patent  Anesthetic complications: no  Cardiovascular status: hemodynamically stable  Respiratory status: acceptable  Hydration status: euvolemic    PONV: none           Nurse Pain Score: 0 (NPRS)

## 2019-04-05 NOTE — ANESTHESIA QCDR
2019 [facility] Clinical Data Registry (for Quality Improvement)     Postoperative nausea/vomiting risk protocol (Adult = 18 yrs and Pediatric 3-17 yrs)- (430 and 463)  General inhalation anesthetic (NOT TIVA) with PONV risk factors: Yes  Provision of anti-emetic therapy with at least 2 different classes of agents: Yes   Patient DID NOT receive anti-emetic therapy and reason is documented in Medical Record:  N/A    Multimodal Pain Management- (AQI59)  Patient undergoing Elective Surgery (i.e. Outpatient, or ASC, or Prescheduled Surgery prior to Hospital Admission): Yes  Use of Multimodal Pain Management, two or more drugs and/or interventions, NOT including systemic opioids: Yes   Exception: Documented allergy to multiple classes of analgesics:  N/A    PACU assessment of acute postoperative pain prior to Anesthesia Care End- Applies to Patients Age = 18- (ABG7)  Initial PACU pain score is which of the following: < 7/10  Patient unable to report pain score: N/A    Post-anesthetic transfer of care checklist/protocol to PACU/ICU- (426 and 427)  Upon conclusion of case, patient transferred to which of the following locations: PACU/Non-ICU  Use of transfer checklist/protocol: Yes  Exclusion: Service Performed in Patient Hospital Room (and thus did not require transfer): N/A    PACU Reintubation- (AQI31)  General anesthesia requiring endotracheal intubation (ETT) along with subsequent extubation in OR or PACU: No  Required reintubation in the PACU: N/A  Extubation was a planned trial documented in the medical record prior to removal of the original airway device: N/A    Unplanned admission to ICU related to anesthesia service up through end of PACU care- (MD51)  Unplanned admission to ICU (not initially anticipated at anesthesia start time): No

## 2019-04-05 NOTE — ANESTHESIA TIME REPORT
Anesthesia Start and Stop Event Times     Date Time Event    4/5/2019 1328 Anesthesia Start     1507 Anesthesia Stop        Responsible Staff  04/05/19    Name Role Begin End    Georges Altamirano M.D. Anesth 1328 1507        Preop Diagnosis (Free Text):  Pre-op Diagnosis     POS-TRAUMATIC BULBOUS URETHRAL STRICTURE         Preop Diagnosis (Codes):  Diagnosis Information     Diagnosis Code(s):         Post op Diagnosis  Stricture of male urethra      Premium Reason  A. 3PM - 7AM    Comments:

## 2019-04-05 NOTE — OR NURSING
"Pt arrived on unit with suprapubic catheter in place.  Assumed care of patient at approx 1614.  Patient alert and oriented x 4. See flowsheets for VS.  Pain is rated 3/10 \"tolerable.\"     Call light and personal belongings within reach. Gurney in lowest position. Monitor alarms set appropriately.    Dressing has scant old drainage around catheter site. See flowsheets for detailed wound documentation.           "

## 2019-04-05 NOTE — DISCHARGE INSTRUCTIONS
ACTIVITY: Rest and take it easy for the first 24 hours.  A responsible adult is recommended to remain with you during that time.  It is normal to feel sleepy.  We encourage you to not do anything that requires balance, judgment or coordination.    MILD FLU-LIKE SYMPTOMS ARE NORMAL. YOU MAY EXPERIENCE GENERALIZED MUSCLE ACHES, THROAT IRRITATION, HEADACHE AND/OR SOME NAUSEA.    FOR 24 HOURS DO NOT:  Drive, operate machinery or run household appliances.  Drink beer or alcoholic beverages.   Make important decisions or sign legal documents.    SPECIAL INSTRUCTIONS:   Brian catheter for Supra Pubic  tube to leg bag and night bag drainage.  Finish Levaquin course as prescribe pre operatively.  Diet: Clears ADAT to regular.      Cystoscopy  Cystoscopy is a procedure that is used to help diagnose and sometimes treat conditions that affect that lower urinary tract. The lower urinary tract includes the bladder and the tube that drains urine from the bladder out of the body (urethra). Cystoscopy is performed with a thin, tube-shaped instrument with a light and camera at the end (cystoscope). The cystoscope may be hard (rigid) or flexible, depending on the goal of the procedure. The cystoscope is inserted through the urethra, into the bladder.  Cystoscopy may be recommended if you have:  · Urinary tractinfections that keep coming back (recurring).  · Blood in the urine (hematuria).  · Loss of bladder control (urinary incontinence) or an overactive bladder.  · Unusual cells found in a urine sample.  · A blockage in the urethra.  · Painful urination.  · An abnormality in the bladder found during an intravenous pyelogram (IVP) or CT scan.  Cystoscopy may also be done to remove a sample of tissue to be examined under a microscope (biopsy).  Tell a health care provider about:  · Any allergies you have.  · All medicines you are taking, including vitamins, herbs, eye drops, creams, and over-the-counter medicines.  · Any problems you  or family members have had with anesthetic medicines.  · Any blood disorders you have.  · Any surgeries you have had.  · Any medical conditions you have.  · Whether you are pregnant or may be pregnant.  What are the risks?  Generally, this is a safe procedure. However, problems may occur, including:  · Infection.  · Bleeding.  · Allergic reactions to medicines.  · Damage to other structures or organs.  What happens before the procedure?  · Ask your health care provider about:  ¨ Changing or stopping your regular medicines. This is especially important if you are taking diabetes medicines or blood thinners.  ¨ Taking medicines such as aspirin and ibuprofen. These medicines can thin your blood. Do not take these medicines before your procedure if your health care provider instructs you not to.  · Follow instructions from your health care provider about eating or drinking restrictions.  · You may be given antibiotic medicine to help prevent infection.  · You may have an exam or testing, such as X-rays of the bladder, urethra, or kidneys.  · You may have urine tests to check for signs of infection.  · Plan to have someone take you home after the procedure.  What happens during the procedure?  · To reduce your risk of infection, your health care team will wash or sanitize their hands.  · You will be given one or more of the following:  ¨ A medicine to help you relax (sedative).  ¨ A medicine to numb the area (local anesthetic).  · The area around the opening of your urethra will be cleaned.  · The cystoscope will be passed through your urethra into your bladder.  · Germ-free (sterile) fluid will flow through the cystoscope to fill your bladder. The fluid will stretch your bladder so that your surgeon can clearly examine your bladder walls.  · The cystoscope will be removed and your bladder will be emptied.  The procedure may vary among health care providers and hospitals.  What happens after the procedure?  · You may  have some soreness or pain in your abdomen and urethra. Medicines will be available to help you.  · You may have some blood in your urine.  · Do not drive for 24 hours if you received a sedative.  This information is not intended to replace advice given to you by your health care provider. Make sure you discuss any questions you have with your health care provider.    DIET: To avoid nausea, slowly advance diet as tolerated, avoiding spicy or greasy foods for the first day.  Add more substantial food to your diet according to your physician's instructions.   INCREASE FLUIDS AND FIBER TO AVOID CONSTIPATION.    SURGICAL DRESSING/BATHING:Follow-up in office in 10-14 days for staple removal    Keep wound clean and dry for 48 hour then OK to shower    FOLLOW-UP APPOINTMENT:  A follow-up appointment should be arranged with your doctor in 1-2 weeks ; call to schedule.    You should CALL YOUR PHYSICIAN if you develop:  Fever greater than 101 degrees F.  Pain not relieved by medication, or persistent nausea or vomiting.  Excessive bleeding (blood soaking through dressing) or unexpected drainage from the wound.  Extreme redness or swelling around the incision site, drainage of pus or foul smelling drainage.  Inability to urinate or empty your bladder within 8 hours.  Problems with breathing or chest pain.    You should call 911 if you develop problems with breathing or chest pain.  If you are unable to contact your doctor or surgical center, you should go to the nearest emergency room or urgent care center.  Physician's telephone Dr Ayon  #: [phone]    If any questions arise, call your doctor.  If your doctor is not available, please feel free to call the Surgical Center at {Surgical Dept Numbers:[id number]}.  The Center is open Monday through Friday from 7AM to 7PM.  You can also call the HEALTH HOTLINE open 24 hours/day, 7 days/week and speak to a nurse at [phone], or toll free at [phone].    A registered nurse  may call you a few days after your surgery to see how you are doing after your procedure.    MEDICATIONS: Resume taking daily medication.  Take prescribed pain medication with food.  If no medication is prescribed, you may take non-aspirin pain medication if needed.  PAIN MEDICATION CAN BE VERY CONSTIPATING.  Take a stool softener or laxative such as senokot, pericolace, or milk of magnesia if needed.    Prescription given for Oxycodone.  Last pain medication given at 3:38.    If your physician has prescribed pain medication that includes Acetaminophen (Tylenol), do not take additional Acetaminophen (Tylenol) while taking the prescribed medication.    Depression / Suicide Risk    As you are discharged from this [facility] facility, it is important to learn how to keep safe from harming yourself.    Recognize the warning signs:  · Abrupt changes in personality, positive or negative- including increase in energy   · Giving away possessions  · Change in eating patterns- significant weight changes-  positive or negative  · Change in sleeping patterns- unable to sleep or sleeping all the time   · Unwillingness or inability to communicate  · Depression  · Unusual sadness, discouragement and loneliness  · Talk of wanting to die  · Neglect of personal appearance   · Rebelliousness- reckless behavior  · Withdrawal from people/activities they love  · Confusion- inability to concentrate     If you or a loved one observes any of these behaviors or has concerns about self-harm, here's what you can do:  · Talk about it- your feelings and reasons for harming yourself  · Remove any means that you might use to hurt yourself (examples: pills, rope, extension cords, firearm)  · Get professional help from the community (Mental Health, Substance Abuse, psychological counseling)  · Do not be alone:Call your Safe Contact- someone whom you trust who will be there for you.  · Call your local CRISIS HOTLINE 841-5550 or  865-827-0518  · Call your local Children's Mobile Crisis Response Team [place] (612) 154-8963 or www.[employer].SoCAT  · Call the toll free National Suicide Prevention Hotlines   · National Suicide Prevention Lifeline 635-884-EOGA (7157)  · National [employer] Network 800-SUICIDE (925-9613)

## 2019-04-05 NOTE — ADDENDUM NOTE
Addendum  created 04/05/19 1511 by Georges Altamirano M.D.    Order list changed, Order sets accessed

## 2019-04-07 NOTE — OP REPORT
DATE OF SERVICE:  04/05/2019    PREOPERATIVE DIAGNOSES:  1.  Bulbar urethral stricture.  2.  Penile urethral strictures.  3.  Indwelling 10-Gabonese suprapubic tube.    SURGEON:  Sarath Ayon MD    ANESTHESIA:  General endotracheal.    ANESTHESIOLOGIST:  Georges Altamirano MD    OPERATIONS AND PROCEDURES PERFORMED:  1.  Flexible cystourethroscopy.  2.  Retrograde ureteropyelogram.  3.  Antegrade cystogram with cystoureteroscopy.  4.  Open suprapubic tube placement, 22-Gabonese Brian.    COMPLICATIONS:  None.    DRAINS:  A 22-Gabonese Brian to gravity.    INDICATIONS:  The patient is a 59-year-old male with a history of bulbar   urethral stricture.  He required a suprapubic tube when he was in retention   and has prior history of instrumentation in the operating room requiring a   complex catheterization.  He had a retrograde ureteropyelogram, which was   performed and shows a pinpoint stricture, but no contrast went above the   stricture, so it is difficult to assess the length of the stricture, and the   suprapubic tube positioned by interventional radiology at [facility] is positioned on top of the pubic symphysis, which would   preclude manipulation during any other surgery.  Previously in the office, I   had performed a flexible ureteroscopy where he was found to have multiple   small urethral strictures and then a tight bulbar stricture and changed his   suprapubic tube from 12-Gabonese to 10-Gabonese tube and discussed with the   patient in the presence of the wife my recommendation for an exam under   anesthesia with retrograde cystourethroscopy, urethrogram, open suprapubic   tube versus percutaneous suprapubic tube placement for assessment of the   length of location of the urethral stricture.  I explained to the patient I   also needed to move the suprapubic tube up higher as access is needed and   because of the angle, this could not really be safely dilated as the tube was   placed straight  down near the bladder neck at the pubic symphysis.  Prior to   surgery, I discussed with the patient the risks of the procedure including but   not limited to risk of perioperative urinary tract infection, risk of   urosepsis, risk of injury to adjacent organs with the open suprapubic tube   placement including the colon and bowel, risk of bleeding, potential risk for   urinary retention as well as pain and swelling.  We also discussed the   perioperative risk of deep vein thrombosis, pulmonary embolism, aspiration   pneumonia, heart attack, stroke and death.  An informed consent was given to   me by the patient to proceed.    DESCRIPTION IN DETAIL:  After informed consent was obtained, the patient was   brought to the operating room and placed in supine.  Bilateral sequential   compression devices were in place and activated, and then a general   endotracheal anesthetic was administered by Dr. Georges Altamirano in a balanced   fashion.  The patient was initially positioned in a low modified lithotomy   position and the operative area was shaved, Betadine prepped and draped in the   usual sterile fashion.  His indwelling 10-Guinean suprapubic tube was prepped   sterilely in the field.  At this point in time, a timeout was called.  All   members of the operative team agree as to the patient's name and procedure to   be performed.  Without objections, I began the procedure.  I passed the   flexible cystoscope per urethra and the anterior urethra exhibited numerous   small penile urethral strictures suggesting a possible post inflammatory   etiology.  I did take a picture of these for documentation.  I was unable to   advance anything beyond a tight pinpoint stricture and at this point in time,   performed a retrograde urethrogram, which showed no passage of dye beyond this   area.  Subsequently, I went above and I filled the bladder with sterile   saline and then proceeded to make an incision approximately 3 fingerbreadths    above the pubic symphysis.  Dissection was carried through the skin with a 15   blade scalpel through Camper's fascia with coagulation current.  Retractors   were placed.  The fascia was opened for approximately 3.5 cm.  I then   dissected down, identified the perivesical fat, which was removed.  The   bladder muscle was identified, it was elevated with 2 Allis, I then made a   small incision.  Once I entered the bladder, clear fluid was obtained.  I   advanced the flexible cystoscope per the bladder and performed an anterograde   cystourethrogram.  I was able to advance through the bladder neck through the   prostatic fossa, which measured about 3.5-4 cm.  The verumontanum was   identified.  When I exited the external sphincter region, I was able to see   the tight stricture.  This stricture was evaluated and it was in the bulbar   urethra at the mid bulb to distal bulb.  At this point in time, contrast was   injected antegrade and with the scope withdrawn in the prostatic fossa and   right at the bladder neck, I injected contrast, you could see where the   contrast abutted where I had previously injected contrast in the retrograde,   so it was a tight stricture in the bulbar urethra.  No other abnormalities   were seen and so at this point in time, attention was directed towards   completing the open suprapubic tube placement.  I withdrew the cystoscope,   placed a 3-0 chromic suture in a pursestring fashion and then advanced a   22-Greek Brian into the bladder.  This was secured with a chromic suture.    Attention was then directed towards inflating the balloon with 25 mL of   sterile water, was elevated superiorly and then the fascia was closed with a   3-0 PDS suture.  Once the fascia was closed, subcutaneous tissues were   copiously irrigated and then the skin was reapproximated around the suprapubic   tube with 3 staples on each side.  Suprapubic tube was secured to the skin   superiorly with a 3-0 silk  suture.  At the end of the case, the catheter was   irrigated and drained without any blood and his old suprapubic tube was   removed.  A sterile dressing was applied, a drain dressing, and at the end of   the case, he tolerated the procedure well without any complication.  Sponge,   instrument and needle counts were correct x2.  He was awakened in the   operating room and transferred to the recovery room where he arrived in stable   condition.       ____________________________________     MD PATRICE Godinez / JILLIAN    DD:  04/07/2019 16:26:20  DT:  04/07/2019 16:42:11    D#:  [id number]  Job#:  [id number]

## 2019-04-16 ENCOUNTER — HOSPITAL ENCOUNTER (OUTPATIENT)
Dept: LAB | Facility: MEDICAL CENTER | Age: 59
End: 2019-04-16
Attending: PHYSICIAN ASSISTANT
Payer: COMMERCIAL

## 2019-04-16 PROCEDURE — 87086 URINE CULTURE/COLONY COUNT: CPT

## 2019-04-19 LAB
BACTERIA UR CULT: NORMAL
SIGNIFICANT IND 70042: NORMAL
SITE SITE: NORMAL
SOURCE SOURCE: NORMAL

## 2019-04-26 DIAGNOSIS — Z01.812 PRE-OPERATIVE LABORATORY EXAMINATION: ICD-10-CM

## 2019-04-26 LAB
APPEARANCE UR: ABNORMAL
BACTERIA #/AREA URNS HPF: ABNORMAL /HPF
BILIRUB UR QL STRIP.AUTO: NEGATIVE
COLOR UR: YELLOW
EPI CELLS #/AREA URNS HPF: NEGATIVE /HPF
GLUCOSE UR STRIP.AUTO-MCNC: NEGATIVE MG/DL
HYALINE CASTS #/AREA URNS LPF: ABNORMAL /LPF
KETONES UR STRIP.AUTO-MCNC: NEGATIVE MG/DL
LEUKOCYTE ESTERASE UR QL STRIP.AUTO: ABNORMAL
MICRO URNS: ABNORMAL
NITRITE UR QL STRIP.AUTO: NEGATIVE
PH UR STRIP.AUTO: 5.5 [PH]
PROT UR QL STRIP: 100 MG/DL
RBC # URNS HPF: ABNORMAL /HPF
RBC UR QL AUTO: ABNORMAL
SP GR UR STRIP.AUTO: 1.02
UROBILINOGEN UR STRIP.AUTO-MCNC: 0.2 MG/DL
WBC #/AREA URNS HPF: ABNORMAL /HPF

## 2019-04-26 PROCEDURE — 87077 CULTURE AEROBIC IDENTIFY: CPT

## 2019-04-26 PROCEDURE — 81001 URINALYSIS AUTO W/SCOPE: CPT

## 2019-04-26 PROCEDURE — 87086 URINE CULTURE/COLONY COUNT: CPT

## 2019-04-26 PROCEDURE — 87186 SC STD MICRODIL/AGAR DIL: CPT

## 2019-04-26 RX ORDER — SULFAMETHOXAZOLE AND TRIMETHOPRIM 800; 160 MG/1; MG/1
1 TABLET ORAL 2 TIMES DAILY
COMMUNITY
Start: 2019-04-28 | End: 2020-06-02

## 2019-04-30 ENCOUNTER — ANESTHESIA EVENT (OUTPATIENT)
Dept: SURGERY | Facility: MEDICAL CENTER | Age: 59
End: 2019-04-30
Payer: COMMERCIAL

## 2019-05-01 ENCOUNTER — HOSPITAL ENCOUNTER (OUTPATIENT)
Facility: MEDICAL CENTER | Age: 59
End: 2019-05-01
Attending: UROLOGY | Admitting: UROLOGY
Payer: COMMERCIAL

## 2019-05-01 ENCOUNTER — ANESTHESIA (OUTPATIENT)
Dept: SURGERY | Facility: MEDICAL CENTER | Age: 59
End: 2019-05-01
Payer: COMMERCIAL

## 2019-05-01 VITALS
HEART RATE: 76 BPM | TEMPERATURE: 97 F | HEIGHT: 73 IN | DIASTOLIC BLOOD PRESSURE: 94 MMHG | RESPIRATION RATE: 14 BRPM | SYSTOLIC BLOOD PRESSURE: 129 MMHG | WEIGHT: 259.04 LBS | BODY MASS INDEX: 34.33 KG/M2 | OXYGEN SATURATION: 97 %

## 2019-05-01 LAB — PATHOLOGY CONSULT NOTE: NORMAL

## 2019-05-01 PROCEDURE — 501445 HCHG STAPLER, SKIN DISP: Performed by: UROLOGY

## 2019-05-01 PROCEDURE — A9270 NON-COVERED ITEM OR SERVICE: HCPCS | Performed by: ANESTHESIOLOGY

## 2019-05-01 PROCEDURE — 160009 HCHG ANES TIME/MIN: Performed by: UROLOGY

## 2019-05-01 PROCEDURE — 501838 HCHG SUTURE GENERAL: Performed by: UROLOGY

## 2019-05-01 PROCEDURE — 700101 HCHG RX REV CODE 250

## 2019-05-01 PROCEDURE — A4338 INDWELLING CATHETER LATEX: HCPCS | Performed by: UROLOGY

## 2019-05-01 PROCEDURE — C1769 GUIDE WIRE: HCPCS | Performed by: UROLOGY

## 2019-05-01 PROCEDURE — 88305 TISSUE EXAM BY PATHOLOGIST: CPT

## 2019-05-01 PROCEDURE — A6403 STERILE GAUZE>16 <= 48 SQ IN: HCPCS | Performed by: UROLOGY

## 2019-05-01 PROCEDURE — 160046 HCHG PACU - 1ST 60 MINS PHASE II: Performed by: UROLOGY

## 2019-05-01 PROCEDURE — 500879 HCHG PACK, CYSTO: Performed by: UROLOGY

## 2019-05-01 PROCEDURE — 502240 HCHG MISC OR SUPPLY RC 0272: Performed by: UROLOGY

## 2019-05-01 PROCEDURE — 700102 HCHG RX REV CODE 250 W/ 637 OVERRIDE(OP): Performed by: ANESTHESIOLOGY

## 2019-05-01 PROCEDURE — 160036 HCHG PACU - EA ADDL 30 MINS PHASE I: Performed by: UROLOGY

## 2019-05-01 PROCEDURE — 700101 HCHG RX REV CODE 250: Performed by: ANESTHESIOLOGY

## 2019-05-01 PROCEDURE — 500380 HCHG DRAIN, PENROSE 1/4X12: Performed by: UROLOGY

## 2019-05-01 PROCEDURE — A4357 BEDSIDE DRAINAGE BAG: HCPCS | Performed by: UROLOGY

## 2019-05-01 PROCEDURE — 160048 HCHG OR STATISTICAL LEVEL 1-5: Performed by: UROLOGY

## 2019-05-01 PROCEDURE — 501138 HCHG PLUG, FOLEY CATH: Performed by: UROLOGY

## 2019-05-01 PROCEDURE — 160041 HCHG SURGERY MINUTES - EA ADDL 1 MIN LEVEL 4: Performed by: UROLOGY

## 2019-05-01 PROCEDURE — 700111 HCHG RX REV CODE 636 W/ 250 OVERRIDE (IP): Performed by: ANESTHESIOLOGY

## 2019-05-01 PROCEDURE — 160002 HCHG RECOVERY MINUTES (STAT): Performed by: UROLOGY

## 2019-05-01 PROCEDURE — 110371 HCHG SHELL REV 272: Performed by: UROLOGY

## 2019-05-01 PROCEDURE — 500002 HCHG ADHESIVE, DERMABOND: Performed by: UROLOGY

## 2019-05-01 PROCEDURE — 160025 RECOVERY II MINUTES (STATS): Performed by: UROLOGY

## 2019-05-01 PROCEDURE — A6402 STERILE GAUZE <= 16 SQ IN: HCPCS | Performed by: UROLOGY

## 2019-05-01 PROCEDURE — 160029 HCHG SURGERY MINUTES - 1ST 30 MINS LEVEL 4: Performed by: UROLOGY

## 2019-05-01 PROCEDURE — 501329 HCHG SET, CYSTO IRRIG Y TUR: Performed by: UROLOGY

## 2019-05-01 PROCEDURE — 160035 HCHG PACU - 1ST 60 MINS PHASE I: Performed by: UROLOGY

## 2019-05-01 RX ORDER — CEFAZOLIN SODIUM 1 G/3ML
INJECTION, POWDER, FOR SOLUTION INTRAMUSCULAR; INTRAVENOUS PRN
Status: DISCONTINUED | OUTPATIENT
Start: 2019-05-01 | End: 2019-05-01 | Stop reason: SURG

## 2019-05-01 RX ORDER — LABETALOL HYDROCHLORIDE 5 MG/ML
5 INJECTION, SOLUTION INTRAVENOUS
Status: DISCONTINUED | OUTPATIENT
Start: 2019-05-01 | End: 2019-05-01 | Stop reason: HOSPADM

## 2019-05-01 RX ORDER — HALOPERIDOL 5 MG/ML
1 INJECTION INTRAMUSCULAR
Status: DISCONTINUED | OUTPATIENT
Start: 2019-05-01 | End: 2019-05-01 | Stop reason: HOSPADM

## 2019-05-01 RX ORDER — BUPIVACAINE HYDROCHLORIDE 5 MG/ML
INJECTION, SOLUTION EPIDURAL; INTRACAUDAL
Status: DISCONTINUED | OUTPATIENT
Start: 2019-05-01 | End: 2019-05-01 | Stop reason: HOSPADM

## 2019-05-01 RX ORDER — MIDAZOLAM HYDROCHLORIDE 1 MG/ML
1 INJECTION INTRAMUSCULAR; INTRAVENOUS
Status: DISCONTINUED | OUTPATIENT
Start: 2019-05-01 | End: 2019-05-01 | Stop reason: HOSPADM

## 2019-05-01 RX ORDER — MEPERIDINE HYDROCHLORIDE 25 MG/ML
6.25 INJECTION INTRAMUSCULAR; INTRAVENOUS; SUBCUTANEOUS
Status: DISCONTINUED | OUTPATIENT
Start: 2019-05-01 | End: 2019-05-01 | Stop reason: HOSPADM

## 2019-05-01 RX ORDER — SODIUM CHLORIDE, SODIUM LACTATE, POTASSIUM CHLORIDE, CALCIUM CHLORIDE 600; 310; 30; 20 MG/100ML; MG/100ML; MG/100ML; MG/100ML
INJECTION, SOLUTION INTRAVENOUS CONTINUOUS
Status: DISCONTINUED | OUTPATIENT
Start: 2019-05-01 | End: 2019-05-01 | Stop reason: HOSPADM

## 2019-05-01 RX ORDER — OXYCODONE HCL 5 MG/5 ML
5 SOLUTION, ORAL ORAL
Status: DISCONTINUED | OUTPATIENT
Start: 2019-05-01 | End: 2019-05-01 | Stop reason: HOSPADM

## 2019-05-01 RX ORDER — LIDOCAINE HYDROCHLORIDE 10 MG/ML
INJECTION, SOLUTION INFILTRATION; PERINEURAL
Status: COMPLETED
Start: 2019-05-01 | End: 2019-05-01

## 2019-05-01 RX ORDER — DEXAMETHASONE SODIUM PHOSPHATE 4 MG/ML
INJECTION, SOLUTION INTRA-ARTICULAR; INTRALESIONAL; INTRAMUSCULAR; INTRAVENOUS; SOFT TISSUE PRN
Status: DISCONTINUED | OUTPATIENT
Start: 2019-05-01 | End: 2019-05-01 | Stop reason: SURG

## 2019-05-01 RX ORDER — ACETAMINOPHEN 500 MG
1000 TABLET ORAL ONCE
Status: COMPLETED | OUTPATIENT
Start: 2019-05-01 | End: 2019-05-01

## 2019-05-01 RX ORDER — HYDRALAZINE HYDROCHLORIDE 20 MG/ML
5 INJECTION INTRAMUSCULAR; INTRAVENOUS
Status: DISCONTINUED | OUTPATIENT
Start: 2019-05-01 | End: 2019-05-01 | Stop reason: HOSPADM

## 2019-05-01 RX ORDER — OXYCODONE HCL 10 MG/1
10 TABLET, FILM COATED, EXTENDED RELEASE ORAL ONCE
Status: COMPLETED | OUTPATIENT
Start: 2019-05-01 | End: 2019-05-01

## 2019-05-01 RX ORDER — DIPHENHYDRAMINE HYDROCHLORIDE 50 MG/ML
12.5 INJECTION INTRAMUSCULAR; INTRAVENOUS
Status: DISCONTINUED | OUTPATIENT
Start: 2019-05-01 | End: 2019-05-01 | Stop reason: HOSPADM

## 2019-05-01 RX ORDER — OXYCODONE HCL 5 MG/5 ML
10 SOLUTION, ORAL ORAL
Status: DISCONTINUED | OUTPATIENT
Start: 2019-05-01 | End: 2019-05-01 | Stop reason: HOSPADM

## 2019-05-01 RX ORDER — HYDROMORPHONE HYDROCHLORIDE 2 MG/ML
INJECTION, SOLUTION INTRAMUSCULAR; INTRAVENOUS; SUBCUTANEOUS PRN
Status: DISCONTINUED | OUTPATIENT
Start: 2019-05-01 | End: 2019-05-01 | Stop reason: SURG

## 2019-05-01 RX ORDER — GENTAMICIN SULFATE 40 MG/ML
INJECTION, SOLUTION INTRAMUSCULAR; INTRAVENOUS PRN
Status: DISCONTINUED | OUTPATIENT
Start: 2019-05-01 | End: 2019-05-01 | Stop reason: SURG

## 2019-05-01 RX ORDER — ONDANSETRON 2 MG/ML
4 INJECTION INTRAMUSCULAR; INTRAVENOUS
Status: DISCONTINUED | OUTPATIENT
Start: 2019-05-01 | End: 2019-05-01 | Stop reason: HOSPADM

## 2019-05-01 RX ORDER — OXYBUTYNIN CHLORIDE 10 MG/1
10 TABLET, EXTENDED RELEASE ORAL DAILY
COMMUNITY
Start: 2019-04-16 | End: 2020-06-02

## 2019-05-01 RX ORDER — LABETALOL HYDROCHLORIDE 5 MG/ML
INJECTION, SOLUTION INTRAVENOUS PRN
Status: DISCONTINUED | OUTPATIENT
Start: 2019-05-01 | End: 2019-05-01 | Stop reason: SURG

## 2019-05-01 RX ADMIN — OXYCODONE HYDROCHLORIDE 10 MG: 10 TABLET, FILM COATED, EXTENDED RELEASE ORAL at 06:44

## 2019-05-01 RX ADMIN — MIDAZOLAM HYDROCHLORIDE 2 MG: 1 INJECTION, SOLUTION INTRAMUSCULAR; INTRAVENOUS at 07:30

## 2019-05-01 RX ADMIN — SODIUM CHLORIDE, SODIUM LACTATE, POTASSIUM CHLORIDE, CALCIUM CHLORIDE: 600; 310; 30; 20 INJECTION, SOLUTION INTRAVENOUS at 06:44

## 2019-05-01 RX ADMIN — LIDOCAINE HYDROCHLORIDE 100 MG: 20 INJECTION, SOLUTION INFILTRATION; PERINEURAL at 07:34

## 2019-05-01 RX ADMIN — LIDOCAINE HYDROCHLORIDE 0.5 ML: 10 INJECTION, SOLUTION INFILTRATION; PERINEURAL at 06:44

## 2019-05-01 RX ADMIN — PROPOFOL 200 MG: 10 INJECTION, EMULSION INTRAVENOUS at 07:34

## 2019-05-01 RX ADMIN — ACETAMINOPHEN 1000 MG: 500 TABLET, FILM COATED ORAL at 06:44

## 2019-05-01 RX ADMIN — HYDROMORPHONE HYDROCHLORIDE 2 MG: 2 INJECTION, SOLUTION INTRAMUSCULAR; INTRAVENOUS; SUBCUTANEOUS at 07:34

## 2019-05-01 RX ADMIN — CEFAZOLIN 3 G: 330 INJECTION, POWDER, FOR SOLUTION INTRAMUSCULAR; INTRAVENOUS at 07:34

## 2019-05-01 RX ADMIN — LABETALOL HYDROCHLORIDE 10 MG: 5 INJECTION, SOLUTION INTRAVENOUS at 11:38

## 2019-05-01 RX ADMIN — GENTAMICIN SULFATE 160 MG: 40 INJECTION, SOLUTION INTRAMUSCULAR; INTRAVENOUS at 07:37

## 2019-05-01 RX ADMIN — CEFAZOLIN 2 G: 330 INJECTION, POWDER, FOR SOLUTION INTRAMUSCULAR; INTRAVENOUS at 11:34

## 2019-05-01 RX ADMIN — DEXAMETHASONE SODIUM PHOSPHATE 8 MG: 4 INJECTION, SOLUTION INTRA-ARTICULAR; INTRALESIONAL; INTRAMUSCULAR; INTRAVENOUS; SOFT TISSUE at 07:34

## 2019-05-01 ASSESSMENT — PAIN SCALES - GENERAL: PAIN_LEVEL: 2

## 2019-05-01 NOTE — OR SURGEON
Immediate Post OP Note    PreOp Diagnosis: Bulbar urethral stricture                                Multiple penile urethral strictures    PostOp Diagnosis: As above    Procedure(s):  FLEXIBLE CYSTOSCOPY   EXCISION AND PRIMARY ANASTAMOSIS UREHTROPLASTY    Surgeon(s):  Sarath Ayon M.D.    Anesthesiologist/Type of Anesthesia:  Anesthesiologist: Dariel Huerta M.D./General LMA    Surgical Staff:  Circulator: Erlinda Brown R.N.  Relief Circulator: Lyndsay Sprague R.N.; Martinez Martin R.N.  Relief Scrub: Thuy Gross  Scrub Person: Dennis Donnelly    Specimens removed if any:  Spongiofibrosis with mid bulbar urethral stricture    Estimated Blood Loss: 400ml    Findings: 1.5 cm mid bulbar urethral stricture    Complications: None  Drains: 20 Albanian Silastic catheter in urethra               22 Albanian SP tube        5/1/2019 11:26 AM Sarath Ayon M.D.

## 2019-05-01 NOTE — ANESTHESIA PREPROCEDURE EVALUATION
Relevant Problems   (+) BPH (benign prostatic hyperplasia)   (+) Carpal tunnel syndrome on right   (+) Gout   (+) HTN (hypertension)       Physical Exam    Airway   Mallampati: II  TM distance: >3 FB  Neck ROM: full       Cardiovascular - normal exam  Rhythm: regular  Rate: normal  (-) murmur     Dental - normal exam         Pulmonary - normal exam  Breath sounds clear to auscultation     Abdominal   (+) obese  Abdomen: soft  Bowel sounds: normal   Neurological - normal exam                 Anesthesia Plan    ASA 2       Plan - general       Airway plan will be LMA        Induction: intravenous    Postoperative Plan: Postoperative administration of opioids is intended.    Pertinent diagnostic labs and testing reviewed    Informed Consent:    Anesthetic plan and risks discussed with patient.    Use of blood products discussed with: patient whom consented to blood products.

## 2019-05-01 NOTE — ANESTHESIA POSTPROCEDURE EVALUATION
Patient: Ra Smith    Procedure Summary     Date:  05/01/19 Room / Location:  [facility] OR 08 / SURGERY [facility]    Anesthesia Start:  0730 Anesthesia Stop:  1147    Procedures:       CYSTOSCOPY - FLEXIBLE (Bladder)      URETHROPLASTY - EXCISION AND PRIMARY ANASTAMOSIS (Urethra) Diagnosis:  (POSTINFECTIVE BULBOUS URETHRAL STRICTURE)    Surgeon:  Sarath Ayon M.D. Responsible Provider:  Dariel Huerta M.D.    Anesthesia Type:  general ASA Status:  2          Final Anesthesia Type: general  Last vitals  BP   Blood Pressure: 129/94    Temp   36.3 °C (97.4 °F)    Pulse   Pulse: 71   Resp   18    SpO2   97 %      Anesthesia Post Evaluation    Patient location during evaluation: PACU  Patient participation: complete - patient participated  Level of consciousness: sleepy but conscious  Pain score: 2    Airway patency: patent  Anesthetic complications: no  Cardiovascular status: hemodynamically stable  Respiratory status: acceptable  Hydration status: euvolemic    PONV: none           Nurse Pain Score: 0 (NPRS)

## 2019-05-01 NOTE — DISCHARGE INSTRUCTIONS
ACTIVITY: Rest and take it easy for the first 24 hours.  A responsible adult is recommended to remain with you during that time.  It is normal to feel sleepy.  We encourage you to not do anything that requires balance, judgment or coordination.    MILD FLU-LIKE SYMPTOMS ARE NORMAL. YOU MAY EXPERIENCE GENERALIZED MUSCLE ACHES, THROAT IRRITATION, HEADACHE AND/OR SOME NAUSEA.    FOR 24 HOURS DO NOT:  Drive, operate machinery or run household appliances.  Drink beer or alcoholic beverages.   Make important decisions or sign legal documents.    SPECIAL INSTRUCTIONS:   Diet: Clears ADAT to regular.  Activity: No lifting greater than 10 pounds for 3 weeks.  SP tube to catheter plug  Brian to gravity; teach leg bag and night bag use.  Follow-up with Dr. Ayon 5/20 for catheter removal.      DIET: To avoid nausea, slowly advance diet as tolerated, avoiding spicy or greasy foods for the first day.  Add more substantial food to your diet according to your physician's instructions.  INCREASE FLUIDS AND FIBER TO AVOID CONSTIPATION.    SURGICAL DRESSING/BATHING:      FOLLOW-UP APPOINTMENT:  A follow-up appointment should be arranged with your doctor on 5/20.    You should CALL YOUR PHYSICIAN if you develop:  Fever greater than 101 degrees F.  Pain not relieved by medication, or persistent nausea or vomiting.  Excessive bleeding (blood soaking through dressing) or unexpected drainage from the wound.  Extreme redness or swelling around the incision site, drainage of pus or foul smelling drainage.  Inability to urinate or empty your bladder within 8 hours.  Problems with breathing or chest pain.    You should call 911 if you develop problems with breathing or chest pain.  If you are unable to contact your doctor or surgical center, you should go to the nearest emergency room or urgent care center.  Physician's telephone #: [phone]    If any questions arise, call your doctor.  If your doctor is not available, please feel free  to call the Surgical Center at [phone].  The Center is open Monday through Friday from 7AM to 7PM.  You can also call the HEALTH HOTLINE open 24 hours/day, 7 days/week and speak to a nurse at [phone], or toll free at [phone].    A registered nurse may call you a few days after your surgery to see how you are doing after your procedure.    MEDICATIONS: Resume taking daily medication.  Take prescribed pain medication with food.  If no medication is prescribed, you may take non-aspirin pain medication if needed.  PAIN MEDICATION CAN BE VERY CONSTIPATING.  Take a stool softener or laxative such as senokot, pericolace, or milk of magnesia if needed.    Prescription medication at home.      If your physician has prescribed pain medication that includes Acetaminophen (Tylenol), do not take additional Acetaminophen (Tylenol) while taking the prescribed medication.    Depression / Suicide Risk    As you are discharged from this [facility] facility, it is important to learn how to keep safe from harming yourself.    Recognize the warning signs:  · Abrupt changes in personality, positive or negative- including increase in energy   · Giving away possessions  · Change in eating patterns- significant weight changes-  positive or negative  · Change in sleeping patterns- unable to sleep or sleeping all the time   · Unwillingness or inability to communicate  · Depression  · Unusual sadness, discouragement and loneliness  · Talk of wanting to die  · Neglect of personal appearance   · Rebelliousness- reckless behavior  · Withdrawal from people/activities they love  · Confusion- inability to concentrate     If you or a loved one observes any of these behaviors or has concerns about self-harm, here's what you can do:  · Talk about it- your feelings and reasons for harming yourself  · Remove any means that you might use to hurt yourself (examples: pills, rope, extension cords, firearm)  · Get professional help from  the community (Mental Health, Substance Abuse, psychological counseling)  · Do not be alone:Call your Safe Contact- someone whom you trust who will be there for you.  · Call your local CRISIS HOTLINE 837-4161 or 144-319-1324  · Call your local Children's Mobile Crisis Response Team [place] (758) 283-4774 or www.[address]  · Call the toll free National Suicide Prevention Hotlines   · National Suicide Prevention Lifeline 799-992-FJWQ (1774)  · National Hope Line Network 800-SUICIDE (194-4996)

## 2019-05-01 NOTE — OR NURSING
Pt A&OX4. VSS. Pt on room air. Suprapubic cath in place, plugged. Dressing CDI. Ureteral cath in place to down drain, 500 cc of clear yellow urine out. Athletic supporter and fluff gauze in place, CDI. Pt denies pain or nausea in pacu. Declined pain medication. Tolerated sips of water and ginger ale. Pt restless to sit up and stand up, tolerated well with standby assist - states he gets clausterphobic. Denied dizziness, remained groggy. No new prescriptions. Pt has pain medication at home. Report called to Julia Bhat RN. Pt to Phase II via estrellita.

## 2019-05-01 NOTE — ANESTHESIA TIME REPORT
Anesthesia Start and Stop Event Times     Date Time Event    5/1/2019 0730 Anesthesia Start     1147 Anesthesia Stop        Responsible Staff  05/01/19    Name Role Begin End    Dariel Huerta M.D. Anesth 0730 1147        Preop Diagnosis (Free Text):  Pre-op Diagnosis     POSTINFECTIVE BULBOUS URETHRAL STRICTURE        Preop Diagnosis (Codes):  Diagnosis Information     Diagnosis Code(s):         Post op Diagnosis  Postinfective bulbous urethral stricture      Premium Reason  Non-Premium    Comments:

## 2019-05-01 NOTE — ANESTHESIA PROCEDURE NOTES
Airway  Date/Time: 5/1/2019 7:35 AM  Performed by: CALEB STEEN  Authorized by: CALEB STEEN     Location:  OR  Urgency:  Elective  Indications for Airway Management:  Anesthesia  Spontaneous Ventilation: absent    Sedation Level:  Deep  Preoxygenated: Yes    MILS Maintained Throughout: No    Mask Difficulty Assessment:  1 - vent by mask  Final Airway Type:  Supraglottic airway  Final Supraglottic Airway:  Standard LMA  SGA Size:  5  Number of Attempts at Approach:  1

## 2019-05-01 NOTE — PROGRESS NOTES
Med rec updated and complete  Allergies reviewed  Interviewed pt with wife at bedside with permission from pt.  Pt reports no vitamins.  Pt reports no antibiotics in the last 30 days.

## 2019-05-02 NOTE — OP REPORT
DATE OF SERVICE:  05/01/2019    PREOPERATIVE DIAGNOSES:  1.  Bulbar urethral stricture.  2.  Multiple penile urethral strictures.  3.  History of urinary retention.    POSTOPERATIVE DIAGNOSES:  1.  Bulbar urethral stricture.  2.  Multiple penile urethral strictures.  3.  History of urinary retention.    OPERATION AND PROCEDURES PERFORMED:  1.  Flexible ureteroscopy.  2.  Excision and primary anastomosis urethroplasty for proximal to mid bulbar   urethral stricture.  3.  Suprapubic tube change.    SURGEON:  Sarath Ayon MD    ANESTHESIA:  General laryngeal mass.    ANESTHESIOLOGIST:  Dariel Huerta MD    COMPLICATIONS:  None.    DRAINS:  A 20-Macedonian Silastic catheter in the penile urethra to gravity and   #22-Macedonian suprapubic tube exchange to catheter plug.    INDICATIONS:  The patient is a 59-year-old gentleman with a history of complex   catheterization several years ago.  He went on to develop a urethral   stricture.  He required a suprapubic tube emergency at [facility] and was sent to me for evaluation.  At endoscopy he was found to have   panurethral stricture suggesting an infectious etiology and because of the   location of the suprapubic tube and inability to completely evaluate the   extent of the stricture, I recommended open suprapubic cystostomy with   antegrade and retrograde studies for definitive evaluation.  He was taken to   [facility] approximately 4 weeks ago, underwent a   retrograde urethrogram and ureteroscopy which showed panurethral penile   strictures as well as a tight bulbar stricture what looked to be the mid to   proximal bulbar urethra.  Anterograde cystoscopy was performed showing a   normal prostate and a bulbar stricture, cystogram confirmed this.    Subsequently, 22-Macedonian open suprapubic tube was positioned and left to mature   in the office thereafter the patient was counseled as to treatment options   and I recommended an excision  and primary anastomosis urethroplasty.  Prior to   the surgery, we discussed the risk of the procedure including, but not   limited to a risk of perioperative urinary tract infection, urosepsis, risk of   wound infection, risk of urethroperineal fistula, risk of failure of the   stricture repair in 5-8% of cases, need for chronic long-term followup.    Patient is also aware he still may have voiding dysfunction due to multiple   intermittent penile urethral strictures and he is aware that there are   theoretical risk of sexual dysfunction, although be at very rare.  There is   certainly a risk of penile shortening if a significant amount urethra was   resected as well as pain and swelling.  In addition, we discussed the risk of   bleeding and the perioperative risk of deep vein thrombosis, pulmonary   embolism, aspiration pneumonia and death.  Informed consent was given to me by   the patient to proceed in the presence of his wife.    DESCRIPTION IN DETAIL:  After informed consent was obtained, the patient was   brought to operating room and placed in supine.  A general laryngeal mask   anesthetic was administered by Dariel Huerta in a balanced fashion.  The   patient received intravenous Ancef and gentamicin 160 mg.  The patient is   positioned into modified lithotomy.  The operative area was shaved, Betadine   prepped and draped in usual sterile fashion.  His Brian catheter was   disconnected and the Brian was prepped with Betadine.  After occluding the   rectum with a 10/10 drape and placing an Ioban to hold the scrotum above and a   sterile field being accomplished, a surgical time-out was called.  All   members of the operative team agree as the patient's name, procedure to be   performed without objections, attention was directed to the procedure.    I performed the entire procedure with loupe magnification.  I made a lambda   incision and dissected through the skin with a 15 blade scalpel.  Dissection    was carried through the Garcia's fascia with coagulation current down to the   level of the bulbospongiosus muscle.  This was opened in exact midline   allowing me to identify the penile urethra.  I dissected superiorly up above   from the level of the corporal bodies superiorly for approximately 4 cm,   mobilizing the urethra, I then was able to pass a right angle behind the   urethra elevated this and freed up the urethra.  There was an area in the bulb   where there was spongiofibrosis clearly evident.  After mobilizing the bulbar   urethra and positioning a Que retractor for retraction, attention was   directed towards ureteroscopy.  I passed a flexible cystoscope down to the   level off stenosis.  He had numerous small strictures in the penile urethra   previously seen and documented and at this level I marked this.  I then passed   a bougie down to this level, this 14-Vietnamese bougie passed easily.  I left   this in position, withdrew the cystoscope and then I cut the urethra at this   point.  After I cut the urethra, I excised about 1 cm of the urethra where the   stricture was.  I spatulated the bulbar urethra dorsally and the penile   urethra ventrally.  After spatulation, I passed a 26-Vietnamese bougie on the   penile side and a 28-Vietnamese bougie on the bulbar side.  The mucosa was clearly   evident and I placed 3 posterior sutures from outside and after these sutures   were positioned in the bulbar urethral side at the apex of the spatulation,   they were now positioned with loupe magnification in the penile urethra.  Once   these 3 were positioned, general inspection showed again I could easily pass   a 26-Vietnamese bougie through the penile urethra and 28 Vietnamese proximally.  I   went ahead and placed 2 additional sutures and then proceeded to have my   assistant, the scrub tech take tension off the penile urethra with a hemostat   he held it down.  I tied the posterior sutures.  After these 5 sutures were    tied, the anastomosis was very strong and attention was directed toward   completing the anastomosis.  Of note, I did resect all the spongiofibrosis   prior to beginning the anastomosis involved in the bulbar and penile   stricture.  Of note, after tying these 5 sutures down, I proceeded to position   around the rest of the urethra for additional sutures from outside in   position and then they were positioned in the urethra.  I then passed a   20-Sami Silastic catheter down through the penile urethra without difficulty   and with a hemostat I was able to angle it into the ____, they went easily   and it was passed in the bladder, balloon was inflated with 10 mL of sterile   water.  I then positioned the remaining 4-0 PDS sutures which were RB1 needles   through the penile urethra to the appropriate level.  After these were   positioned, they were tied down from a lateral to anterior position and the   anastomosis was complete, hemostasis was excellent.  I then closed the   urethral tissue interrupted for hemostasis with 4-0 Vicryl sutures in an   interrupted fashion.  After these were positioned from lateral to anterior   position, hemostasis was excellent.  I did irrigate the wound copiously and   then I placed a hemostatic Surgiflo type agent with thrombin into the wound   for hemostasis.  I proceeded to take at the retraction looking for areas of   bleeding, there were none and then at this point in time, I reapproximated the   bulbospongiosus muscle over the urethra with interrupted 3-0 Vicryl sutures.    I then reapproximated the fascia with a 3-0 Vicryl suture in a running   fashion closing the wound superiorly to inferiorly.  I did place some   interrupted 3-0 Vicryls inferiorly near the peroneal tendon area for closure   of dead space and then the final layer of skin was closed with 4-0 Monocryl in   a subcuticular fashion.  Once this was closed, attention was directed toward   allowing the bladder to  drain, it drained clear.  I then removed the   previously positioned 22-Slovenian suprapubic tube.  After withdrawing it, a new   22-Slovenian catheter was positioned and catheter plug was placed.  At this point   in time, a dressing was applied upside and at the end of the case, sponge,   instrument, and needle counts were correct x2.  The patient tolerated the   procedure well.  I would note that I had him exaggerated lithotomy for the   first hour and half of the procedure, but took him out of this once the   anastomosis was complete and to take some pressure off his legs.  At the end   of the case, the patient was transferred from the operating room to the   recovery room where he arrived in stable condition.       ____________________________________     MD PATRICE Godinez / NTS    DD:  05/01/2019 21:28:03  DT:  05/01/2019 22:17:07    D#:  [id number]  Job#:  [id number]

## 2019-05-23 ENCOUNTER — HOSPITAL ENCOUNTER (OUTPATIENT)
Dept: LAB | Facility: MEDICAL CENTER | Age: 59
End: 2019-05-23
Attending: UROLOGY
Payer: COMMERCIAL

## 2019-05-23 PROCEDURE — 87186 SC STD MICRODIL/AGAR DIL: CPT

## 2019-05-23 PROCEDURE — 87077 CULTURE AEROBIC IDENTIFY: CPT

## 2019-05-23 PROCEDURE — 87086 URINE CULTURE/COLONY COUNT: CPT

## 2019-10-07 ENCOUNTER — HOSPITAL ENCOUNTER (OUTPATIENT)
Dept: RADIOLOGY | Facility: MEDICAL CENTER | Age: 59
End: 2019-10-07
Attending: ORTHOPAEDIC SURGERY
Payer: COMMERCIAL

## 2019-10-07 DIAGNOSIS — M54.50 CHRONIC LOW BACK PAIN, UNSPECIFIED BACK PAIN LATERALITY, UNSPECIFIED WHETHER SCIATICA PRESENT: ICD-10-CM

## 2019-10-07 DIAGNOSIS — G89.29 CHRONIC LOW BACK PAIN, UNSPECIFIED BACK PAIN LATERALITY, UNSPECIFIED WHETHER SCIATICA PRESENT: ICD-10-CM

## 2019-10-23 ENCOUNTER — HOSPITAL ENCOUNTER (OUTPATIENT)
Dept: RADIOLOGY | Facility: MEDICAL CENTER | Age: 59
End: 2019-10-23
Attending: ORTHOPAEDIC SURGERY
Payer: COMMERCIAL

## 2019-10-23 VITALS
HEIGHT: 73 IN | RESPIRATION RATE: 16 BRPM | HEART RATE: 83 BPM | OXYGEN SATURATION: 94 % | WEIGHT: 256.17 LBS | BODY MASS INDEX: 33.95 KG/M2 | TEMPERATURE: 98.4 F

## 2019-10-23 PROCEDURE — 72148 MRI LUMBAR SPINE W/O DYE: CPT

## 2019-10-23 PROCEDURE — 700111 HCHG RX REV CODE 636 W/ 250 OVERRIDE (IP)

## 2019-10-23 PROCEDURE — 700101 HCHG RX REV CODE 250

## 2019-10-23 NOTE — DISCHARGE INSTRUCTIONS
MRI ADULT DISCHARGE INSTRUCTIONS    You have been medicated today for your scan. Please follow the instructions below to ensure your safe recovery. If you have any questions or problems, feel free to call us at [phone] or [phone].     1.   Have someone stay with you to assist you as needed.    2.   Do not drive or operate any mechanical devices.    3.   Do not perform any activity that requires concentration. Make no major decisions over the next 24 hours.     4.   Be careful changing positions from laying down to sitting or standing, as you may become dizzy.     5.   Do not drink alcohol for 48 hours.    6.   There are no restrictions for eating your normal meals. Drink fluids.    7.   You may continue your usual medications for pain, tranquilizers, muscle relaxants or sedatives when awake.     8.   Tomorrow, you may continue your normal daily activities.     9.   Pressure dressing on 10 - 15 minutes. If swelling or bleeding occurs when removed, continue placing direct pressure on injection site for another 5 minutes, or until bleeding stops.       Fentanyl  What is this medicine?  You were given FENTANYL (FEN ta nil) for your procedure today, it is a pain reliever. It is used to treat breakthrough pain that your long acting pain medicine does not control. Do not use this medicine for a pain that will go away in a few days like pain from surgery, doctor or dentist visits.   This medicine may be used for other purposes; ask your health care provider or pharmacist if you have questions.  What side effects may I notice from receiving this medicine?  Side effects that you should report to your doctor or health care professional as soon as possible:  • allergic reactions like skin rash, itching or hives, swelling of the face, lips, or tongue  • breathing problems  • changes in vision  • confusion  • dry mouth  • feeling faint, lightheaded  • hallucination  • irregular heartbeat  • mouth pain, sores  • problems with  balance, talking, walking  • trouble passing urine or change in the amount of urine  • unusual bleeding or bruising  • unusually weak or tired  Side effects that usually do not require medical attention (report to your doctor or health care professional if they continue or are bothersome):  • dizzy  • headache  • loss of appetite  • nausea, vomiting  • sweating  • tingling in mouth  This list may not describe all possible side effects. Call your doctor for medical advice about side effects. You may report side effects to FDA at 7-630-LPL-0067.        I have been informed of and understand the above discharge instructions.

## 2020-02-05 ENCOUNTER — HOSPITAL ENCOUNTER (OUTPATIENT)
Dept: LAB | Facility: MEDICAL CENTER | Age: 60
End: 2020-02-05
Attending: PODIATRIST
Payer: COMMERCIAL

## 2020-02-05 LAB — URATE SERPL-MCNC: 4.8 MG/DL (ref 2.5–8.3)

## 2020-02-05 PROCEDURE — 36415 COLL VENOUS BLD VENIPUNCTURE: CPT

## 2020-02-05 PROCEDURE — 84550 ASSAY OF BLOOD/URIC ACID: CPT

## 2020-03-09 ENCOUNTER — APPOINTMENT (OUTPATIENT)
Dept: URGENT CARE | Facility: PHYSICIAN GROUP | Age: 60
End: 2020-03-09
Payer: COMMERCIAL

## 2020-03-09 ENCOUNTER — APPOINTMENT (OUTPATIENT)
Dept: RADIOLOGY | Facility: IMAGING CENTER | Age: 60
End: 2020-03-09
Attending: NEUROLOGICAL SURGERY
Payer: COMMERCIAL

## 2020-03-09 DIAGNOSIS — M54.40 ACUTE MIDLINE LOW BACK PAIN WITH SCIATICA, SCIATICA LATERALITY UNSPECIFIED: ICD-10-CM

## 2020-03-09 PROCEDURE — 72110 X-RAY EXAM L-2 SPINE 4/>VWS: CPT | Mod: TC,FY | Performed by: PHYSICIAN ASSISTANT

## 2020-05-18 ENCOUNTER — TELEPHONE (OUTPATIENT)
Dept: SCHEDULING | Facility: IMAGING CENTER | Age: 60
End: 2020-05-18

## 2020-06-01 ENCOUNTER — HOSPITAL ENCOUNTER (OUTPATIENT)
Dept: LAB | Facility: MEDICAL CENTER | Age: 60
End: 2020-06-01
Attending: PODIATRIST
Payer: COMMERCIAL

## 2020-06-01 LAB
BASOPHILS # BLD AUTO: 0.4 % (ref 0–1.8)
BASOPHILS # BLD: 0.03 K/UL (ref 0–0.12)
EOSINOPHIL # BLD AUTO: 0.06 K/UL (ref 0–0.51)
EOSINOPHIL NFR BLD: 0.8 % (ref 0–6.9)
ERYTHROCYTE [DISTWIDTH] IN BLOOD BY AUTOMATED COUNT: 42.4 FL (ref 35.9–50)
HCT VFR BLD AUTO: 44.4 % (ref 42–52)
HGB BLD-MCNC: 13.9 G/DL (ref 14–18)
HGB RETIC QN AUTO: 33.5 PG/CELL (ref 29–35)
IMM GRANULOCYTES # BLD AUTO: 0.03 K/UL (ref 0–0.11)
IMM GRANULOCYTES NFR BLD AUTO: 0.4 % (ref 0–0.9)
IMM RETICS NFR: 10.4 % (ref 9.3–17.4)
LYMPHOCYTES # BLD AUTO: 2.81 K/UL (ref 1–4.8)
LYMPHOCYTES NFR BLD: 37.3 % (ref 22–41)
MCH RBC QN AUTO: 28.2 PG (ref 27–33)
MCHC RBC AUTO-ENTMCNC: 31.3 G/DL (ref 33.7–35.3)
MCV RBC AUTO: 90.1 FL (ref 81.4–97.8)
MONOCYTES # BLD AUTO: 0.71 K/UL (ref 0–0.85)
MONOCYTES NFR BLD AUTO: 9.4 % (ref 0–13.4)
NEUTROPHILS # BLD AUTO: 3.89 K/UL (ref 1.82–7.42)
NEUTROPHILS NFR BLD: 51.7 % (ref 44–72)
NRBC # BLD AUTO: 0 K/UL
NRBC BLD-RTO: 0 /100 WBC
PLATELET # BLD AUTO: 229 K/UL (ref 164–446)
PMV BLD AUTO: 11.4 FL (ref 9–12.9)
RBC # BLD AUTO: 4.93 M/UL (ref 4.7–6.1)
RETICS # AUTO: 0.08 M/UL (ref 0.04–0.06)
RETICS/RBC NFR: 1.7 % (ref 0.8–2.1)
WBC # BLD AUTO: 7.5 K/UL (ref 4.8–10.8)

## 2020-06-01 PROCEDURE — 85025 COMPLETE CBC W/AUTO DIFF WBC: CPT

## 2020-06-01 PROCEDURE — 36415 COLL VENOUS BLD VENIPUNCTURE: CPT

## 2020-06-01 PROCEDURE — 85046 RETICYTE/HGB CONCENTRATE: CPT

## 2020-06-01 PROCEDURE — 82955 ASSAY OF G6PD ENZYME: CPT

## 2020-06-02 ENCOUNTER — HOSPITAL ENCOUNTER (OUTPATIENT)
Dept: RADIOLOGY | Facility: MEDICAL CENTER | Age: 60
End: 2020-06-02
Attending: NEUROLOGICAL SURGERY | Admitting: NEUROLOGICAL SURGERY
Payer: COMMERCIAL

## 2020-06-02 DIAGNOSIS — Z01.811 PRE-OPERATIVE RESPIRATORY EXAMINATION: ICD-10-CM

## 2020-06-02 DIAGNOSIS — Z01.812 PRE-OPERATIVE LABORATORY EXAMINATION: ICD-10-CM

## 2020-06-02 DIAGNOSIS — Z01.810 PRE-OPERATIVE CARDIOVASCULAR EXAMINATION: ICD-10-CM

## 2020-06-02 LAB
25(OH)D3 SERPL-MCNC: 22 NG/ML (ref 30–100)
ANION GAP SERPL CALC-SCNC: 11 MMOL/L (ref 7–16)
APPEARANCE UR: CLEAR
APTT PPP: 30.5 SEC (ref 24.7–36)
BACTERIA #/AREA URNS HPF: NEGATIVE /HPF
BILIRUB UR QL STRIP.AUTO: NEGATIVE
BUN SERPL-MCNC: 16 MG/DL (ref 8–22)
CALCIUM SERPL-MCNC: 8.9 MG/DL (ref 8.5–10.5)
CHLORIDE SERPL-SCNC: 105 MMOL/L (ref 96–112)
CO2 SERPL-SCNC: 26 MMOL/L (ref 20–33)
COLOR UR: YELLOW
CREAT SERPL-MCNC: 1.14 MG/DL (ref 0.5–1.4)
EKG IMPRESSION: NORMAL
EPI CELLS #/AREA URNS HPF: NEGATIVE /HPF
GLUCOSE SERPL-MCNC: 98 MG/DL (ref 65–99)
GLUCOSE UR STRIP.AUTO-MCNC: NEGATIVE MG/DL
HYALINE CASTS #/AREA URNS LPF: ABNORMAL /LPF
INR PPP: 0.97 (ref 0.87–1.13)
KETONES UR STRIP.AUTO-MCNC: ABNORMAL MG/DL
LEUKOCYTE ESTERASE UR QL STRIP.AUTO: ABNORMAL
MICRO URNS: ABNORMAL
NITRITE UR QL STRIP.AUTO: NEGATIVE
PH UR STRIP.AUTO: 5 [PH] (ref 5–8)
POTASSIUM SERPL-SCNC: 4.1 MMOL/L (ref 3.6–5.5)
PROT UR QL STRIP: NEGATIVE MG/DL
PROTHROMBIN TIME: 13.1 SEC (ref 12–14.6)
RBC # URNS HPF: ABNORMAL /HPF
RBC UR QL AUTO: NEGATIVE
SODIUM SERPL-SCNC: 142 MMOL/L (ref 135–145)
SP GR UR STRIP.AUTO: 1.02
UROBILINOGEN UR STRIP.AUTO-MCNC: 0.2 MG/DL
WBC #/AREA URNS HPF: ABNORMAL /HPF

## 2020-06-02 PROCEDURE — 71045 X-RAY EXAM CHEST 1 VIEW: CPT

## 2020-06-02 PROCEDURE — 85730 THROMBOPLASTIN TIME PARTIAL: CPT

## 2020-06-02 PROCEDURE — 85610 PROTHROMBIN TIME: CPT

## 2020-06-02 PROCEDURE — 82306 VITAMIN D 25 HYDROXY: CPT

## 2020-06-02 PROCEDURE — 80048 BASIC METABOLIC PNL TOTAL CA: CPT

## 2020-06-02 PROCEDURE — 36415 COLL VENOUS BLD VENIPUNCTURE: CPT

## 2020-06-02 PROCEDURE — 93005 ELECTROCARDIOGRAM TRACING: CPT

## 2020-06-02 PROCEDURE — 87086 URINE CULTURE/COLONY COUNT: CPT

## 2020-06-02 PROCEDURE — 93010 ELECTROCARDIOGRAM REPORT: CPT | Performed by: INTERNAL MEDICINE

## 2020-06-02 PROCEDURE — 81001 URINALYSIS AUTO W/SCOPE: CPT

## 2020-06-02 RX ORDER — GABAPENTIN 300 MG/1
900 CAPSULE ORAL 2 TIMES DAILY
COMMUNITY
End: 2024-02-17

## 2020-06-02 RX ORDER — NAPROXEN AND ESOMEPRAZOLE MAGNESIUM 500; 20 MG/1; MG/1
1 TABLET, DELAYED RELEASE ORAL EVERY MORNING
Status: ON HOLD | COMMUNITY
End: 2020-06-12

## 2020-06-02 RX ORDER — DIPHENHYDRAMINE HYDROCHLORIDE 25 MG/1
1 CAPSULE ORAL DAILY
COMMUNITY
End: 2024-02-17

## 2020-06-02 RX ORDER — ALLOPURINOL 300 MG/1
300 TABLET ORAL DAILY
COMMUNITY

## 2020-06-02 RX ORDER — VITAMIN E 268 MG
400 CAPSULE ORAL DAILY
Status: ON HOLD | COMMUNITY
End: 2020-06-12

## 2020-06-02 ASSESSMENT — FIBROSIS 4 INDEX: FIB4 SCORE: 1.32

## 2020-06-04 LAB
BACTERIA UR CULT: NORMAL
SIGNIFICANT IND 70042: NORMAL
SITE SITE: NORMAL
SOURCE SOURCE: NORMAL

## 2020-06-05 LAB — G6PD RBC-CCNC: 1.4 U/G HB (ref 9.9–16.6)

## 2020-06-06 ENCOUNTER — OFFICE VISIT (OUTPATIENT)
Dept: ADMISSIONS | Facility: MEDICAL CENTER | Age: 60
End: 2020-06-06
Attending: NEUROLOGICAL SURGERY
Payer: COMMERCIAL

## 2020-06-06 DIAGNOSIS — Z01.812 PRE-OPERATIVE LABORATORY EXAMINATION: ICD-10-CM

## 2020-06-06 LAB — COVID ORDER STATUS COVID19: NORMAL

## 2020-06-06 PROCEDURE — C9803 HOPD COVID-19 SPEC COLLECT: HCPCS

## 2020-06-08 LAB
SARS-COV-2 RNA RESP QL NAA+PROBE: NOT DETECTED
SPECIMEN SOURCE: NORMAL

## 2020-06-10 ENCOUNTER — HOSPITAL ENCOUNTER (OUTPATIENT)
Facility: MEDICAL CENTER | Age: 60
End: 2020-06-12
Attending: NEUROLOGICAL SURGERY | Admitting: NEUROLOGICAL SURGERY
Payer: COMMERCIAL

## 2020-06-10 ENCOUNTER — ANESTHESIA EVENT (OUTPATIENT)
Dept: SURGERY | Facility: MEDICAL CENTER | Age: 60
End: 2020-06-10
Payer: COMMERCIAL

## 2020-06-10 ENCOUNTER — ANESTHESIA (OUTPATIENT)
Dept: SURGERY | Facility: MEDICAL CENTER | Age: 60
End: 2020-06-10
Payer: COMMERCIAL

## 2020-06-10 ENCOUNTER — APPOINTMENT (OUTPATIENT)
Dept: RADIOLOGY | Facility: MEDICAL CENTER | Age: 60
End: 2020-06-10
Attending: NEUROLOGICAL SURGERY
Payer: COMMERCIAL

## 2020-06-10 DIAGNOSIS — G89.18 POSTOPERATIVE PAIN: ICD-10-CM

## 2020-06-10 PROCEDURE — A9270 NON-COVERED ITEM OR SERVICE: HCPCS | Performed by: PHYSICIAN ASSISTANT

## 2020-06-10 PROCEDURE — 700105 HCHG RX REV CODE 258

## 2020-06-10 PROCEDURE — 160009 HCHG ANES TIME/MIN: Performed by: NEUROLOGICAL SURGERY

## 2020-06-10 PROCEDURE — 160035 HCHG PACU - 1ST 60 MINS PHASE I: Performed by: NEUROLOGICAL SURGERY

## 2020-06-10 PROCEDURE — 700105 HCHG RX REV CODE 258: Performed by: NEUROLOGICAL SURGERY

## 2020-06-10 PROCEDURE — 700102 HCHG RX REV CODE 250 W/ 637 OVERRIDE(OP): Performed by: PHYSICIAN ASSISTANT

## 2020-06-10 PROCEDURE — 160036 HCHG PACU - EA ADDL 30 MINS PHASE I: Performed by: NEUROLOGICAL SURGERY

## 2020-06-10 PROCEDURE — 700105 HCHG RX REV CODE 258: Performed by: PHYSICIAN ASSISTANT

## 2020-06-10 PROCEDURE — 700111 HCHG RX REV CODE 636 W/ 250 OVERRIDE (IP): Performed by: ANESTHESIOLOGY

## 2020-06-10 PROCEDURE — 500331 HCHG COTTONOID, SURG PATTIE: Performed by: NEUROLOGICAL SURGERY

## 2020-06-10 PROCEDURE — 700111 HCHG RX REV CODE 636 W/ 250 OVERRIDE (IP): Performed by: NEUROLOGICAL SURGERY

## 2020-06-10 PROCEDURE — 700102 HCHG RX REV CODE 250 W/ 637 OVERRIDE(OP): Performed by: NEUROLOGICAL SURGERY

## 2020-06-10 PROCEDURE — 700101 HCHG RX REV CODE 250: Performed by: ANESTHESIOLOGY

## 2020-06-10 PROCEDURE — 700111 HCHG RX REV CODE 636 W/ 250 OVERRIDE (IP): Performed by: PHYSICIAN ASSISTANT

## 2020-06-10 PROCEDURE — A9270 NON-COVERED ITEM OR SERVICE: HCPCS | Performed by: NEUROLOGICAL SURGERY

## 2020-06-10 PROCEDURE — A9270 NON-COVERED ITEM OR SERVICE: HCPCS | Performed by: ANESTHESIOLOGY

## 2020-06-10 PROCEDURE — 501838 HCHG SUTURE GENERAL: Performed by: NEUROLOGICAL SURGERY

## 2020-06-10 PROCEDURE — 160041 HCHG SURGERY MINUTES - EA ADDL 1 MIN LEVEL 4: Performed by: NEUROLOGICAL SURGERY

## 2020-06-10 PROCEDURE — 110454 HCHG SHELL REV 250: Performed by: NEUROLOGICAL SURGERY

## 2020-06-10 PROCEDURE — 160048 HCHG OR STATISTICAL LEVEL 1-5: Performed by: NEUROLOGICAL SURGERY

## 2020-06-10 PROCEDURE — 700105 HCHG RX REV CODE 258: Performed by: ANESTHESIOLOGY

## 2020-06-10 PROCEDURE — 700102 HCHG RX REV CODE 250 W/ 637 OVERRIDE(OP): Performed by: ANESTHESIOLOGY

## 2020-06-10 PROCEDURE — 700112 HCHG RX REV CODE 229: Performed by: PHYSICIAN ASSISTANT

## 2020-06-10 PROCEDURE — G0378 HOSPITAL OBSERVATION PER HR: HCPCS

## 2020-06-10 PROCEDURE — A4306 DRUG DELIVERY SYSTEM <=50 ML: HCPCS | Performed by: NEUROLOGICAL SURGERY

## 2020-06-10 PROCEDURE — 500002 HCHG ADHESIVE, DERMABOND: Performed by: NEUROLOGICAL SURGERY

## 2020-06-10 PROCEDURE — 502708 HCHG CATHETER, ON-Q SILVER SKR: Performed by: NEUROLOGICAL SURGERY

## 2020-06-10 PROCEDURE — 160029 HCHG SURGERY MINUTES - 1ST 30 MINS LEVEL 4: Performed by: NEUROLOGICAL SURGERY

## 2020-06-10 PROCEDURE — 72020 X-RAY EXAM OF SPINE 1 VIEW: CPT

## 2020-06-10 PROCEDURE — 700106 HCHG RX REV CODE 271: Performed by: NEUROLOGICAL SURGERY

## 2020-06-10 PROCEDURE — 96365 THER/PROPH/DIAG IV INF INIT: CPT

## 2020-06-10 PROCEDURE — 700101 HCHG RX REV CODE 250: Performed by: NEUROLOGICAL SURGERY

## 2020-06-10 PROCEDURE — 160002 HCHG RECOVERY MINUTES (STAT): Performed by: NEUROLOGICAL SURGERY

## 2020-06-10 PROCEDURE — 96375 TX/PRO/DX INJ NEW DRUG ADDON: CPT

## 2020-06-10 RX ORDER — DEXAMETHASONE SODIUM PHOSPHATE 4 MG/ML
INJECTION, SOLUTION INTRA-ARTICULAR; INTRALESIONAL; INTRAMUSCULAR; INTRAVENOUS; SOFT TISSUE PRN
Status: DISCONTINUED | OUTPATIENT
Start: 2020-06-10 | End: 2020-06-10 | Stop reason: SURG

## 2020-06-10 RX ORDER — HYDRALAZINE HYDROCHLORIDE 20 MG/ML
5 INJECTION INTRAMUSCULAR; INTRAVENOUS
Status: DISCONTINUED | OUTPATIENT
Start: 2020-06-10 | End: 2020-06-10

## 2020-06-10 RX ORDER — ONDANSETRON 2 MG/ML
INJECTION INTRAMUSCULAR; INTRAVENOUS PRN
Status: DISCONTINUED | OUTPATIENT
Start: 2020-06-10 | End: 2020-06-10 | Stop reason: SURG

## 2020-06-10 RX ORDER — DIPHENHYDRAMINE HCL 25 MG
25 TABLET ORAL EVERY 6 HOURS PRN
Status: DISCONTINUED | OUTPATIENT
Start: 2020-06-10 | End: 2020-06-12 | Stop reason: HOSPADM

## 2020-06-10 RX ORDER — SODIUM CHLORIDE AND POTASSIUM CHLORIDE 150; 900 MG/100ML; MG/100ML
INJECTION, SOLUTION INTRAVENOUS CONTINUOUS
Status: DISCONTINUED | OUTPATIENT
Start: 2020-06-10 | End: 2020-06-12 | Stop reason: HOSPADM

## 2020-06-10 RX ORDER — MAGNESIUM HYDROXIDE 1200 MG/15ML
LIQUID ORAL
Status: COMPLETED | OUTPATIENT
Start: 2020-06-10 | End: 2020-06-10

## 2020-06-10 RX ORDER — VANCOMYCIN HYDROCHLORIDE 1 G/20ML
INJECTION, POWDER, LYOPHILIZED, FOR SOLUTION INTRAVENOUS
Status: COMPLETED | OUTPATIENT
Start: 2020-06-10 | End: 2020-06-10

## 2020-06-10 RX ORDER — HYDROMORPHONE HYDROCHLORIDE 1 MG/ML
0.4 INJECTION, SOLUTION INTRAMUSCULAR; INTRAVENOUS; SUBCUTANEOUS
Status: DISCONTINUED | OUTPATIENT
Start: 2020-06-10 | End: 2020-06-10 | Stop reason: HOSPADM

## 2020-06-10 RX ORDER — ONDANSETRON 4 MG/1
4 TABLET, ORALLY DISINTEGRATING ORAL EVERY 4 HOURS PRN
Status: DISCONTINUED | OUTPATIENT
Start: 2020-06-10 | End: 2020-06-12 | Stop reason: HOSPADM

## 2020-06-10 RX ORDER — MEPERIDINE HYDROCHLORIDE 25 MG/ML
12.5 INJECTION INTRAMUSCULAR; INTRAVENOUS; SUBCUTANEOUS
Status: DISCONTINUED | OUTPATIENT
Start: 2020-06-10 | End: 2020-06-10 | Stop reason: HOSPADM

## 2020-06-10 RX ORDER — SODIUM CHLORIDE, SODIUM LACTATE, POTASSIUM CHLORIDE, CALCIUM CHLORIDE 600; 310; 30; 20 MG/100ML; MG/100ML; MG/100ML; MG/100ML
INJECTION, SOLUTION INTRAVENOUS CONTINUOUS
Status: ACTIVE | OUTPATIENT
Start: 2020-06-10 | End: 2020-06-10

## 2020-06-10 RX ORDER — ONDANSETRON 2 MG/ML
4 INJECTION INTRAMUSCULAR; INTRAVENOUS
Status: COMPLETED | OUTPATIENT
Start: 2020-06-10 | End: 2020-06-10

## 2020-06-10 RX ORDER — AMOXICILLIN 250 MG
1 CAPSULE ORAL NIGHTLY
Status: DISCONTINUED | OUTPATIENT
Start: 2020-06-10 | End: 2020-06-12 | Stop reason: HOSPADM

## 2020-06-10 RX ORDER — SODIUM CHLORIDE 9 MG/ML
INJECTION, SOLUTION INTRAVENOUS
Status: COMPLETED
Start: 2020-06-10 | End: 2020-06-10

## 2020-06-10 RX ORDER — AMOXICILLIN 250 MG
1 CAPSULE ORAL
Status: DISCONTINUED | OUTPATIENT
Start: 2020-06-10 | End: 2020-06-12 | Stop reason: HOSPADM

## 2020-06-10 RX ORDER — PROMETHAZINE HYDROCHLORIDE 25 MG/1
12.5-25 TABLET ORAL EVERY 4 HOURS PRN
Status: DISCONTINUED | OUTPATIENT
Start: 2020-06-10 | End: 2020-06-12 | Stop reason: HOSPADM

## 2020-06-10 RX ORDER — DOCUSATE SODIUM 100 MG/1
100 CAPSULE, LIQUID FILLED ORAL 2 TIMES DAILY
Status: DISCONTINUED | OUTPATIENT
Start: 2020-06-10 | End: 2020-06-12 | Stop reason: HOSPADM

## 2020-06-10 RX ORDER — HYDROMORPHONE HYDROCHLORIDE 1 MG/ML
0.2 INJECTION, SOLUTION INTRAMUSCULAR; INTRAVENOUS; SUBCUTANEOUS
Status: DISCONTINUED | OUTPATIENT
Start: 2020-06-10 | End: 2020-06-10 | Stop reason: HOSPADM

## 2020-06-10 RX ORDER — SODIUM CHLORIDE, SODIUM LACTATE, POTASSIUM CHLORIDE, CALCIUM CHLORIDE 600; 310; 30; 20 MG/100ML; MG/100ML; MG/100ML; MG/100ML
INJECTION, SOLUTION INTRAVENOUS CONTINUOUS
Status: DISCONTINUED | OUTPATIENT
Start: 2020-06-10 | End: 2020-06-10 | Stop reason: HOSPADM

## 2020-06-10 RX ORDER — PROMETHAZINE HYDROCHLORIDE 12.5 MG/1
12.5-25 SUPPOSITORY RECTAL EVERY 4 HOURS PRN
Status: DISCONTINUED | OUTPATIENT
Start: 2020-06-10 | End: 2020-06-12 | Stop reason: HOSPADM

## 2020-06-10 RX ORDER — ONDANSETRON 2 MG/ML
4 INJECTION INTRAMUSCULAR; INTRAVENOUS EVERY 4 HOURS PRN
Status: DISCONTINUED | OUTPATIENT
Start: 2020-06-10 | End: 2020-06-12 | Stop reason: HOSPADM

## 2020-06-10 RX ORDER — CEFAZOLIN SODIUM 1 G/3ML
INJECTION, POWDER, FOR SOLUTION INTRAMUSCULAR; INTRAVENOUS PRN
Status: DISCONTINUED | OUTPATIENT
Start: 2020-06-10 | End: 2020-06-10 | Stop reason: SURG

## 2020-06-10 RX ORDER — POLYETHYLENE GLYCOL 3350 17 G/17G
1 POWDER, FOR SOLUTION ORAL 2 TIMES DAILY PRN
Status: DISCONTINUED | OUTPATIENT
Start: 2020-06-10 | End: 2020-06-12 | Stop reason: HOSPADM

## 2020-06-10 RX ORDER — METHOCARBAMOL 750 MG/1
750 TABLET, FILM COATED ORAL EVERY 8 HOURS PRN
Status: DISCONTINUED | OUTPATIENT
Start: 2020-06-10 | End: 2020-06-12 | Stop reason: HOSPADM

## 2020-06-10 RX ORDER — MAGNESIUM SULFATE HEPTAHYDRATE 500 MG/ML
INJECTION, SOLUTION INTRAMUSCULAR; INTRAVENOUS PRN
Status: DISCONTINUED | OUTPATIENT
Start: 2020-06-10 | End: 2020-06-10 | Stop reason: SURG

## 2020-06-10 RX ORDER — DIPHENHYDRAMINE HYDROCHLORIDE 50 MG/ML
25 INJECTION INTRAMUSCULAR; INTRAVENOUS EVERY 6 HOURS PRN
Status: DISCONTINUED | OUTPATIENT
Start: 2020-06-10 | End: 2020-06-12 | Stop reason: HOSPADM

## 2020-06-10 RX ORDER — CEFAZOLIN SODIUM 2 G/100ML
2 INJECTION, SOLUTION INTRAVENOUS EVERY 8 HOURS
Status: COMPLETED | OUTPATIENT
Start: 2020-06-10 | End: 2020-06-11

## 2020-06-10 RX ORDER — OXYCODONE HCL 5 MG/5 ML
10 SOLUTION, ORAL ORAL
Status: COMPLETED | OUTPATIENT
Start: 2020-06-10 | End: 2020-06-10

## 2020-06-10 RX ORDER — BISACODYL 10 MG
10 SUPPOSITORY, RECTAL RECTAL
Status: DISCONTINUED | OUTPATIENT
Start: 2020-06-10 | End: 2020-06-12 | Stop reason: HOSPADM

## 2020-06-10 RX ORDER — DIPHENHYDRAMINE HYDROCHLORIDE 50 MG/ML
6.25 INJECTION INTRAMUSCULAR; INTRAVENOUS
Status: DISCONTINUED | OUTPATIENT
Start: 2020-06-10 | End: 2020-06-10 | Stop reason: HOSPADM

## 2020-06-10 RX ORDER — SODIUM CHLORIDE, SODIUM LACTATE, POTASSIUM CHLORIDE, AND CALCIUM CHLORIDE .6; .31; .03; .02 G/100ML; G/100ML; G/100ML; G/100ML
IRRIGANT IRRIGATION
Status: DISCONTINUED | OUTPATIENT
Start: 2020-06-10 | End: 2020-06-10 | Stop reason: HOSPADM

## 2020-06-10 RX ORDER — PROCHLORPERAZINE EDISYLATE 5 MG/ML
5-10 INJECTION INTRAMUSCULAR; INTRAVENOUS EVERY 4 HOURS PRN
Status: DISCONTINUED | OUTPATIENT
Start: 2020-06-10 | End: 2020-06-12 | Stop reason: HOSPADM

## 2020-06-10 RX ORDER — HYDROMORPHONE HYDROCHLORIDE 1 MG/ML
0.1 INJECTION, SOLUTION INTRAMUSCULAR; INTRAVENOUS; SUBCUTANEOUS
Status: DISCONTINUED | OUTPATIENT
Start: 2020-06-10 | End: 2020-06-10 | Stop reason: HOSPADM

## 2020-06-10 RX ORDER — HALOPERIDOL 5 MG/ML
1 INJECTION INTRAMUSCULAR
Status: DISCONTINUED | OUTPATIENT
Start: 2020-06-10 | End: 2020-06-10 | Stop reason: HOSPADM

## 2020-06-10 RX ORDER — ENEMA 19; 7 G/133ML; G/133ML
1 ENEMA RECTAL
Status: DISCONTINUED | OUTPATIENT
Start: 2020-06-10 | End: 2020-06-12 | Stop reason: HOSPADM

## 2020-06-10 RX ORDER — OXYCODONE HCL 5 MG/5 ML
5 SOLUTION, ORAL ORAL
Status: COMPLETED | OUTPATIENT
Start: 2020-06-10 | End: 2020-06-10

## 2020-06-10 RX ORDER — HYDRALAZINE HYDROCHLORIDE 20 MG/ML
10 INJECTION INTRAMUSCULAR; INTRAVENOUS
Status: DISCONTINUED | OUTPATIENT
Start: 2020-06-10 | End: 2020-06-12 | Stop reason: HOSPADM

## 2020-06-10 RX ORDER — BUPIVACAINE HYDROCHLORIDE AND EPINEPHRINE 5; 5 MG/ML; UG/ML
INJECTION, SOLUTION EPIDURAL; INTRACAUDAL; PERINEURAL
Status: DISCONTINUED | OUTPATIENT
Start: 2020-06-10 | End: 2020-06-10 | Stop reason: HOSPADM

## 2020-06-10 RX ORDER — VITAMIN B COMPLEX
5000 TABLET ORAL DAILY
Status: DISCONTINUED | OUTPATIENT
Start: 2020-06-11 | End: 2020-06-12 | Stop reason: HOSPADM

## 2020-06-10 RX ORDER — PHENYLEPHRINE HCL IN 0.9% NACL 0.5 MG/5ML
SYRINGE (ML) INTRAVENOUS PRN
Status: DISCONTINUED | OUTPATIENT
Start: 2020-06-10 | End: 2020-06-10 | Stop reason: SURG

## 2020-06-10 RX ORDER — ROCURONIUM BROMIDE 10 MG/ML
INJECTION, SOLUTION INTRAVENOUS PRN
Status: DISCONTINUED | OUTPATIENT
Start: 2020-06-10 | End: 2020-06-10 | Stop reason: SURG

## 2020-06-10 RX ORDER — GABAPENTIN 300 MG/1
900 CAPSULE ORAL 2 TIMES DAILY
Status: DISCONTINUED | OUTPATIENT
Start: 2020-06-10 | End: 2020-06-12 | Stop reason: HOSPADM

## 2020-06-10 RX ORDER — ALLOPURINOL 300 MG/1
300 TABLET ORAL DAILY
Status: DISCONTINUED | OUTPATIENT
Start: 2020-06-11 | End: 2020-06-12 | Stop reason: HOSPADM

## 2020-06-10 RX ADMIN — SODIUM CHLORIDE, POTASSIUM CHLORIDE, SODIUM LACTATE AND CALCIUM CHLORIDE: 600; 310; 30; 20 INJECTION, SOLUTION INTRAVENOUS at 15:58

## 2020-06-10 RX ADMIN — SODIUM CHLORIDE, POTASSIUM CHLORIDE, SODIUM LACTATE AND CALCIUM CHLORIDE: 600; 310; 30; 20 INJECTION, SOLUTION INTRAVENOUS at 12:15

## 2020-06-10 RX ADMIN — Medication 100 MCG: at 13:32

## 2020-06-10 RX ADMIN — MORPHINE SULFATE: 50 INJECTION, SOLUTION, CONCENTRATE INTRAVENOUS at 19:45

## 2020-06-10 RX ADMIN — EPHEDRINE SULFATE 10 MG: 50 INJECTION, SOLUTION INTRAVENOUS at 13:11

## 2020-06-10 RX ADMIN — SODIUM CHLORIDE, POTASSIUM CHLORIDE, SODIUM LACTATE AND CALCIUM CHLORIDE: 600; 310; 30; 20 INJECTION, SOLUTION INTRAVENOUS at 13:33

## 2020-06-10 RX ADMIN — ROCURONIUM BROMIDE 60 MG: 10 INJECTION, SOLUTION INTRAVENOUS at 12:57

## 2020-06-10 RX ADMIN — FENTANYL CITRATE 50 MCG: 50 INJECTION INTRAMUSCULAR; INTRAVENOUS at 15:57

## 2020-06-10 RX ADMIN — DEXAMETHASONE SODIUM PHOSPHATE 8 MG: 4 INJECTION, SOLUTION INTRA-ARTICULAR; INTRALESIONAL; INTRAMUSCULAR; INTRAVENOUS; SOFT TISSUE at 13:01

## 2020-06-10 RX ADMIN — SODIUM CHLORIDE, POTASSIUM CHLORIDE, SODIUM LACTATE AND CALCIUM CHLORIDE: 600; 310; 30; 20 INJECTION, SOLUTION INTRAVENOUS at 15:14

## 2020-06-10 RX ADMIN — LIDOCAINE HYDROCHLORIDE 0.5 ML: 10 INJECTION, SOLUTION EPIDURAL; INFILTRATION; INTRACAUDAL at 11:58

## 2020-06-10 RX ADMIN — FENTANYL CITRATE 100 MCG: 50 INJECTION INTRAMUSCULAR; INTRAVENOUS at 15:07

## 2020-06-10 RX ADMIN — Medication 100 MCG: at 13:47

## 2020-06-10 RX ADMIN — FENTANYL CITRATE 50 MCG: 50 INJECTION INTRAMUSCULAR; INTRAVENOUS at 14:24

## 2020-06-10 RX ADMIN — CEFAZOLIN 3 G: 330 INJECTION, POWDER, FOR SOLUTION INTRAMUSCULAR; INTRAVENOUS at 12:57

## 2020-06-10 RX ADMIN — FENTANYL CITRATE 150 MCG: 50 INJECTION INTRAMUSCULAR; INTRAVENOUS at 12:57

## 2020-06-10 RX ADMIN — SUGAMMADEX 200 MG: 100 INJECTION, SOLUTION INTRAVENOUS at 15:15

## 2020-06-10 RX ADMIN — Medication 100 MCG: at 13:12

## 2020-06-10 RX ADMIN — EPHEDRINE SULFATE 10 MG: 50 INJECTION, SOLUTION INTRAVENOUS at 13:06

## 2020-06-10 RX ADMIN — ONDANSETRON 4 MG: 2 INJECTION INTRAMUSCULAR; INTRAVENOUS at 14:56

## 2020-06-10 RX ADMIN — FENTANYL CITRATE 50 MCG: 50 INJECTION INTRAMUSCULAR; INTRAVENOUS at 16:25

## 2020-06-10 RX ADMIN — DOCUSATE SODIUM 100 MG: 100 CAPSULE, LIQUID FILLED ORAL at 19:46

## 2020-06-10 RX ADMIN — SODIUM CHLORIDE 500 ML: 9 INJECTION, SOLUTION INTRAVENOUS at 19:44

## 2020-06-10 RX ADMIN — Medication 100 MCG: at 13:38

## 2020-06-10 RX ADMIN — OXYCODONE HYDROCHLORIDE 10 MG: 5 SOLUTION ORAL at 15:54

## 2020-06-10 RX ADMIN — DOCUSATE SODIUM 50 MG AND SENNOSIDES 8.6 MG 1 TABLET: 8.6; 5 TABLET, FILM COATED ORAL at 19:46

## 2020-06-10 RX ADMIN — HYDROMORPHONE HYDROCHLORIDE 0.2 MG: 1 INJECTION, SOLUTION INTRAMUSCULAR; INTRAVENOUS; SUBCUTANEOUS at 16:50

## 2020-06-10 RX ADMIN — POVIDONE-IODINE 15 ML: 10 SOLUTION TOPICAL at 11:58

## 2020-06-10 RX ADMIN — Medication 100 MCG: at 13:14

## 2020-06-10 RX ADMIN — MAGNESIUM SULFATE HEPTAHYDRATE 4 G: 500 INJECTION, SOLUTION INTRAMUSCULAR; INTRAVENOUS at 13:01

## 2020-06-10 RX ADMIN — CEFAZOLIN SODIUM 2 G: 2 INJECTION, SOLUTION INTRAVENOUS at 19:45

## 2020-06-10 RX ADMIN — PROPOFOL 200 MG: 10 INJECTION, EMULSION INTRAVENOUS at 12:57

## 2020-06-10 RX ADMIN — ONDANSETRON 4 MG: 2 INJECTION INTRAMUSCULAR; INTRAVENOUS at 15:48

## 2020-06-10 RX ADMIN — EPHEDRINE SULFATE 10 MG: 50 INJECTION, SOLUTION INTRAVENOUS at 13:08

## 2020-06-10 RX ADMIN — GABAPENTIN 900 MG: 300 CAPSULE ORAL at 19:46

## 2020-06-10 ASSESSMENT — FIBROSIS 4 INDEX: FIB4 SCORE: 1.32

## 2020-06-10 NOTE — ANESTHESIA PROCEDURE NOTES
Airway    Date/Time: 6/10/2020 12:58 PM  Performed by: Devin Alba M.D.  Authorized by: Devin Alba M.D.     Location:  OR  Urgency:  Elective  Difficult Airway: No    Indications for Airway Management:  Anesthesia      Spontaneous Ventilation: absent    Sedation Level:  Deep  Preoxygenated: Yes    Patient Position:  Sniffing  Mask Difficulty Assessment:  1 - vent by mask  Final Airway Type:  Endotracheal airway  Final Endotracheal Airway:  ETT  Cuffed: Yes    Technique Used for Successful ETT Placement:  Direct laryngoscopy    Insertion Site:  Oral  Blade Type:  Frances  Laryngoscope Blade/Videolaryngoscope Blade Size:  3  ETT Size (mm):  7.5  Measured from:  Teeth  ETT to Teeth (cm):  23  Placement Verified by: auscultation and capnometry    Cormack-Lehane Classification:  Grade IIa - partial view of glottis  Number of Attempts at Approach:  1

## 2020-06-10 NOTE — ANESTHESIA TIME REPORT
Anesthesia Start and Stop Event Times     Date Time Event    6/10/2020 1246 Ready for Procedure     1254 Anesthesia Start     1532 Anesthesia Stop        Responsible Staff  06/10/20    Name Role Begin End    Devin Alba M.D. Anesth 1254 1532        Preop Diagnosis (Free Text):  Pre-op Diagnosis     SPINAL STENOSIS OF LUMBAR REGION        Preop Diagnosis (Codes):    Post op Diagnosis  Lumbar stenosis      Premium Reason  A. 3PM - 7AM    Comments:

## 2020-06-10 NOTE — ANESTHESIA PREPROCEDURE EVALUATION
Past Medical History:   Diagnosis Date   • Arthritis     knees- OA generalized   • Bowel habit changes 03/26/2019    constipation   • G6PD deficiency    • Gout    • Hard of hearing    • History of anemia    • Kidney stones    • Pain 06/02/2020    back,knees, pain scale 3-4/10   • Sleep apnea     didn't tolerate CPAP   • Snoring    + G6PD deficiency      Relevant Problems   CARDIAC   (+) HTN (hypertension)       Physical Exam    Airway   Mallampati: III  TM distance: >3 FB  Neck ROM: full       Cardiovascular - normal exam  Rhythm: regular  Rate: normal  (-) murmur     Dental - normal exam           Pulmonary - normal exam  Breath sounds clear to auscultation     Abdominal    Neurological - normal exam                 Anesthesia Plan    ASA 2       Plan - general       Airway plan will be ETT        Induction: intravenous    Postoperative Plan: Postoperative administration of opioids is intended.    Pertinent diagnostic labs and testing reviewed    Informed Consent:    Anesthetic plan and risks discussed with patient.    Use of blood products discussed with: patient whom consented to blood products.

## 2020-06-10 NOTE — ANESTHESIA POSTPROCEDURE EVALUATION
Patient: Ra Smith    Procedure Summary     Date:  06/10/20 Room / Location:  [facility] OR 07 / SURGERY [facility]    Anesthesia Start:  1254 Anesthesia Stop:  1532    Procedures:       LAMINECTOMY, SPINE, LUMBAR, WITH DISCECTOMY-L2-3 AND RIGHT L3-5 DECOMPRESSIVE LAMI (Spine Lumbar)      FORAMINOTOMY, SPINE (Bilateral Spine Lumbar) Diagnosis:  (SPINAL STENOSIS OF LUMBAR REGION)    Surgeon:  Alexandra Esposito M.D. Responsible Provider:  Devin Alba M.D.    Anesthesia Type:  general ASA Status:  2          Final Anesthesia Type: general  Last vitals  BP   Blood Pressure: 143/98    Temp   36.6 °C (97.8 °F)    Pulse   Pulse: 81   Resp   18    SpO2   95 %      Anesthesia Post Evaluation    Patient location during evaluation: PACU  Patient participation: complete - patient participated  Level of consciousness: sleepy but conscious    Airway patency: patent  Anesthetic complications: no  Cardiovascular status: hemodynamically stable  Respiratory status: acceptable  Hydration status: euvolemic    PONV: none           Nurse Pain Score: 4 (NPRS)

## 2020-06-10 NOTE — ANESTHESIA QCDR
2019 [facility] Clinical Data Registry (for Quality Improvement)     Postoperative nausea/vomiting risk protocol (Adult = 18 yrs and Pediatric 3-17 yrs)- (430 and 463)  General inhalation anesthetic (NOT TIVA) with PONV risk factors: Yes  Provision of anti-emetic therapy with at least 2 different classes of agents: Yes   Patient DID NOT receive anti-emetic therapy and reason is documented in Medical Record:  N/A    Multimodal Pain Management- (477)  Non-emergent surgery AND patient age >= 18: Yes  Use of Multimodal Pain Management, two or more drugs and/or interventions, NOT including systemic opioids: Yes  Exception: Documented allergy to multiple classes of analgesics: N/A    Smoking Abstinence (404)  Patient is current smoker (cigarette, pipe, e-cig, marijuanna): Yes  Elective Surgery: Yes  Abstinence instructions provided prior to day of surgery: Yes  Patient abstained from smoking on day of surgery: Yes    Pre-Op Beta-Blocker in Isolated CABG (44)  Isolated CABG AND patient age >= 18: No  Beta-blocker admin within 24 hours of surgical incision:   Exception:of medical reason(s) for not administering beta blocker within 24 hours prior to surgical incision (e.g., not  indicated,other medical reason):     PACU assessment of acute postoperative pain prior to Anesthesia Care End- Applies to Patients Age = 18- (ABG7)  Initial PACU pain score is which of the following: < 7/10  Patient unable to report pain score: N/A    Post-anesthetic transfer of care checklist/protocol to PACU/ICU- (426 and 427)  Upon conclusion of case, patient transferred to which of the following locations: PACU/Non-ICU  Use of transfer checklist/protocol: Yes  Exclusion: Service Performed in Patient Hospital Room (and thus did not require transfer): N/A  Unplanned admission to ICU related to anesthesia service up through end of PACU care- (MD51)  Unplanned admission to ICU (not initially anticipated at anesthesia start time): No

## 2020-06-11 LAB
ANION GAP SERPL CALC-SCNC: 6 MMOL/L (ref 7–16)
BUN SERPL-MCNC: 14 MG/DL (ref 8–22)
CALCIUM SERPL-MCNC: 8 MG/DL (ref 8.5–10.5)
CHLORIDE SERPL-SCNC: 104 MMOL/L (ref 96–112)
CO2 SERPL-SCNC: 25 MMOL/L (ref 20–33)
CREAT SERPL-MCNC: 1.24 MG/DL (ref 0.5–1.4)
ERYTHROCYTE [DISTWIDTH] IN BLOOD BY AUTOMATED COUNT: 42.9 FL (ref 35.9–50)
GLUCOSE SERPL-MCNC: 164 MG/DL (ref 65–99)
HCT VFR BLD AUTO: 38.9 % (ref 42–52)
HGB BLD-MCNC: 12.2 G/DL (ref 14–18)
MCH RBC QN AUTO: 28.6 PG (ref 27–33)
MCHC RBC AUTO-ENTMCNC: 31.4 G/DL (ref 33.7–35.3)
MCV RBC AUTO: 91.3 FL (ref 81.4–97.8)
PLATELET # BLD AUTO: 253 K/UL (ref 164–446)
PMV BLD AUTO: 9.9 FL (ref 9–12.9)
POTASSIUM SERPL-SCNC: 5.1 MMOL/L (ref 3.6–5.5)
RBC # BLD AUTO: 4.26 M/UL (ref 4.7–6.1)
SODIUM SERPL-SCNC: 135 MMOL/L (ref 135–145)
WBC # BLD AUTO: 10.8 K/UL (ref 4.8–10.8)

## 2020-06-11 PROCEDURE — 97165 OT EVAL LOW COMPLEX 30 MIN: CPT

## 2020-06-11 PROCEDURE — A9270 NON-COVERED ITEM OR SERVICE: HCPCS | Performed by: PHYSICIAN ASSISTANT

## 2020-06-11 PROCEDURE — 700101 HCHG RX REV CODE 250: Performed by: PHYSICIAN ASSISTANT

## 2020-06-11 PROCEDURE — 700102 HCHG RX REV CODE 250 W/ 637 OVERRIDE(OP): Performed by: PHYSICIAN ASSISTANT

## 2020-06-11 PROCEDURE — G0378 HOSPITAL OBSERVATION PER HR: HCPCS

## 2020-06-11 PROCEDURE — 97161 PT EVAL LOW COMPLEX 20 MIN: CPT

## 2020-06-11 PROCEDURE — 96376 TX/PRO/DX INJ SAME DRUG ADON: CPT

## 2020-06-11 PROCEDURE — 80048 BASIC METABOLIC PNL TOTAL CA: CPT

## 2020-06-11 PROCEDURE — 36415 COLL VENOUS BLD VENIPUNCTURE: CPT

## 2020-06-11 PROCEDURE — 700111 HCHG RX REV CODE 636 W/ 250 OVERRIDE (IP): Performed by: PHYSICIAN ASSISTANT

## 2020-06-11 PROCEDURE — 85027 COMPLETE CBC AUTOMATED: CPT

## 2020-06-11 PROCEDURE — 700112 HCHG RX REV CODE 229: Performed by: PHYSICIAN ASSISTANT

## 2020-06-11 RX ORDER — OXYCODONE HYDROCHLORIDE AND ACETAMINOPHEN 5; 325 MG/1; MG/1
1-2 TABLET ORAL EVERY 4 HOURS PRN
Status: DISCONTINUED | OUTPATIENT
Start: 2020-06-11 | End: 2020-06-12 | Stop reason: HOSPADM

## 2020-06-11 RX ORDER — QUININE SULFATE 324 MG/1
324 CAPSULE ORAL
Status: DISCONTINUED | OUTPATIENT
Start: 2020-06-11 | End: 2020-06-12 | Stop reason: HOSPADM

## 2020-06-11 RX ADMIN — DOCUSATE SODIUM 50 MG AND SENNOSIDES 8.6 MG 1 TABLET: 8.6; 5 TABLET, FILM COATED ORAL at 22:11

## 2020-06-11 RX ADMIN — OXYCODONE HYDROCHLORIDE AND ACETAMINOPHEN 1 TABLET: 5; 325 TABLET ORAL at 17:04

## 2020-06-11 RX ADMIN — DOCUSATE SODIUM 100 MG: 100 CAPSULE, LIQUID FILLED ORAL at 05:16

## 2020-06-11 RX ADMIN — OXYCODONE HYDROCHLORIDE AND ACETAMINOPHEN 2 TABLET: 5; 325 TABLET ORAL at 11:04

## 2020-06-11 RX ADMIN — MELATONIN 5000 UNITS: at 05:16

## 2020-06-11 RX ADMIN — GABAPENTIN 900 MG: 300 CAPSULE ORAL at 05:16

## 2020-06-11 RX ADMIN — ALLOPURINOL 300 MG: 300 TABLET ORAL at 05:16

## 2020-06-11 RX ADMIN — GABAPENTIN 900 MG: 300 CAPSULE ORAL at 17:04

## 2020-06-11 RX ADMIN — CEFAZOLIN SODIUM 2 G: 2 INJECTION, SOLUTION INTRAVENOUS at 05:16

## 2020-06-11 RX ADMIN — METHOCARBAMOL TABLETS 750 MG: 750 TABLET, COATED ORAL at 11:04

## 2020-06-11 RX ADMIN — DOCUSATE SODIUM 100 MG: 100 CAPSULE, LIQUID FILLED ORAL at 17:04

## 2020-06-11 RX ADMIN — OXYCODONE HYDROCHLORIDE AND ACETAMINOPHEN 2 TABLET: 5; 325 TABLET ORAL at 22:11

## 2020-06-11 RX ADMIN — QUININE SULFATE 324 MG: 324 CAPSULE ORAL at 17:04

## 2020-06-11 RX ADMIN — POTASSIUM CHLORIDE AND SODIUM CHLORIDE: 900; 150 INJECTION, SOLUTION INTRAVENOUS at 00:40

## 2020-06-11 ASSESSMENT — COGNITIVE AND FUNCTIONAL STATUS - GENERAL
DAILY ACTIVITIY SCORE: 21
HELP NEEDED FOR BATHING: A LITTLE
SUGGESTED CMS G CODE MODIFIER MOBILITY: CH
HELP NEEDED FOR BATHING: A LITTLE
SUGGESTED CMS G CODE MODIFIER MOBILITY: CH
DRESSING REGULAR LOWER BODY CLOTHING: A LOT
SUGGESTED CMS G CODE MODIFIER DAILY ACTIVITY: CJ
MOBILITY SCORE: 24
SUGGESTED CMS G CODE MODIFIER DAILY ACTIVITY: CI
MOBILITY SCORE: 24
DAILY ACTIVITIY SCORE: 23

## 2020-06-11 ASSESSMENT — GAIT ASSESSMENTS
GAIT LEVEL OF ASSIST: SUPERVISED
DISTANCE (FEET): 250
DEVIATION: NO DEVIATION

## 2020-06-11 ASSESSMENT — LIFESTYLE VARIABLES
ALCOHOL_USE: NO
TOTAL SCORE: 0
TOTAL SCORE: 0
HAVE YOU EVER FELT YOU SHOULD CUT DOWN ON YOUR DRINKING: NO
EVER HAD A DRINK FIRST THING IN THE MORNING TO STEADY YOUR NERVES TO GET RID OF A HANGOVER: NO
TOTAL SCORE: 0
AVERAGE NUMBER OF DAYS PER WEEK YOU HAVE A DRINK CONTAINING ALCOHOL: 0
CONSUMPTION TOTAL: NEGATIVE
EVER_SMOKED: YES
EVER FELT BAD OR GUILTY ABOUT YOUR DRINKING: NO
HAVE PEOPLE ANNOYED YOU BY CRITICIZING YOUR DRINKING: NO
HOW MANY TIMES IN THE PAST YEAR HAVE YOU HAD 5 OR MORE DRINKS IN A DAY: 0
ON A TYPICAL DAY WHEN YOU DRINK ALCOHOL HOW MANY DRINKS DO YOU HAVE: 0

## 2020-06-11 ASSESSMENT — PATIENT HEALTH QUESTIONNAIRE - PHQ9
SUM OF ALL RESPONSES TO PHQ9 QUESTIONS 1 AND 2: 0
1. LITTLE INTEREST OR PLEASURE IN DOING THINGS: NOT AT ALL
2. FEELING DOWN, DEPRESSED, IRRITABLE, OR HOPELESS: NOT AT ALL

## 2020-06-11 ASSESSMENT — ACTIVITIES OF DAILY LIVING (ADL): TOILETING: INDEPENDENT

## 2020-06-11 NOTE — OR NURSING
Pt A&OX4. VSS. Pt on 2 L of oxygen via nasal cannula. Mid lower back incision with sanguinous drainage oozing on bottom of dressing, reinforced with abd pad/tape. Ice pack in use. Hemovac in place, 140 cc total of sanguinous drainage out. On-Q pump rate verified with second RN. 5/5 strength BUE and BLE. Pt denies numbness/tingling. Pt's pain at worst 9/10 to mid lower back. Post PO and IV pain medication, pain best as 3/10. No complaints of nausea, tolerated sips of water. Report called to ADALID Nguyen. Pt out of PACU via [facility] with transport. Wife updated.

## 2020-06-11 NOTE — CARE PLAN
Problem: Communication  Goal: The ability to communicate needs accurately and effectively will improve  Outcome: PROGRESSING AS EXPECTED   Pt communicates needs effectively.   Call light w/in reach. Hourly rounding in place.      Problem: Venous Thromboembolism (VTW)/Deep Vein Thrombosis (DVT) Prevention:  Goal: Patient will participate in Venous Thrombosis (VTE)/Deep Vein Thrombosis (DVT)Prevention Measures  Outcome: PROGRESSING AS EXPECTED   SCDs & TEDs in place.

## 2020-06-11 NOTE — PROGRESS NOTES
Patient asked if he could resume quiNINE for his cramps.Called Dr. Esposito's office & received a telephone order from Derick Beltre P.A.-C for quiNINE 324 mg PO 1 time daily as needed for cramps.

## 2020-06-11 NOTE — PROGRESS NOTES
Patient arrived to floor via gurney with escort. Patient is A&O x 4 On 2 L NC. Surgical dressing on lower lumbar CDI with hemovac and pain pump. Safety precautions in place. Call light and personal belongings within reach. Educated the patient on floor policy and hourly rounding. Patient verbalized understanding.

## 2020-06-11 NOTE — CARE PLAN
Problem: Safety  Goal: Will remain free from falls  Outcome: PROGRESSING AS EXPECTED  Call light and personal belongings within reach, clutter free environment.    Problem: Pain Management  Goal: Pain level will decrease to patient's comfort goal  Outcome: PROGRESSING AS EXPECTED   extra pillows and blankets provided, encourage patient to reposition. Pain medication administered as prescribed.

## 2020-06-11 NOTE — OP REPORT
DATE OF SERVICE:  06/10/2020    SURGEON:  Alexandra Esposito MD    ASSISTANT:  Derick Beltre PA-C    ANESTHESIOLOGIST:  Devin Alba MD    HISTORY:  This is a 60-year-old man.  He had left-sided decompressions in   2009, did well from this.  For the last 6 months, he has had right-sided back,   buttock and posterior thigh pain.  He had a single epidural, which helped.    Pain was 5/10.  It is better when he sits.  It sounds unclaudicatory.  He is   disabled.  His preoperative physical examination showed a reduced range of   motion of the lumbar spine and a well-healed incision.  He had evidence of   weakness.  His MRI scan showed stenosis at L2-L3 and on the right side of   L3-L4 and L4-L5.  I offered him surgery.  He consented.    PREOPERATIVE DIAGNOSES:  1.  Status post likely left L3-L4 and L4-L5 decompression.  2.  Multilevel degenerative disk disease with stable L3-L4, L4-L5, and L5-S1   spondylolisthesis.  3.  Moderate spinal stenosis, right greater than left, L2-L3, L3-L4, L4-L5.  4.  Failed conservative therapy.    POSTOPERATIVE DIAGNOSES:  1.  Status post likely left L3-L4 and L4-L5 decompression.  2.  Multilevel degenerative disk disease with stable L3-L4, L4-L5, and L5-S1   spondylolisthesis.  3.  Moderate spinal stenosis, right greater than left, L2-L3, L3-L4, L4-L5.  4.  Failed conservative therapy.    TITLE OF PROCEDURE:  1.  L2 decompressive laminectomy and bilateral foraminotomies.  2.  L3 decompressive laminectomy and bilateral foraminotomies.  3.  Right L4 laminectomy/foraminotomy.  4.  Right L5 hemilaminectomy/medial facetectomy/foraminotomy.  5.  Microscopic microdissection.  6.  CPT code 11358 -- insertion of paraspinal On-Q pain catheters.  7.  Modifier 22 -- this was a revision procedure.  There was a large amount of   scar tissue.  His body habitus with being over 6 feet 1 inch with enlarged   facet joints and the revision nature of this procedure made consequently ____   greater than 50%  of time and effort was required.    OPERATIVE DETAILS:  After fully informed consent, the patient was brought to   the operating room at [facility].  General anesthesia was   administered.  He was given intravenous antibiotic.  He was positioned prone   on the OSI operating table in the Celestine frame.  All pressure points padded.    Posterior lumbar region was prepped and draped in a standard fashion.  A   midline incision was affected after fluoroscopic localization over the spinous   processes from L2-L5 and bilateral subperiosteal exposure was affected.    Repeat x-ray was taken for confirmation of level.  Laminectomy was then   affected initially using Leksell rongeurs, then an AM-8 drill bit down the   right hand side.  The laminectomy involved the inferior 2/3rd of L3 and half   of L4.  I then used an AM-8 drill bit under the microscope.    I made a gutter down the right side with hemilaminectomies at L3, L4, and L5.    There was a large amount of scar tissue when I got to the dura.  The facet   joints were markedly overgrown and it took about 4 cm of drilling to actually   get to the common thecal sac.  There were a large amount of adhesions on the   right side with a synovial cyst at L3-L4, which was excised.  There was also a   synovial cyst at L4-L5 on the right, which was excised.  The drilling was   painstaking because his facet joints were overgrown, and once I drilled down   to the ligamentum flavum, I used a 2 mm Kerrison punch.  I used sharp   dissection to release off the synovial cyst.  I then went down both sides at   L2-L3 and took out the ligamentum flavum and did foraminotomies.  I then went   down the right side, taking all the ligamentum flavum and scar tissue down.  I   used a 5 angled curette to remove the adhesions around the synovial cyst.  I   did foraminotomies at every level.  Hemostasis was obtained.  There was no CSF   leakage, but there were large amount of  adhesions from the previous surgery.    Everything was decompressed on the right side.  Great care had been taken not   to take too much facet joint.  I decompressed him to the midline.  Hemostasis   was obtained.  Closure was then affected over suction subfascial Hemovac.    Vancomycin powder was placed in the wound.  The wound was closed using   multiple interrupted Vicryl sutures with staples to the skin.  Two paraspinal   On-Q pain catheters were placed.  A single suction subfascial Hemovac was   placed as well.    COMPLICATIONS:  Nil.    ESTIMATED BLOOD LOSS:  100 mL.    The degree of overgrowth of the facet joints was outstanding.  His stenosis   was extreme.  This had been decompressed.  We will see how he progresses.       ____________________________________     MD MARILIN Yadav / JILLIAN    DD:  06/10/2020 15:32:10  DT:  06/10/2020 19:05:55    D#:  [id number]  Job#:  [id number]    cc: Woodrow Adkins MD, Georges Donaldson DO

## 2020-06-11 NOTE — THERAPY
Occupational Therapy   Initial Evaluation     Patient Name: Ra Smith  Age:  60 y.o., Sex:  male  Medical Record #: [id number]  Today's Date: 6/11/2020     Precautions  Precautions: (P) Spinal / Back Precautions     Assessment  Patient is 60 y.o. male adm for back surgery. Pt is able to perform ADLs at supervised level of assist and has 24 hour family assist at home. No further acute OT needs.2    Plan    Recommend Occupational Therapy for Evaluation only     Discharge recommendations:  Anticipate that the patient will have no further occupational therapy needs after discharge from the hospital.            06/11/20 1322   Initial Contact Note    Initial Contact Note Order Received and Verified, Evaluation Only - Patient Does Not Require Further Acute Occupational Therapy at this Time.  However, May Benefit from Post Acute Therapy for Higher Level Functional Deficits.   Prior Living Situation   Prior Services None;Home-Independent   Housing / Facility 1 Story House   Steps Into Home 3   Steps In Home 0   Bathroom Set up Walk In Shower;Bathtub / Shower Combination   Equipment Owned Single Point Cane   Lives with - Patient's Self Care Capacity Spouse   Comments Spouse can assist prn.   Prior Level of ADL Function   Self Feeding Independent   Grooming / Hygiene Independent   Bathing Independent   Dressing Independent   Toileting Independent   Prior Level of IADL Function   Medication Management Independent   Laundry Independent   Kitchen Mobility Independent   Finances Independent   Home Management Independent   Shopping Independent   Prior Level Of Mobility Independent Without Device in Community   Driving / Transportation Driving Independent   Comments Rancher   Precautions   Precautions Spinal / Back Precautions    Balance Assessment   Sitting Balance (Static) Good   Sitting Balance (Dynamic) Good   Standing Balance (Static) Good   Standing Balance (Dynamic) Fair +   Weight Shift Sitting Good   Weight Shift  Standing Good   Comments No AD used or needed   Bed Mobility    Supine to Sit Supervised   Sit to Supine Supervised   Scooting Supervised   Rolling Supervised   Skilled Intervention Verbal Cuing   ADL Assessment   Eating Independent   Grooming Supervision;Standing   Lower Body Dressing Moderate Assist   Toileting Supervision   Functional Mobility   Sit to Stand Supervised   Bed, Chair, Wheelchair Transfer Supervised   Toilet Transfers Supervised   Transfer Method Stand Pivot   Comments No AD used   Activity Tolerance   Sitting in Chair > 20 min   Sitting Edge of Bed 10 min   Standing 10 min   Anticipated Discharge Equipment   DC Equipment None

## 2020-06-11 NOTE — PROGRESS NOTES
2 RN Skin Check    2 RN skin check complete with ADALID Bell  Devices in place: Nasal Cannula, pain pump, SCD's.  Skin assessed under devices: YES.  Confirmed pressure ulcers found on:none.  New potential pressure ulcers noted on N/A.   The following interventions in place Pillows, moisturizers, frequent turns.

## 2020-06-11 NOTE — PROGRESS NOTES
Neurosurgery Progress Note    Subjective:  POD #1  Pain controlled  Mobilizing  -LE symptoms improved  Voiding  Eating and Drinking   No N/V    Exam:  Wound: Moderate drainage from wound  Hemovac: 100cc  Labs stable  VSS  LE motor 5/5 throughout bilaterally    BP  Min: 108/81  Max: 143/98  Pulse  Av.8  Min: 81  Max: 98  Resp  Av.9  Min: 15  Max: 22  Temp  Av.3 °C (97.4 °F)  Min: 35.8 °C (96.5 °F)  Max: 37.1 °C (98.7 °F)  SpO2  Av.8 %  Min: 94 %  Max: 100 %    No data recorded    Recent Labs     20  0056   WBC 10.8   RBC 4.26*   HEMOGLOBIN 12.2*   HEMATOCRIT 38.9*   MCV 91.3   MCH 28.6   MCHC 31.4*   RDW 42.9   PLATELETCT 253   MPV 9.9     Recent Labs     20  0056   SODIUM 135   POTASSIUM 5.1   CHLORIDE 104   CO2 25   GLUCOSE 164*   BUN 14   CREATININE 1.24   CALCIUM 8.0*               Intake/Output       06/10/20 0700 - 20 0659 20 07 - 20 0659      [id number] 9132-2559 Total 3937-3704 9425-0813 Total       Intake    I.V.  2000- 2000  -- 9    PCA End of Shift Total Volume (ml) -- -- -- 9 -- 9    Volume (mL) (lactated ringers infusion) 2000 -- -- --    Total Intake 2000 -- 9       Output    Urine  --  1000 1000  --  -- --    Number of Times Voided -- 2 x 2 x -- -- --    Urine Void (mL) -- 1000 1000 -- -- --    Drains  140  200 340  --  -- --    Output (mL) (Closed/Suction Drain Posterior Back Hemovac) 140 200 340 -- -- --    Blood  150  -- 150  --  -- --    Est. Blood Loss 150 -- 150 -- -- --    Total Output 290 1200 1490 -- -- --       Net I/O     1710 -1200 510 9 -- 9            Intake/Output Summary (Last 24 hours) at 2020 0824  Last data filed at 2020 0707  Gross per 24 hour   Intake  ml   Output 1490 ml   Net 519 ml            • allopurinol  300 mg DAILY   • gabapentin  900 mg BID   • Pharmacy Consult Request  1 Each PHARMACY TO DOSE   • MD ALERT...DO NOT ADMINISTER NSAIDS or ASPIRIN unless ORDERED By Neurosurgery  1 Each PRN    • docusate sodium  100 mg BID   • senna-docusate  1 Tab Nightly   • senna-docusate  1 Tab Q24HRS PRN   • polyethylene glycol/lytes  1 Packet BID PRN   • magnesium hydroxide  30 mL QDAY PRN   • bisacodyl  10 mg Q24HRS PRN   • fleet  1 Each Once PRN   • 0.9 % NaCl with KCl 20 mEq 1,000 mL   Continuous   • morphine   Continuous   • diphenhydrAMINE  25 mg Q6HRS PRN    Or   • diphenhydrAMINE  25 mg Q6HRS PRN   • ondansetron  4 mg Q4HRS PRN   • ondansetron  4 mg Q4HRS PRN   • promethazine  12.5-25 mg Q4HRS PRN   • promethazine  12.5-25 mg Q4HRS PRN   • prochlorperazine  5-10 mg Q4HRS PRN   • methocarbamol  750 mg Q8HRS PRN   • hydrALAZINE  10 mg Q HOUR PRN   • benzocaine-menthol  1 Lozenge Q2HRS PRN   • vitamin D  5,000 Units DAILY   • On-Q Pump - Ropivacaine 270 mL (fixed-flow rate, dual-lumen)   Continuous       Assessment and Plan:  Hospital day #2  POD #1  Prophylactic anticoagulation: no    Plan:  1. D/C PCA  2. Start orals  3. PT/OT  4. Keep Drain and OnQ another day  5. Replace island dressing

## 2020-06-12 VITALS
OXYGEN SATURATION: 97 % | BODY MASS INDEX: 35.06 KG/M2 | WEIGHT: 264.55 LBS | RESPIRATION RATE: 16 BRPM | TEMPERATURE: 98.7 F | SYSTOLIC BLOOD PRESSURE: 120 MMHG | DIASTOLIC BLOOD PRESSURE: 86 MMHG | HEART RATE: 82 BPM | HEIGHT: 73 IN

## 2020-06-12 LAB
ANION GAP SERPL CALC-SCNC: 6 MMOL/L (ref 7–16)
BUN SERPL-MCNC: 14 MG/DL (ref 8–22)
CALCIUM SERPL-MCNC: 8.5 MG/DL (ref 8.5–10.5)
CHLORIDE SERPL-SCNC: 106 MMOL/L (ref 96–112)
CO2 SERPL-SCNC: 28 MMOL/L (ref 20–33)
CREAT SERPL-MCNC: 1.04 MG/DL (ref 0.5–1.4)
ERYTHROCYTE [DISTWIDTH] IN BLOOD BY AUTOMATED COUNT: 42.4 FL (ref 35.9–50)
GLUCOSE SERPL-MCNC: 100 MG/DL (ref 65–99)
HCT VFR BLD AUTO: 34.1 % (ref 42–52)
HGB BLD-MCNC: 10.7 G/DL (ref 14–18)
MCH RBC QN AUTO: 28.2 PG (ref 27–33)
MCHC RBC AUTO-ENTMCNC: 31.4 G/DL (ref 33.7–35.3)
MCV RBC AUTO: 90 FL (ref 81.4–97.8)
PLATELET # BLD AUTO: 216 K/UL (ref 164–446)
PMV BLD AUTO: 10 FL (ref 9–12.9)
POTASSIUM SERPL-SCNC: 4.2 MMOL/L (ref 3.6–5.5)
RBC # BLD AUTO: 3.79 M/UL (ref 4.7–6.1)
SODIUM SERPL-SCNC: 140 MMOL/L (ref 135–145)
WBC # BLD AUTO: 10.5 K/UL (ref 4.8–10.8)

## 2020-06-12 PROCEDURE — 36415 COLL VENOUS BLD VENIPUNCTURE: CPT

## 2020-06-12 PROCEDURE — A9270 NON-COVERED ITEM OR SERVICE: HCPCS | Performed by: PHYSICIAN ASSISTANT

## 2020-06-12 PROCEDURE — 85027 COMPLETE CBC AUTOMATED: CPT

## 2020-06-12 PROCEDURE — 700112 HCHG RX REV CODE 229: Performed by: PHYSICIAN ASSISTANT

## 2020-06-12 PROCEDURE — 700102 HCHG RX REV CODE 250 W/ 637 OVERRIDE(OP): Performed by: PHYSICIAN ASSISTANT

## 2020-06-12 PROCEDURE — G0378 HOSPITAL OBSERVATION PER HR: HCPCS

## 2020-06-12 PROCEDURE — 80048 BASIC METABOLIC PNL TOTAL CA: CPT

## 2020-06-12 RX ORDER — CEPHALEXIN 500 MG/1
500 CAPSULE ORAL 4 TIMES DAILY
Qty: 20 CAP | Refills: 0 | Status: SHIPPED | OUTPATIENT
Start: 2020-06-12 | End: 2021-07-30

## 2020-06-12 RX ORDER — OXYCODONE HYDROCHLORIDE AND ACETAMINOPHEN 5; 325 MG/1; MG/1
1-2 TABLET ORAL EVERY 6 HOURS PRN
Qty: 56 TAB | Refills: 0 | Status: SHIPPED | OUTPATIENT
Start: 2020-06-12 | End: 2020-06-19

## 2020-06-12 RX ORDER — METHOCARBAMOL 750 MG/1
750 TABLET, FILM COATED ORAL EVERY 8 HOURS PRN
Qty: 90 TAB | Refills: 0 | Status: SHIPPED | OUTPATIENT
Start: 2020-06-12 | End: 2024-02-17

## 2020-06-12 RX ADMIN — GABAPENTIN 900 MG: 300 CAPSULE ORAL at 05:04

## 2020-06-12 RX ADMIN — QUININE SULFATE 324 MG: 324 CAPSULE ORAL at 10:40

## 2020-06-12 RX ADMIN — ALLOPURINOL 300 MG: 300 TABLET ORAL at 05:04

## 2020-06-12 RX ADMIN — MELATONIN 5000 UNITS: at 05:04

## 2020-06-12 RX ADMIN — OXYCODONE HYDROCHLORIDE AND ACETAMINOPHEN 2 TABLET: 5; 325 TABLET ORAL at 16:54

## 2020-06-12 RX ADMIN — DOCUSATE SODIUM 100 MG: 100 CAPSULE, LIQUID FILLED ORAL at 05:04

## 2020-06-12 NOTE — PROGRESS NOTES
Neurosurgery Progress Note    Subjective:  POD #2  Seen with Dr. Esposito  Pain controlled  Mobilizing  -LE symptoms improved  Voiding  Eating and Drinking   No N/V    Exam:  Wound: C/D/I  Hemovac: 160cc  -drain emptied twice yesterday  Labs stable  VSS  LE motor 5/5 throughout bilaterally    BP  Min: 98/72  Max: 116/74  Pulse  Av.8  Min: 82  Max: 93  Resp  Av.2  Min: 16  Max: 17  Temp  Av.2 °C (99 °F)  Min: 36.9 °C (98.4 °F)  Max: 37.5 °C (99.5 °F)  SpO2  Av.4 %  Min: 95 %  Max: 98 %    No data recorded    Recent Labs     20  00520  0623   WBC 10.8 10.5   RBC 4.26* 3.79*   HEMOGLOBIN 12.2* 10.7*   HEMATOCRIT 38.9* 34.1*   MCV 91.3 90.0   MCH 28.6 28.2   MCHC 31.4* 31.4*   RDW 42.9 42.4   PLATELETCT 253 216   MPV 9.9 10.0     Recent Labs     20  0056 20  0623   SODIUM 135 140   POTASSIUM 5.1 4.2   CHLORIDE 104 106   CO2 25 28   GLUCOSE 164* 100*   BUN 14 14   CREATININE 1.24 1.04   CALCIUM 8.0* 8.5               Intake/Output       20 0700 - 20 0659 20 - 20 0659       Total 1900-0659 Total       Intake    P.O.  480  240 720  --  -- --    P.O. 480 240 720 -- -- --    I.V.    --   --  -- --    PCA End of Shift Total Volume (ml) 9 -  -- -- --    Total Intake 489 240 729 -- -- --       Output    Urine  --  2075  --  -- --    Number of Times Voided 1 x 3 x 4 x -- -- --    Urine Void (mL) -- 2075 -- -- --    Drains  175  160 335  --  -- --    Output (mL) (Closed/Suction Drain Posterior Back Hemovac) 175 160 335 -- -- --    Total Output 175 2235 2410 -- -- --       Net I/O     [phone] -- -- --            Intake/Output Summary (Last 24 hours) at 2020 0750  Last data filed at 2020 0500  Gross per 24 hour   Intake 720 ml   Output 2410 ml   Net -1690 ml            • oxyCODONE-acetaminophen  1-2 Tab Q4HRS PRN   • quiNINE  324 mg QDAY PRN   • allopurinol  300 mg DAILY   • gabapentin  900 mg BID    • Pharmacy Consult Request  1 Each PHARMACY TO DOSE   • MD ALERT...DO NOT ADMINISTER NSAIDS or ASPIRIN unless ORDERED By Neurosurgery  1 Each PRN   • docusate sodium  100 mg BID   • senna-docusate  1 Tab Nightly   • senna-docusate  1 Tab Q24HRS PRN   • polyethylene glycol/lytes  1 Packet BID PRN   • magnesium hydroxide  30 mL QDAY PRN   • bisacodyl  10 mg Q24HRS PRN   • fleet  1 Each Once PRN   • 0.9 % NaCl with KCl 20 mEq 1,000 mL   Continuous   • diphenhydrAMINE  25 mg Q6HRS PRN    Or   • diphenhydrAMINE  25 mg Q6HRS PRN   • ondansetron  4 mg Q4HRS PRN   • ondansetron  4 mg Q4HRS PRN   • promethazine  12.5-25 mg Q4HRS PRN   • promethazine  12.5-25 mg Q4HRS PRN   • prochlorperazine  5-10 mg Q4HRS PRN   • methocarbamol  750 mg Q8HRS PRN   • hydrALAZINE  10 mg Q HOUR PRN   • benzocaine-menthol  1 Lozenge Q2HRS PRN   • vitamin D  5,000 Units DAILY   • On-Q Pump - Ropivacaine 270 mL (fixed-flow rate, dual-lumen)   Continuous       Assessment and Plan:  Hospital day #3  POD #2  Prophylactic anticoagulation: no    Plan:  1. Mobilize  2. Orals  3. PT/OT  4. D/C Drain and OnQ today at 1600hrs  5. D/C home today at 1600hrs

## 2020-06-12 NOTE — DISCHARGE INSTRUCTIONS
Discharge Instructions    Discharged to home by car with relative. Discharged via wheelchair, hospital escort: Yes.  Special equipment needed: Not Applicable    Be sure to schedule a follow-up appointment with your primary care doctor or any specialists as instructed.     Discharge Plan:   Diet Plan: I understand that a diet low in cholesterol, fat, and sodium is recommended for good health. Unless I have been given specific instructions below for another diet, I accept this instruction as my diet prescription.   Activity Level: Discussed  Confirmed Follow up Appointment: Patient to Call and Schedule Appointment  Confirmed Symptoms Management: Discussed  Medication Reconciliation Updated: Yes      · Is patient discharged on Warfarin / Coumadin?   No     Depression / Suicide Risk    As you are discharged from this [facility] facility, it is important to learn how to keep safe from harming yourself.    Recognize the warning signs:  · Abrupt changes in personality, positive or negative- including increase in energy   · Giving away possessions  · Change in eating patterns- significant weight changes-  positive or negative  · Change in sleeping patterns- unable to sleep or sleeping all the time   · Unwillingness or inability to communicate  · Depression  · Unusual sadness, discouragement and loneliness  · Talk of wanting to die  · Neglect of personal appearance   · Rebelliousness- reckless behavior  · Withdrawal from people/activities they love  · Confusion- inability to concentrate     If you or a loved one observes any of these behaviors or has concerns about self-harm, here's what you can do:  · Talk about it- your feelings and reasons for harming yourself  · Remove any means that you might use to hurt yourself (examples: pills, rope, extension cords, firearm)  · Get professional help from the community (Mental Health, Substance Abuse, psychological counseling)  · Do not be alone:Call your Safe Contact- someone  whom you trust who will be there for you.  · Call your local CRISIS HOTLINE 760-7053 or 134-788-8097  · Call your local Children's Mobile Crisis Response Team [place] (962) 948-5731 or www."[employer]  · Call the toll free National Suicide Prevention Hotlines   · National Suicide Prevention Lifeline 421-278-SMAK (6847)  · National Hope Line Network 800-SUICIDE (120-4803)        Lumbar Laminectomy, Care After  Refer to this sheet in the next few weeks. These instructions provide you with information on caring for yourself after your procedure. Your health care provider may also give you more specific instructions. Your treatment has been planned according to current medical practices, but problems sometimes occur. Call your health care provider if you have any problems or questions after your procedure.  HOME CARE INSTRUCTIONS   · Check the incision twice a day for signs of infection. Some signs may include a foul-smelling, greenish or yellowish discharge from the wound, increased pain, or increased redness over the incision.  · Change your bandages about 24-36 hours after surgery, or as directed.  · You may shower once the bandage is removed, or as directed. Avoid tub baths, swimming, and hot tubs for 3 weeks or until your incision has healed completely. If you have stitches or staples, they may be removed 2-3 weeks after surgery, or as directed by your doctor.  · Daily exercise is helpful to prevent the return of problems. Walking is permitted. You may use a treadmill without an incline. Cut down on activities and exercise if you have discomfort. You may also go up and down stairs as much as you can tolerate.  · Do not lift anything heavier than 15 lb. Avoid bending or twisting at the waist. Always bend your knees.  · Maintain strength and range of motion as instructed.  · Do not drive for 2-3 weeks, or as directed by your doctor. You may be a passenger for 20-30 minutes at a time. Lying back in the  passenger seat may be more comfortable for you.  · Limit your sitting to intervals of 20-30 minutes. You should lie down or walk in between sitting periods. There are no limitations for sitting in a recliner chair.  · Only take over-the-counter or prescription medicines for pain, discomfort, or fever as directed by your health care provider.  SEEK MEDICAL CARE IF:   · There is increased bleeding (more than a small spot) from the wound.  · You notice redness, swelling, or increasing pain in the wound.  · Pus is coming from the wound.  · You have a fever for more than 2-3 days.  · You notice a foul smell coming from the wound or dressing.  · You have increasing pain in your wound.  SEEK IMMEDIATE MEDICAL CARE IF:   · You develop a rash.  · You have difficulty breathing.  · You have any allergic problems.  · You develop a headache or stiff neck that does not respond to pain relievers.  · You are unable to urinate.  · You develop new onset of pain, numbness, or weakness in the buttocks or lower extremities.  MAKE SURE YOU:   · Understand these instructions.  · Will watch your condition.  · Will get help right away if you are not doing well or get worse.     This information is not intended to replace advice given to you by your health care provider. Make sure you discuss any questions you have with your health care provider.     Document Released: 11/21/2005 Document Revised: 08/20/2014 Document Reviewed: 05/15/2014  [employer] Interactive Patient Education ©2016 [employer]      Lumbar Diskectomy  Introduction  Lumbar diskectomy is a surgical procedure that treats low back pain that is caused by an injured disk. Disks are cushions in your spine that are between your spinal bones (vertebrae). When a disk ruptures or pushes out of its normal space (herniates), it can cause pressure on your spinal cord or the nerves that leave your spinal cord. This can cause back pain, numbness, or weakness.  You may need this procedure  if other treatments have not worked.  Tell a health care provider about:  · Any allergies you have.  · All medicines you are taking, including vitamins, herbs, eye drops, creams, and over-the-counter medicines.  · Any problems you or family members have had with anesthetic medicines.  · Any blood disorders you have.  · Any surgeries you have had.  · Any medical conditions you have.  What are the risks?  Generally, this is a safe procedure. However, problems may occur, including:  · Bleeding.  · Infection.  · Damage to the nerve or spinal cord.  · Worsening pain.  · Failure to relieve your symptoms.  What happens before the procedure?  · You may need an imaging study done of your spine to help plan the procedure.  · Follow instructions from your health care provider about eating or drinking restrictions.  · Ask your health care provider about:  ¨ Changing or stopping your regular medicines. This is especially important if you are taking diabetes medicines or blood thinners.  ¨ Taking medicines such as aspirin and ibuprofen. These medicines can thin your blood. Do not take these medicines before your procedure if your health care provider instructs you not to.  · Do not drink alcohol.  · Do not use any tobacco products, including cigarettes, chewing tobacco, and electronic cigarettes. If you need help quitting, ask your health care provider.  · Plan to have someone take you home after the procedure.  · If you go home right after the procedure, plan to have someone with you for 24 hours.  What happens during the procedure?  · An IV tube will be inserted into one of your veins.  · You will be given one or more of the following:  ¨ A medicine that helps you relax (sedative).  ¨ A medicine that numbs the area (local anesthetic).  ¨ A medicine that makes you fall asleep (general anesthetic).  ¨ A medicine that is injected into your spine that numbs the area below and slightly above the injection site (spinal  anesthetic).  · You will be positioned face-down on the operating table.  · Your lower back will be cleaned with a germ-killing (antiseptic) solution.  · Your surgeon will make an incision over your spine.  · The muscles and nerves over your spine will be moved aside so your vertebrae and injured disk can be seen easily. Your surgeon may use a specific type of operating microscope.  · Your surgeon may need to remove some connecting tissue (ligaments) or pieces of bone to get to your disk.  · Your surgeon will remove the part of the disk that is causing your symptoms.  · The muscles and nerves will be put back in their normal position.  · The incision will be closed with stitches (sutures) or staples. A bandage (dressing) will be placed over the incision.  The procedure may vary among health care providers and hospitals.  What happens after the procedure?  · Your blood pressure, heart rate, breathing rate, and blood oxygen level will be monitored often until the medicines you were given have worn off.  · Your IV tube can be taken out when you are able to drink fluids on your own.  · You will be encouraged to get up and walk around as soon as you can.  · You may meet with a physical therapist to talk about recovering at home.  This information is not intended to replace advice given to you by your health care provider. Make sure you discuss any questions you have with your health care provider.  Document Released: 05/03/2016 Document Revised: 05/25/2017 Document Reviewed: 11/25/2015  © 2017 [employer]      Lumbar Diskectomy, Care After  Refer to this sheet in the next few weeks. These instructions provide you with information about caring for yourself after your procedure. Your health care provider may also give you more specific instructions. Your treatment has been planned according to current medical practices, but problems sometimes occur. Call your health care provider if you have any problems or questions after  your procedure.  WHAT TO EXPECT AFTER THE PROCEDURE  After your procedure, it is common to have:  · Pain.  · Numbness.  · Weakness.  HOME CARE INSTRUCTIONS  Medicines  · Take medicines only as directed by your health care provider.  · If you were prescribed an antibiotic medicine, finish all of it even if you start to feel better.  Incision Care  · There are many different ways to close and cover an incision, including stitches (sutures), skin glue, and adhesive strips. Follow your health care provider's instructions about:  ¨ Incision care.  ¨ Bandage (dressing) changes and removal.  ¨ Incision closure removal.  · Check your incision area every day for signs of infection. If you cannot see your incision, have someone check it for you. Watch for:  ¨ Redness, swelling, or pain.  ¨ Fluid, blood, or pus.   Activities  · Avoid sitting for longer than 20 minutes at a time or as directed by your health care provider.  · Do not climb stairs more than once each day until your health care provider approves.  · Do not bend at your waist. To pick things up, bend your knees.  · Do not lift anything that is heavier than 10 lb (4.5 kg) or as directed by your health care provider.  · Do not drive a car until your health care provider approves.  · Ask your health care provider when you may return to your normal activities, such as playing sports and going back to work.  · Work with your physical therapist to learn safe movement and exercises to help healing. Do these exercises as directed.  · Take short walks often.  General Instructions  · Do not use any tobacco products, including cigarettes, chewing tobacco, or electronic cigarettes. If you need help quitting, ask your health care provider.  · Follow your health care provider's instructions about bathing. Do not take baths, shower, swim, or use a hot tub until your health care provider approves.  · Wear your back brace as directed by your health care provider.  · To prevent  constipation:  ¨ Drink enough fluid to keep your urine clear or pale yellow.  ¨ Eat plenty of fruits, vegetables, and whole grains.  · Keep all follow-up visits as directed by your health care provider. This is important. This includes any follow-up visits with your physical therapist.  SEEK MEDICAL CARE IF:  · You have a fever.  · You have redness, swelling, or pain in your incision area.  · Your pain is not controlled with medicine.  · You have pain, numbness, or weakness that lasts longer than three weeks after surgery.  · You become constipated.  SEEK IMMEDIATE MEDICAL CARE IF:  · You have fluid, blood, or pus coming from your incision.  · You have increasing pain, numbness, or weakness.  · You lose control of when you urinate or have a bowel movement (incontinence).  · You have chest pain.  · You have trouble breathing.     This information is not intended to replace advice given to you by your health care provider. Make sure you discuss any questions you have with your health care provider.     Document Released: 11/22/2005 Document Revised: 01/08/2016 Document Reviewed: 08/12/2015  [employer] Interactive Patient Education ©2016 [employer]

## 2020-06-12 NOTE — THERAPY
Physical Therapy   Initial Evaluation     Patient Name: Ra Smith  Age:  60 y.o., Sex:  male  Medical Record #: [id number]  Today's Date: 6/11/2020     Precautions: Spinal / Back Precautions     Assessment  Patient is 60 y.o. male presenting to physical therapy s/p  L2-3 laminectomy and L3-5 decompressive lami and foraminotomy. Pt demonstrated functional mobility and gait at SPV level. Reports minimal post operative pain. Is aware of spinal precautions and log roll from prior spine surgery. No further needs    Plan    Recommend Physical Therapy for Evaluation only. Continue to allow pt to ambulate unit and up to chair for meals    Discharge recommendations:  Anticipate that the patient will have no further physical therapy needs after discharge from the hospital.       06/11/20 1314   Precautions   Precautions Spinal / Back Precautions    Pain 0 - 10 Group   Therapist Pain Assessment During Activity;Nurse Notified  (mild post operative pain)   Prior Living Situation   Prior Services Home-Independent   Housing / Facility 1 Story House   Steps Into Home 3   Steps In Home 0   Equipment Owned Single Point Cane   Lives with - Patient's Self Care Capacity Spouse   Comments pt is from [place], spouse and dtr are able to assist upon DC home   Prior Level of Functional Mobility   Bed Mobility Independent   Transfer Status Independent   Ambulation Independent   Distance Ambulation (Feet)   (community)   Assistive Devices Used None   Stairs Independent   Cognition    Cognition / Consciousness WDL   Level of Consciousness Alert   Comments receptive to PT education   Active ROM Lower Body    Active ROM Lower Body  WDL   Strength Lower Body   Lower Body Strength  WDL   Balance Assessment   Sitting Balance (Static) Good   Sitting Balance (Dynamic) Good   Standing Balance (Static) Good   Standing Balance (Dynamic) Fair +   Weight Shift Sitting Good   Weight Shift Standing Good   Comments w/o AD   Gait Analysis   Gait Level Of  Assist Supervised   Assistive Device None   Distance (Feet) 250   # of Times Distance was Traveled 1   Deviation No deviation   # of Stairs Climbed 2   Level of Assist with Stairs Supervised   Weight Bearing Status no restrictions   Bed Mobility    Supine to Sit Supervised   Scooting Supervised   Rolling Supervised   Comments demo'ed independent understanding of log roll   Functional Mobility   Sit to Stand Supervised   Bed, Chair, Wheelchair Transfer Supervised   Transfer Method Stand Pivot

## 2020-06-12 NOTE — CARE PLAN
Problem: Safety  Goal: Will remain free from falls  Outcome: PROGRESSING AS EXPECTED   call light and personal belongings within reach, clutter free environment.  Problem: Pain Management  Goal: Pain level will decrease to patient's comfort goal  Outcome: PROGRESSING AS EXPECTED   pain medication given as prescribed

## 2020-06-13 NOTE — DISCHARGE SUMMARY
DATE OF ADMISSION:  06/10/2020    DATE OF DISCHARGE:  06/12/2020     DISCHARGE DIAGNOSES:  1.  Status post L2-L3 decompressive laminectomy.  2.  Right L4 and L5 laminectomies/foraminotomies.  3.  Status post previous L3-L4 and L4-L5 decompression on the left.  4.  Lumbar spinal stenosis.    OPERATION PERFORMED:  See above.    REASON FOR ADMISSION:  The patient is a very nice 60-year-old male known to   our office.  He had long history of low back and leg symptoms.  He had a   previous lumbar surgery.  He had persisting symptoms.  He had multiple   conservative therapies with persisting symptoms with imaging which showed the   stenosis.  Due to these findings, he was offered the above-mentioned surgery   and he did consent.    HOSPITAL COURSE:  Patient underwent the above operation without any   complications and was transferred to the medical/surgical floor.  On   postoperative day #1, he was eating, drinking, mobilizing and voiding.  His   pain ____ was switched from IV to oral analgesia.  His drain was not ready to   be discontinued.  On postoperative day #2, he continued to make progress.  His   drain was discontinued.  He was hemodynamically stable and at that time, it   was determined he met the criteria for discharge and was discharged to home.    DISCHARGE INSTRUCTIONS:  He is to be extremely cautious with any bending,   flexing, twisting about the low back.  He has followup appointments with our   office in 2 and 6 weeks' time and has been instructed to keep these.  He is to   contact our office with any questions or concerns.    DISCHARGE MEDICATIONS:  1.  Percocet 5/325, sig. is 1-2 tabs p.o. q. 6 hours p.r.n. for pain, #56.  2.  Keflex 500 mg, sig. is 1 tab p.o. q.i.d. for 5 days.  3.  Robaxin 750 mg, sig. is 1 tab p.o. t.i.d. p.r.n. for spasms, #90, no   refills.      Again, I have answered his questions to the best of my ability.  He is now   again discharged to home in stable condition.  We will follow  up with him as   an outpatient.       ____________________________________     EDIE Owens / JILLIAN    DD:  06/12/2020 08:01:39  DT:  06/13/2020 00:23:09    D#:  [id number]  Job#:  [id number]    cc: Woodrow Adkins MD, Georges Donaldson DO

## 2020-09-22 ENCOUNTER — HOSPITAL ENCOUNTER (OUTPATIENT)
Dept: LAB | Facility: MEDICAL CENTER | Age: 60
End: 2020-09-22
Attending: UROLOGY
Payer: COMMERCIAL

## 2020-09-22 PROCEDURE — 84153 ASSAY OF PSA TOTAL: CPT

## 2020-09-23 LAB — PSA SERPL-MCNC: 1.92 NG/ML (ref 0–4)

## 2020-11-27 NOTE — ANESTHESIA QCDR
2019 [facility] Clinical Data Registry (for Quality Improvement)     Postoperative nausea/vomiting risk protocol (Adult = 18 yrs and Pediatric 3-17 yrs)- (430 and 463)  General inhalation anesthetic (NOT TIVA) with PONV risk factors: Yes  Provision of anti-emetic therapy with at least 2 different classes of agents: Yes   Patient DID NOT receive anti-emetic therapy and reason is documented in Medical Record:  N/A    Multimodal Pain Management- (AQI59)  Patient undergoing Elective Surgery (i.e. Outpatient, or ASC, or Prescheduled Surgery prior to Hospital Admission): Yes  Use of Multimodal Pain Management, two or more drugs and/or interventions, NOT including systemic opioids: Yes   Exception: Documented allergy to multiple classes of analgesics:  N/A    PACU assessment of acute postoperative pain prior to Anesthesia Care End- Applies to Patients Age = 18- (ABG7)  Initial PACU pain score is which of the following: < 7/10  Patient unable to report pain score: N/A    Post-anesthetic transfer of care checklist/protocol to PACU/ICU- (426 and 427)  Upon conclusion of case, patient transferred to which of the following locations: PACU/Non-ICU  Use of transfer checklist/protocol: Yes  Exclusion: Service Performed in Patient Hospital Room (and thus did not require transfer): N/A    PACU Reintubation- (AQI31)  General anesthesia requiring endotracheal intubation (ETT) along with subsequent extubation in OR or PACU: No  Required reintubation in the PACU: N/A  Extubation was a planned trial documented in the medical record prior to removal of the original airway device: N/A    Unplanned admission to ICU related to anesthesia service up through end of PACU care- (MD51)  Unplanned admission to ICU (not initially anticipated at anesthesia start time): No          Statement Selected

## 2020-12-22 ENCOUNTER — HOSPITAL ENCOUNTER (OUTPATIENT)
Dept: LAB | Facility: MEDICAL CENTER | Age: 60
End: 2020-12-22
Attending: ANESTHESIOLOGY
Payer: COMMERCIAL

## 2020-12-22 LAB
COVID ORDER STATUS COVID19: NORMAL
SARS-COV-2 RNA RESP QL NAA+PROBE: NOTDETECTED
SPECIMEN SOURCE: NORMAL

## 2020-12-22 PROCEDURE — U0003 INFECTIOUS AGENT DETECTION BY NUCLEIC ACID (DNA OR RNA); SEVERE ACUTE RESPIRATORY SYNDROME CORONAVIRUS 2 (SARS-COV-2) (CORONAVIRUS DISEASE [COVID-19]), AMPLIFIED PROBE TECHNIQUE, MAKING USE OF HIGH THROUGHPUT TECHNOLOGIES AS DESCRIBED BY CMS-2020-01-R: HCPCS

## 2020-12-22 PROCEDURE — C9803 HOPD COVID-19 SPEC COLLECT: HCPCS

## 2021-05-05 ENCOUNTER — HOSPITAL ENCOUNTER (OUTPATIENT)
Dept: LAB | Facility: MEDICAL CENTER | Age: 61
End: 2021-05-05
Attending: INTERNAL MEDICINE
Payer: COMMERCIAL

## 2021-05-05 PROCEDURE — 84153 ASSAY OF PSA TOTAL: CPT

## 2021-05-05 PROCEDURE — 80061 LIPID PANEL: CPT

## 2021-05-05 PROCEDURE — 80048 BASIC METABOLIC PNL TOTAL CA: CPT

## 2021-05-05 PROCEDURE — 36415 COLL VENOUS BLD VENIPUNCTURE: CPT

## 2021-05-05 PROCEDURE — 83036 HEMOGLOBIN GLYCOSYLATED A1C: CPT

## 2021-05-05 PROCEDURE — 84550 ASSAY OF BLOOD/URIC ACID: CPT

## 2021-05-06 LAB
ANION GAP SERPL CALC-SCNC: 9 MMOL/L (ref 7–16)
BUN SERPL-MCNC: 11 MG/DL (ref 8–22)
CALCIUM SERPL-MCNC: 9 MG/DL (ref 8.5–10.5)
CHLORIDE SERPL-SCNC: 106 MMOL/L (ref 96–112)
CHOLEST SERPL-MCNC: 147 MG/DL (ref 100–199)
CO2 SERPL-SCNC: 26 MMOL/L (ref 20–33)
CREAT SERPL-MCNC: 1.1 MG/DL (ref 0.5–1.4)
EST. AVERAGE GLUCOSE BLD GHB EST-MCNC: 100 MG/DL
FASTING STATUS PATIENT QL REPORTED: NORMAL
GLUCOSE SERPL-MCNC: 104 MG/DL (ref 65–99)
HBA1C MFR BLD: 5.1 % (ref 4–5.6)
HDLC SERPL-MCNC: 55 MG/DL
LDLC SERPL CALC-MCNC: 71 MG/DL
POTASSIUM SERPL-SCNC: 4.1 MMOL/L (ref 3.6–5.5)
PSA SERPL-MCNC: 3.02 NG/ML (ref 0–4)
SODIUM SERPL-SCNC: 141 MMOL/L (ref 135–145)
TRIGL SERPL-MCNC: 106 MG/DL (ref 0–149)
URATE SERPL-MCNC: 5.3 MG/DL (ref 2.5–8.3)

## 2021-08-02 ENCOUNTER — HOSPITAL ENCOUNTER (OUTPATIENT)
Dept: LAB | Facility: MEDICAL CENTER | Age: 61
End: 2021-08-02
Attending: INTERNAL MEDICINE
Payer: COMMERCIAL

## 2021-08-02 LAB
APPEARANCE UR: CLEAR
BACTERIA #/AREA URNS HPF: NEGATIVE /HPF
BILIRUB UR QL STRIP.AUTO: NEGATIVE
CAOX CRY #/AREA URNS HPF: ABNORMAL /HPF
COLOR UR: ABNORMAL
EPI CELLS #/AREA URNS HPF: NEGATIVE /HPF
GLUCOSE UR STRIP.AUTO-MCNC: NEGATIVE MG/DL
HYALINE CASTS #/AREA URNS LPF: ABNORMAL /LPF
KETONES UR STRIP.AUTO-MCNC: ABNORMAL MG/DL
LEUKOCYTE ESTERASE UR QL STRIP.AUTO: ABNORMAL
MICRO URNS: ABNORMAL
NITRITE UR QL STRIP.AUTO: NEGATIVE
PH UR STRIP.AUTO: 5.5 [PH] (ref 5–8)
PROT UR QL STRIP: NEGATIVE MG/DL
RBC # URNS HPF: ABNORMAL /HPF
RBC UR QL AUTO: NEGATIVE
SP GR UR STRIP.AUTO: 1.03
UROBILINOGEN UR STRIP.AUTO-MCNC: 0.2 MG/DL
WBC #/AREA URNS HPF: ABNORMAL /HPF

## 2021-08-02 PROCEDURE — 81001 URINALYSIS AUTO W/SCOPE: CPT

## 2021-10-25 ENCOUNTER — HOSPITAL ENCOUNTER (OUTPATIENT)
Dept: LAB | Facility: MEDICAL CENTER | Age: 61
End: 2021-10-25
Attending: UROLOGY
Payer: COMMERCIAL

## 2021-10-25 LAB — PSA SERPL-MCNC: 3.46 NG/ML (ref 0–4)

## 2021-10-25 PROCEDURE — 84153 ASSAY OF PSA TOTAL: CPT

## 2021-10-25 PROCEDURE — 36415 COLL VENOUS BLD VENIPUNCTURE: CPT

## 2021-11-08 ENCOUNTER — HOSPITAL ENCOUNTER (OUTPATIENT)
Dept: LAB | Facility: MEDICAL CENTER | Age: 61
End: 2021-11-08
Attending: UROLOGY
Payer: COMMERCIAL

## 2021-11-08 LAB — PSA SERPL-MCNC: 3.14 NG/ML (ref 0–4)

## 2021-11-08 PROCEDURE — 84153 ASSAY OF PSA TOTAL: CPT

## 2021-11-08 PROCEDURE — 36415 COLL VENOUS BLD VENIPUNCTURE: CPT

## 2022-03-14 NOTE — OR SURGEON
CALLED PATIENT AND ADVISED.  PATIENT WILL WAIT FOR APPT WITH CB. Christine Woods Immediate Post OP Note    PreOp Diagnosis: spinal stenosis    PostOp Diagnosis: spinal stenosis  Procedure(s):  LAMINECTOMY, SPINE, LUMBAR, WITH DISCECTOMY-L2-3 AND RIGHT L3-5 DECOMPRESSIVE LAMI - Wound Class: Clean with Drain  FORAMINOTOMY, SPINE - Wound Class: Clean with Drain    Surgeon(s):  Alexandra Esposito M.D.    Anesthesiologist/Type of Anesthesia:  Anesthesiologist: Devin Alba M.D./General    Surgical Staff:  Circulator: Lukas D. Gansert, R.N.  Scrub Person: Mela Romero Assist: Derick Beltre P.A.-C.  Radiology Technologist: Juli Saini; Kiara Husain    Specimens removed if any:  * No specimens in log *    Assistants: Derick Beltre PA-C/ Specimen: nil    Estimated Blood Loss: 100 cc    Findings: n/a    Complications: nil        6/10/2020 3:25 PM Alexandra Esposito M.D.

## 2022-04-08 NOTE — CARE PLAN
Problem: Pain Management  Goal: Pain level will decrease to patient's comfort goal  Outcome: PROGRESSING AS EXPECTED  Pain level assessed during hourly rounding. Pt medicated with Percocet for pain with improvement in comfort.        no

## 2022-05-20 ENCOUNTER — HOSPITAL ENCOUNTER (OUTPATIENT)
Dept: LAB | Facility: MEDICAL CENTER | Age: 62
End: 2022-05-20
Attending: INTERNAL MEDICINE
Payer: COMMERCIAL

## 2022-05-20 LAB
CHOLEST SERPL-MCNC: 147 MG/DL (ref 100–199)
FASTING STATUS PATIENT QL REPORTED: NORMAL
HDLC SERPL-MCNC: 53 MG/DL
LDLC SERPL CALC-MCNC: 83 MG/DL
TRIGL SERPL-MCNC: 55 MG/DL (ref 0–149)
URATE SERPL-MCNC: 5 MG/DL (ref 2.5–8.3)

## 2022-05-20 PROCEDURE — 82306 VITAMIN D 25 HYDROXY: CPT

## 2022-05-20 PROCEDURE — 36415 COLL VENOUS BLD VENIPUNCTURE: CPT

## 2022-05-20 PROCEDURE — 84550 ASSAY OF BLOOD/URIC ACID: CPT

## 2022-05-20 PROCEDURE — 85025 COMPLETE CBC W/AUTO DIFF WBC: CPT

## 2022-05-20 PROCEDURE — 80061 LIPID PANEL: CPT

## 2022-05-20 PROCEDURE — 83036 HEMOGLOBIN GLYCOSYLATED A1C: CPT

## 2022-05-21 LAB
BASOPHILS # BLD AUTO: 0.3 % (ref 0–1.8)
BASOPHILS # BLD: 0.02 K/UL (ref 0–0.12)
EOSINOPHIL # BLD AUTO: 0.07 K/UL (ref 0–0.51)
EOSINOPHIL NFR BLD: 1 % (ref 0–6.9)
ERYTHROCYTE [DISTWIDTH] IN BLOOD BY AUTOMATED COUNT: 44 FL (ref 35.9–50)
EST. AVERAGE GLUCOSE BLD GHB EST-MCNC: 97 MG/DL
HBA1C MFR BLD: 5 % (ref 4–5.6)
HCT VFR BLD AUTO: 48.3 % (ref 42–52)
HGB BLD-MCNC: 15 G/DL (ref 14–18)
IMM GRANULOCYTES # BLD AUTO: 0.02 K/UL (ref 0–0.11)
IMM GRANULOCYTES NFR BLD AUTO: 0.3 % (ref 0–0.9)
LYMPHOCYTES # BLD AUTO: 2.95 K/UL (ref 1–4.8)
LYMPHOCYTES NFR BLD: 41.7 % (ref 22–41)
MCH RBC QN AUTO: 28.8 PG (ref 27–33)
MCHC RBC AUTO-ENTMCNC: 31.1 G/DL (ref 33.7–35.3)
MCV RBC AUTO: 92.7 FL (ref 81.4–97.8)
MONOCYTES # BLD AUTO: 0.77 K/UL (ref 0–0.85)
MONOCYTES NFR BLD AUTO: 10.9 % (ref 0–13.4)
NEUTROPHILS # BLD AUTO: 3.24 K/UL (ref 1.82–7.42)
NEUTROPHILS NFR BLD: 45.8 % (ref 44–72)
NRBC # BLD AUTO: 0 K/UL
NRBC BLD-RTO: 0 /100 WBC
PLATELET # BLD AUTO: 268 K/UL (ref 164–446)
PMV BLD AUTO: 11.2 FL (ref 9–12.9)
RBC # BLD AUTO: 5.21 M/UL (ref 4.7–6.1)
WBC # BLD AUTO: 7.1 K/UL (ref 4.8–10.8)

## 2022-05-22 ENCOUNTER — OFFICE VISIT (OUTPATIENT)
Dept: URGENT CARE | Facility: PHYSICIAN GROUP | Age: 62
End: 2022-05-22
Payer: COMMERCIAL

## 2022-05-22 VITALS
HEIGHT: 74 IN | BODY MASS INDEX: 31.57 KG/M2 | OXYGEN SATURATION: 97 % | DIASTOLIC BLOOD PRESSURE: 80 MMHG | SYSTOLIC BLOOD PRESSURE: 128 MMHG | TEMPERATURE: 97.3 F | HEART RATE: 89 BPM | RESPIRATION RATE: 14 BRPM | WEIGHT: 246 LBS

## 2022-05-22 DIAGNOSIS — R68.89 FLU-LIKE SYMPTOMS: ICD-10-CM

## 2022-05-22 DIAGNOSIS — Z11.52 ENCOUNTER FOR SCREENING FOR COVID-19: ICD-10-CM

## 2022-05-22 DIAGNOSIS — T78.40XA ALLERGIC SYMPTOMS, INITIAL ENCOUNTER: ICD-10-CM

## 2022-05-22 LAB
EXTERNAL QUALITY CONTROL: NORMAL
FLUAV+FLUBV AG SPEC QL IA: NORMAL
INT CON NEG: NORMAL
INT CON POS: NORMAL
SARS-COV+SARS-COV-2 AG RESP QL IA.RAPID: NEGATIVE

## 2022-05-22 PROCEDURE — 87804 INFLUENZA ASSAY W/OPTIC: CPT | Performed by: FAMILY MEDICINE

## 2022-05-22 PROCEDURE — 87426 SARSCOV CORONAVIRUS AG IA: CPT | Performed by: FAMILY MEDICINE

## 2022-05-22 PROCEDURE — 99204 OFFICE O/P NEW MOD 45 MIN: CPT | Mod: CS | Performed by: FAMILY MEDICINE

## 2022-05-22 RX ORDER — TRIAMCINOLONE ACETONIDE 40 MG/ML
60 INJECTION, SUSPENSION INTRA-ARTICULAR; INTRAMUSCULAR ONCE
Status: COMPLETED | OUTPATIENT
Start: 2022-05-22 | End: 2022-05-22

## 2022-05-22 RX ADMIN — TRIAMCINOLONE ACETONIDE 60 MG: 40 INJECTION, SUSPENSION INTRA-ARTICULAR; INTRAMUSCULAR at 14:06

## 2022-05-22 NOTE — PROGRESS NOTES
Chief Complaint:    Chief Complaint   Patient presents with   • Sinusitis   • Pharyngitis       History of Present Illness:    Wife present. Symptoms x 4 days; has rhinorrhea, post-nasal drainage, sore throat, and cough. No fever or purulent mucus. Used Sudafed for symptoms with some help.      Past Medical History:    Past Medical History:   Diagnosis Date   • Arthritis     knees- OA generalized   • Bowel habit changes 03/26/2019    constipation   • G6PD deficiency    • Gout    • Hard of hearing    • History of anemia    • Kidney stones    • Pain 06/02/2020    back,knees, pain scale 3-4/10   • Sleep apnea     didn't tolerate CPAP   • Snoring      Past Surgical History:    Past Surgical History:   Procedure Laterality Date   • PB LAMINOTOMY,LUMBAR DISK,1 INTRSP  6/10/2020    Procedure: LAMINECTOMY, SPINE, LUMBAR, WITH DISCECTOMY-L2-3 AND RIGHT L3-5 DECOMPRESSIVE LAMI;  Surgeon: Alexandra Esposito M.D.;  Location: [facility];  Service: Neurosurgery   • FORAMINOTOMY Bilateral 6/10/2020    Procedure: FORAMINOTOMY, SPINE;  Surgeon: Alexandra Esposito M.D.;  Location: [facility];  Service: Neurosurgery   • CYSTOSCOPY  5/1/2019    Procedure: CYSTOSCOPY - FLEXIBLE;  Surgeon: Sarath Ayon M.D.;  Location: [facility];  Service: Urology   • URETHROPLASTY  5/1/2019    Procedure: URETHROPLASTY - EXCISION AND PRIMARY ANASTAMOSIS;  Surgeon: Sarath Ayon M.D.;  Location: [facility];  Service: Urology   • ASPIRATION BLADDER INSERT SUPRAPUBIC CATHETER N/A 4/5/2019    Procedure: SUPRAPUBIC CATH PLACEMENT;  Surgeon: Sarath Ayon M.D.;  Location: [facility];  Service: Urology   • CYSTOSCOPY N/A 4/5/2019    Procedure: CYSTOSCOPY- FOR CYSTOGRAM;  Surgeon: Sarath Ayon M.D.;  Location: [facility];  Service: Urology   • CYSTOSCOPY  11/14/2018    Procedure: CYSTOSCOPY;  Surgeon: Wan Zelaya M.D.;  Location: [facility];  Service: Urology   •  URETHRAL DILATATION  11/14/2018    Procedure: URETHRAL DILATATION - FOR URETHRAL STRICTURE(((((((((((not done))))))))))))));  Surgeon: Wan Zelaya M.D.;  Location: [facility];  Service: Urology   • URETHROTOMY  11/14/2018    Procedure: URETHROTOMY;  Surgeon: Wan Zelaya M.D.;  Location: [facility];  Service: Urology   • BLADDER NECK CONTRACTURE EXICISION  11/14/2018    Procedure: BLADDER NECK CONTRACTURE EXICISION;  Surgeon: Wan Zelaya M.D.;  Location: [facility];  Service: Urology   • CARPAL TUNNEL ENDOSCOPIC Right 12/30/2016    Procedure: CARPAL TUNNEL ENDOSCOPIC;  Surgeon: Dragan Cohen M.D.;  Location: [facility];  Service:    • LUMBAR DECOMPRESSION  11/16/2009    Performed by G[facility]EZ at [facility]   • KNEE ARTHROSCOPY  3/19/2009    Performed by TONYA EWING at [facility]   • MENISCECTOMY, KNEE, MEDIAL  3/19/2009    Performed by TONYA EWING at [facility]   • CARPAL TUNNEL ENDOSCOPIC Left    • KNEE ARTHROSCOPY Left     x2   • KNEE ARTHROSCOPY Right     x 3     Social History:    Social History     Socioeconomic History   • Marital status:      Spouse name: Not on file   • Number of children: Not on file   • Years of education: Not on file   • Highest education level: Not on file   Occupational History   • Not on file   Tobacco Use   • Smoking status: Current Every Day Smoker     Types: Cigars   • Smokeless tobacco: Never Used   Vaping Use   • Vaping Use: Never used   Substance and Sexual Activity   • Alcohol use: Yes     Comment: reports 2/month   • Drug use: Yes     Comment: marijuana   • Sexual activity: Not on file   Other Topics Concern   • Not on file   Social History Narrative   • Not on file     Social Determinants of Health     Financial Resource Strain: Not on file   Food Insecurity: Not on file   Transportation Needs: Not on file   Physical Activity: Not on file  "  Stress: Not on file   Social Connections: Not on file   Intimate Partner Violence: Not on file   Housing Stability: Not on file     Family History:    Family History   Problem Relation Age of Onset   • Heart Disease Mother    • Diabetes Mother    • Hypertension Mother      Medications:    Current Outpatient Medications on File Prior to Visit   Medication Sig Dispense Refill   • amoxicillin (AMOXIL) 500 MG Cap Take 1 Capsule by mouth 1 time a day as needed. Take 4 capsules by mouth 1 hour before dental procedure. 4 Capsule 2   • methocarbamol (ROBAXIN) 750 MG Tab Take 1 Tab by mouth every 8 hours as needed. 90 Tab 0   • allopurinol (ZYLOPRIM) 300 MG Tab Take 300 mg by mouth every day. Indications: Primary Gout     • gabapentin (NEURONTIN) 300 MG Cap Take 900 mg by mouth 2 Times a Day. Indications: Neuropathic Pain     • Pyridoxine HCl (VITAMIN B-6) 25 MG Tab Take 1 Tab by mouth every day.     • TURMERIC PO Take 1,300 mg by mouth every day. OTC     • quiNINE 324 MG capsule Take 324 mg by mouth 1 time daily as needed (For cramps).       No current facility-administered medications on file prior to visit.     Allergies:    Allergies   Allergen Reactions   • Aspirin      Patient has a G6PD deficiency - can't have aspirin       Vitals:    Vitals:    05/22/22 1203   BP: 128/80   BP Location: Right arm   Patient Position: Sitting   BP Cuff Size: Adult   Pulse: 89   Resp: 14   Temp: 36.3 °C (97.3 °F)   TempSrc: Temporal   SpO2: 97%   Weight: 112 kg (246 lb)   Height: 1.88 m (6' 2\")       Physical Exam:    Constitutional: Vital signs reviewed. Appears well-developed and well-nourished. No acute distress.   Eyes: Sclera white, conjunctivae clear.   ENT: External ears normal. External auditory canals normal without discharge. TMs translucent and non-bulging. Hearing normal. Lips/teeth are normal. Oral mucosa pink and moist. Posterior pharynx: WNL.  Neck: Neck supple.   Cardiovascular: Regular rate and rhythm. No " murmur.  Pulmonary/Chest: Respirations non-labored. Clear to auscultation bilaterally.  Musculoskeletal: Normal gait. No muscular atrophy or weakness.  Neurological: Alert and oriented to person, place, and time. Muscle tone normal. Coordination normal.   Skin: No rashes or lesions. Warm, dry, normal turgor.  Psychiatric: Normal mood and affect. Behavior is normal. Judgment and thought content normal.       Diagnostics:    POCT Influenza A/B  Order: [id number]   Status: Final result     Visible to patient: No (scheduled for 2022 11:12 AM)     Next appt: None     Dx: Flu-like symptoms     0 Result Notes    Component  1:12 PM    Rapid Influenza A-B neg    Internal Control Positive Valid    Internal Control Negative Valid    Resulting Agency [email or web address]              Specimen Collected: 22  1:12 PM Last Resulted: 22  1:12 PM              POCT SARS-COV Antigen FREDA (Symptomatic only)  Order: [id number]   Status: Final result     Visible to patient: No (scheduled for 2022 11:12 AM)     Next appt: None     Dx: Encounter for screening for COVID-19     0 Result Notes    Component Ref Range & Units  1:12 PM    Internal   Valid    SARS-COV ANTIGEN FREDA Negative, Indeterminate, None Detected, Valid, Invalid, Pass Negative    Resulting Agency  [facility] [employer]              Specimen Collected: 22  1:12 PM Last Resulted: 22  1:12 PM                Medical Decision Makin. Flu-like symptoms  - POCT Influenza A/B    2. Encounter for screening for COVID-19  - POCT SARS-COV Antigen FREDA (Symptomatic only)    3. Allergic symptoms, initial encounter  - triamcinolone acetonide (KENALOG-40) injection 60 mg      Discussed with them DDX, management options, and risks, benefits, and alternatives to treatment plan agreed upon.    Wife present. Symptoms x 4 days; has rhinorrhea, post-nasal drainage, sore throat, and cough. No fever or purulent mucus. Used Sudafed for symptoms with some  help.    Rapid Flu test is negative.    POC Covid test is negative.    Lean towards allergies as cause of symptoms.    May use OTC Claritin as needed for symptoms.    Agreeable to medication given for allergies treatment.    Discussed expected course of duration, time for improvement, and to seek follow-up in Emergency Room, urgent care, or with PCP if getting worse at any time or not improving within expected time frame.

## 2022-05-23 LAB — 25(OH)D3 SERPL-MCNC: 42 NG/ML (ref 30–80)

## 2023-12-01 ENCOUNTER — HOSPITAL ENCOUNTER (OUTPATIENT)
Dept: LAB | Facility: MEDICAL CENTER | Age: 63
End: 2023-12-01
Attending: INTERNAL MEDICINE
Payer: COMMERCIAL

## 2023-12-01 PROCEDURE — 87086 URINE CULTURE/COLONY COUNT: CPT

## 2023-12-01 PROCEDURE — 81001 URINALYSIS AUTO W/SCOPE: CPT

## 2023-12-02 LAB
APPEARANCE UR: CLEAR
BACTERIA #/AREA URNS HPF: NEGATIVE /HPF
BILIRUB UR QL STRIP.AUTO: NEGATIVE
COLOR UR: YELLOW
EPI CELLS #/AREA URNS HPF: NEGATIVE /HPF
GLUCOSE UR STRIP.AUTO-MCNC: NEGATIVE MG/DL
HYALINE CASTS #/AREA URNS LPF: ABNORMAL /LPF
KETONES UR STRIP.AUTO-MCNC: NEGATIVE MG/DL
LEUKOCYTE ESTERASE UR QL STRIP.AUTO: ABNORMAL
MICRO URNS: ABNORMAL
NITRITE UR QL STRIP.AUTO: NEGATIVE
PH UR STRIP.AUTO: 6 [PH] (ref 5–8)
PROT UR QL STRIP: NEGATIVE MG/DL
RBC # URNS HPF: ABNORMAL /HPF
RBC UR QL AUTO: NEGATIVE
SP GR UR STRIP.AUTO: 1.02
UROBILINOGEN UR STRIP.AUTO-MCNC: 0.2 MG/DL
WBC #/AREA URNS HPF: ABNORMAL /HPF

## 2023-12-03 LAB
BACTERIA UR CULT: NORMAL
SIGNIFICANT IND 70042: NORMAL
SITE SITE: NORMAL
SOURCE SOURCE: NORMAL

## 2024-02-17 ENCOUNTER — OFFICE VISIT (OUTPATIENT)
Dept: URGENT CARE | Facility: PHYSICIAN GROUP | Age: 64
End: 2024-02-17
Payer: COMMERCIAL

## 2024-02-17 VITALS
DIASTOLIC BLOOD PRESSURE: 82 MMHG | WEIGHT: 225 LBS | RESPIRATION RATE: 16 BRPM | SYSTOLIC BLOOD PRESSURE: 118 MMHG | TEMPERATURE: 97.9 F | BODY MASS INDEX: 29.69 KG/M2 | OXYGEN SATURATION: 100 % | HEART RATE: 75 BPM

## 2024-02-17 DIAGNOSIS — B02.9 HERPES ZOSTER WITHOUT COMPLICATION: ICD-10-CM

## 2024-02-17 PROBLEM — N20.9 UROLITH: Status: ACTIVE | Noted: 2018-02-26

## 2024-02-17 PROBLEM — M19.90 ARTHRITIS: Status: ACTIVE | Noted: 2024-02-17

## 2024-02-17 PROBLEM — N35.011 TRAUMATIC BULBOUS URETHRAL STRICTURE: Status: ACTIVE | Noted: 2019-02-05

## 2024-02-17 PROBLEM — N35.112: Status: ACTIVE | Noted: 2019-04-11

## 2024-02-17 PROBLEM — N39.0 URINARY TRACT INFECTIOUS DISEASE: Status: ACTIVE | Noted: 2019-03-25

## 2024-02-17 PROCEDURE — 3074F SYST BP LT 130 MM HG: CPT | Performed by: NURSE PRACTITIONER

## 2024-02-17 PROCEDURE — 99213 OFFICE O/P EST LOW 20 MIN: CPT | Performed by: NURSE PRACTITIONER

## 2024-02-17 PROCEDURE — 3079F DIAST BP 80-89 MM HG: CPT | Performed by: NURSE PRACTITIONER

## 2024-02-17 RX ORDER — VITAMIN E 268 MG
CAPSULE ORAL
COMMUNITY

## 2024-02-17 RX ORDER — VALACYCLOVIR HYDROCHLORIDE 1 G/1
1000 TABLET, FILM COATED ORAL 3 TIMES DAILY
Qty: 21 TABLET | Refills: 0 | Status: SHIPPED | OUTPATIENT
Start: 2024-02-17 | End: 2024-02-24

## 2024-02-17 NOTE — PROGRESS NOTES
Chief Complaint   Patient presents with    Rash     Rash on left side- noticed it Tuesday. Now it's moved to back.        HISTORY OF PRESENT ILLNESS: Patient is a pleasant 63 y.o. male who presents to urgent care today with concerns of a rash.  He reports he has had a rash to his left low back and left abdomen over the past 3 to 4 days.  Rash started in his abdomen, new rash to his low back started yesterday.  The rash is pruritic.  He otherwise feels well denies any fever, joint pain.  Denies others with similar rash.    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 21 Tablet 0    allopurinol (ZYLOPRIM) 300 MG Tab Take 300 mg by mouth every day. Indications: Primary Gout      quiNINE 324 MG capsule Take 324 mg by mouth 1 time daily as needed (For cramps).      vitamin e (VITAMIN E) 400 UNIT Cap vitamin E (dl, acetate) 180 mg (400 unit) capsule       No current Epic-ordered facility-administered medications on file.       Past Medical History:   Diagnosis Date    Arthritis     knees- OA generalized    Bowel habit changes 03/26/2019    constipation    G6PD deficiency     Gout     Hard of hearing     History of anemia     Kidney stones     Pain 06/02/2020    back,knees, pain scale 3-4/10    Sleep apnea     didn't tolerate CPAP    Snoring        Social History     Tobacco Use    Smoking status: Some Days     Types: Cigars    Smokeless tobacco: Never   Vaping Use    Vaping Use: Never  used   Substance Use Topics    Alcohol use: Yes     Comment: reports 2/month    Drug use: Yes     Comment: marijuana       Family Status   Relation Name Status    Mo  Alive    Fa          gun shot     Family History   Problem Relation Age of Onset    Heart Disease Mother     Diabetes Mother     Hypertension Mother        ROS:  Review of Systems   Constitutional: Negative for fever, chills, weight loss, malaise, and fatigue.   HENT: Negative for ear pain, nosebleeds, congestion, sore throat and neck pain.    Eyes: Negative for vision changes.   Neuro: Negative for headache, sensory changes, weakness, seizure, LOC.   Cardiovascular: Negative for chest pain, palpitations, orthopnea and leg swelling.   Respiratory: Negative for cough, sputum production, shortness of breath and wheezing.   Gastrointestinal: Negative for abdominal pain, nausea, vomiting or diarrhea.   Genitourinary: Negative for dysuria, urgency and frequency.  Musculoskeletal: Negative for falls, neck pain, back pain, joint pain, myalgias.   Skin: Positive for for rash.  Negative for diaphoresis.     Exam:  /82   Pulse 75   Temp 36.6 °C (97.9 °F) (Temporal)   Resp 16   Wt 102 kg (225 lb)   SpO2 100%   General: well-nourished, well-developed male in NAD  Head: normocephalic, atraumatic  Eyes: PERRLA, no conjunctival injection, acuity grossly intact, lids normal.  Ears: normal shape and symmetry, no tenderness, no discharge. External canals are without any significant edema or erythema. Tympanic membranes are without any inflammation, no effusion. Gross auditory acuity is intact.  Nose: symmetrical without tenderness, no discharge.  Mouth/Throat: reasonable hygiene, no erythema, exudates or tonsillar enlargement.  Neck: no masses, range of motion within normal limits, no tracheal deviation. No obvious thyroid enlargement.   Lymph: no cervical adenopathy. No supraclavicular adenopathy.   Neuro: alert and oriented. Cranial nerves 1-12  grossly intact. No sensory deficit.   Cardiovascular: regular rate and rhythm. No edema.  Pulmonary: no distress. Chest is symmetrical with respiration, no wheezes, crackles, or rhonchi.   Musculoskeletal: no clubbing, appropriate muscle tone, gait is stable.  Skin: warm, dry, intact, no clubbing, no cyanosis.  Clustered raised rash noted to left low back and left anterior abdomen.  Both in linear pattern.  Psych: appropriate mood, affect, judgement.         Assessment/Plan:  1. Herpes zoster without complication  valacyclovir (VALTREX) 1 GM Tab          Presentation consistent with herpes zoster.  Valtrex as directed.  OTC Calamine lotion and capsaicin cream for symptom relief.  Supportive care, differential diagnoses, and indications for immediate follow-up discussed with patient.   Pathogenesis of diagnosis discussed including typical length and natural progression.   Instructed to return to clinic or nearest emergency department for any change in condition, further concerns, or worsening of symptoms.  Patient states understanding of the plan of care and discharge instructions.  Instructed to make an appointment, for follow up, with his primary care provider.        Please note that this dictation was created using voice recognition software. I have made every reasonable attempt to correct obvious errors, but I expect that there are errors of grammar and possibly content that I did not discover before finalizing the note.      SUSI Amador.

## 2024-03-20 ENCOUNTER — HOSPITAL ENCOUNTER (OUTPATIENT)
Dept: LAB | Facility: MEDICAL CENTER | Age: 64
End: 2024-03-20
Attending: INTERNAL MEDICINE
Payer: COMMERCIAL

## 2024-03-20 LAB
ANION GAP SERPL CALC-SCNC: 12 MMOL/L (ref 7–16)
BASOPHILS # BLD AUTO: 0.4 % (ref 0–1.8)
BASOPHILS # BLD: 0.02 K/UL (ref 0–0.12)
BUN SERPL-MCNC: 16 MG/DL (ref 8–22)
CALCIUM SERPL-MCNC: 9 MG/DL (ref 8.5–10.5)
CHLORIDE SERPL-SCNC: 105 MMOL/L (ref 96–112)
CHOLEST SERPL-MCNC: 145 MG/DL (ref 100–199)
CO2 SERPL-SCNC: 24 MMOL/L (ref 20–33)
CREAT SERPL-MCNC: 0.99 MG/DL (ref 0.5–1.4)
EOSINOPHIL # BLD AUTO: 0.13 K/UL (ref 0–0.51)
EOSINOPHIL NFR BLD: 2.3 % (ref 0–6.9)
ERYTHROCYTE [DISTWIDTH] IN BLOOD BY AUTOMATED COUNT: 40.5 FL (ref 35.9–50)
EST. AVERAGE GLUCOSE BLD GHB EST-MCNC: 100 MG/DL
FASTING STATUS PATIENT QL REPORTED: NORMAL
GFR SERPLBLD CREATININE-BSD FMLA CKD-EPI: 85 ML/MIN/1.73 M 2
GLUCOSE SERPL-MCNC: 96 MG/DL (ref 65–99)
HBA1C MFR BLD: 5.1 % (ref 4–5.6)
HCT VFR BLD AUTO: 43.6 % (ref 42–52)
HCV AB SER QL: NORMAL
HDLC SERPL-MCNC: 65 MG/DL
HGB BLD-MCNC: 14.2 G/DL (ref 14–18)
IMM GRANULOCYTES # BLD AUTO: 0.01 K/UL (ref 0–0.11)
IMM GRANULOCYTES NFR BLD AUTO: 0.2 % (ref 0–0.9)
LDLC SERPL CALC-MCNC: 65 MG/DL
LYMPHOCYTES # BLD AUTO: 2.46 K/UL (ref 1–4.8)
LYMPHOCYTES NFR BLD: 43.7 % (ref 22–41)
MCH RBC QN AUTO: 28.6 PG (ref 27–33)
MCHC RBC AUTO-ENTMCNC: 32.6 G/DL (ref 32.3–36.5)
MCV RBC AUTO: 87.9 FL (ref 81.4–97.8)
MONOCYTES # BLD AUTO: 0.57 K/UL (ref 0–0.85)
MONOCYTES NFR BLD AUTO: 10.1 % (ref 0–13.4)
NEUTROPHILS # BLD AUTO: 2.44 K/UL (ref 1.82–7.42)
NEUTROPHILS NFR BLD: 43.3 % (ref 44–72)
NRBC # BLD AUTO: 0 K/UL
NRBC BLD-RTO: 0 /100 WBC (ref 0–0.2)
PLATELET # BLD AUTO: 235 K/UL (ref 164–446)
PMV BLD AUTO: 10.9 FL (ref 9–12.9)
POTASSIUM SERPL-SCNC: 3.9 MMOL/L (ref 3.6–5.5)
PSA SERPL-MCNC: 4.69 NG/ML (ref 0–4)
RBC # BLD AUTO: 4.96 M/UL (ref 4.7–6.1)
SODIUM SERPL-SCNC: 141 MMOL/L (ref 135–145)
TRIGL SERPL-MCNC: 73 MG/DL (ref 0–149)
WBC # BLD AUTO: 5.6 K/UL (ref 4.8–10.8)

## 2024-03-20 PROCEDURE — 86803 HEPATITIS C AB TEST: CPT

## 2024-03-20 PROCEDURE — 36415 COLL VENOUS BLD VENIPUNCTURE: CPT

## 2024-03-20 PROCEDURE — 83036 HEMOGLOBIN GLYCOSYLATED A1C: CPT

## 2024-03-20 PROCEDURE — 80048 BASIC METABOLIC PNL TOTAL CA: CPT

## 2024-03-20 PROCEDURE — 84153 ASSAY OF PSA TOTAL: CPT

## 2024-03-20 PROCEDURE — 80061 LIPID PANEL: CPT

## 2024-03-20 PROCEDURE — 85025 COMPLETE CBC W/AUTO DIFF WBC: CPT

## 2024-11-08 ENCOUNTER — HOSPITAL ENCOUNTER (OUTPATIENT)
Dept: LAB | Facility: MEDICAL CENTER | Age: 64
End: 2024-11-08
Attending: UROLOGY
Payer: COMMERCIAL

## 2024-11-08 LAB
BUN SERPL-MCNC: 11 MG/DL (ref 8–22)
CREAT SERPL-MCNC: 0.96 MG/DL (ref 0.5–1.4)
GFR SERPLBLD CREATININE-BSD FMLA CKD-EPI: 88 ML/MIN/1.73 M 2

## 2024-11-08 PROCEDURE — 36415 COLL VENOUS BLD VENIPUNCTURE: CPT

## 2024-11-08 PROCEDURE — 84520 ASSAY OF UREA NITROGEN: CPT

## 2024-11-08 PROCEDURE — 82565 ASSAY OF CREATININE: CPT

## 2025-03-27 ENCOUNTER — HOSPITAL ENCOUNTER (OUTPATIENT)
Dept: LAB | Facility: MEDICAL CENTER | Age: 65
End: 2025-03-27
Attending: INTERNAL MEDICINE
Payer: COMMERCIAL

## 2025-03-27 LAB
BASOPHILS # BLD AUTO: 0.7 % (ref 0–1.8)
BASOPHILS # BLD: 0.04 K/UL (ref 0–0.12)
EOSINOPHIL # BLD AUTO: 0.08 K/UL (ref 0–0.51)
EOSINOPHIL NFR BLD: 1.4 % (ref 0–6.9)
ERYTHROCYTE [DISTWIDTH] IN BLOOD BY AUTOMATED COUNT: 44.2 FL (ref 35.9–50)
EST. AVERAGE GLUCOSE BLD GHB EST-MCNC: 100 MG/DL
HBA1C MFR BLD: 5.1 % (ref 4–5.6)
HCT VFR BLD AUTO: 45.5 % (ref 42–52)
HGB BLD-MCNC: 14.4 G/DL (ref 14–18)
IMM GRANULOCYTES # BLD AUTO: 0.03 K/UL (ref 0–0.11)
IMM GRANULOCYTES NFR BLD AUTO: 0.5 % (ref 0–0.9)
LYMPHOCYTES # BLD AUTO: 2.29 K/UL (ref 1–4.8)
LYMPHOCYTES NFR BLD: 40.2 % (ref 22–41)
MCH RBC QN AUTO: 29 PG (ref 27–33)
MCHC RBC AUTO-ENTMCNC: 31.6 G/DL (ref 32.3–36.5)
MCV RBC AUTO: 91.7 FL (ref 81.4–97.8)
MONOCYTES # BLD AUTO: 0.52 K/UL (ref 0–0.85)
MONOCYTES NFR BLD AUTO: 9.1 % (ref 0–13.4)
NEUTROPHILS # BLD AUTO: 2.73 K/UL (ref 1.82–7.42)
NEUTROPHILS NFR BLD: 48.1 % (ref 44–72)
NRBC # BLD AUTO: 0 K/UL
NRBC BLD-RTO: 0 /100 WBC (ref 0–0.2)
PLATELET # BLD AUTO: 239 K/UL (ref 164–446)
PMV BLD AUTO: 10.9 FL (ref 9–12.9)
RBC # BLD AUTO: 4.96 M/UL (ref 4.7–6.1)
WBC # BLD AUTO: 5.7 K/UL (ref 4.8–10.8)

## 2025-03-27 PROCEDURE — 80061 LIPID PANEL: CPT

## 2025-03-27 PROCEDURE — 84550 ASSAY OF BLOOD/URIC ACID: CPT

## 2025-03-27 PROCEDURE — 85025 COMPLETE CBC W/AUTO DIFF WBC: CPT

## 2025-03-27 PROCEDURE — 83036 HEMOGLOBIN GLYCOSYLATED A1C: CPT

## 2025-03-27 PROCEDURE — 80053 COMPREHEN METABOLIC PANEL: CPT

## 2025-03-27 PROCEDURE — 36415 COLL VENOUS BLD VENIPUNCTURE: CPT

## 2025-03-28 LAB
ALBUMIN SERPL BCP-MCNC: 4.2 G/DL (ref 3.2–4.9)
ALBUMIN/GLOB SERPL: 1.6 G/DL
ALP SERPL-CCNC: 78 U/L (ref 30–99)
ALT SERPL-CCNC: 35 U/L (ref 2–50)
ANION GAP SERPL CALC-SCNC: 9 MMOL/L (ref 7–16)
AST SERPL-CCNC: 41 U/L (ref 12–45)
BILIRUB SERPL-MCNC: 0.8 MG/DL (ref 0.1–1.5)
BUN SERPL-MCNC: 18 MG/DL (ref 8–22)
CALCIUM ALBUM COR SERPL-MCNC: 8.6 MG/DL (ref 8.5–10.5)
CALCIUM SERPL-MCNC: 8.8 MG/DL (ref 8.5–10.5)
CHLORIDE SERPL-SCNC: 106 MMOL/L (ref 96–112)
CHOLEST SERPL-MCNC: 167 MG/DL (ref 100–199)
CO2 SERPL-SCNC: 26 MMOL/L (ref 20–33)
CREAT SERPL-MCNC: 1.12 MG/DL (ref 0.5–1.4)
FASTING STATUS PATIENT QL REPORTED: NORMAL
GFR SERPLBLD CREATININE-BSD FMLA CKD-EPI: 73 ML/MIN/1.73 M 2
GLOBULIN SER CALC-MCNC: 2.7 G/DL (ref 1.9–3.5)
GLUCOSE SERPL-MCNC: 97 MG/DL (ref 65–99)
HDLC SERPL-MCNC: 63 MG/DL
LDLC SERPL CALC-MCNC: 91 MG/DL
POTASSIUM SERPL-SCNC: 4.6 MMOL/L (ref 3.6–5.5)
PROT SERPL-MCNC: 6.9 G/DL (ref 6–8.2)
SODIUM SERPL-SCNC: 141 MMOL/L (ref 135–145)
TRIGL SERPL-MCNC: 65 MG/DL (ref 0–149)
URATE SERPL-MCNC: 4.8 MG/DL (ref 2.5–8.3)

## 2025-07-19 ENCOUNTER — APPOINTMENT (OUTPATIENT)
Dept: RADIOLOGY | Facility: MEDICAL CENTER | Age: 65
End: 2025-07-19
Attending: EMERGENCY MEDICINE
Payer: COMMERCIAL

## 2025-07-19 ENCOUNTER — HOSPITAL ENCOUNTER (EMERGENCY)
Facility: MEDICAL CENTER | Age: 65
End: 2025-07-19
Attending: EMERGENCY MEDICINE
Payer: COMMERCIAL

## 2025-07-19 VITALS
TEMPERATURE: 97.2 F | OXYGEN SATURATION: 98 % | HEART RATE: 64 BPM | DIASTOLIC BLOOD PRESSURE: 88 MMHG | HEIGHT: 73 IN | SYSTOLIC BLOOD PRESSURE: 161 MMHG | WEIGHT: 232.81 LBS | BODY MASS INDEX: 30.85 KG/M2 | RESPIRATION RATE: 16 BRPM

## 2025-07-19 DIAGNOSIS — N39.0 ACUTE UTI: ICD-10-CM

## 2025-07-19 DIAGNOSIS — K82.8 GALLBLADDER SLUDGE: ICD-10-CM

## 2025-07-19 DIAGNOSIS — R10.9 FLANK PAIN: Primary | ICD-10-CM

## 2025-07-19 DIAGNOSIS — R74.8 ELEVATED LIPASE: ICD-10-CM

## 2025-07-19 LAB
ALBUMIN SERPL BCP-MCNC: 4.1 G/DL (ref 3.2–4.9)
ALBUMIN/GLOB SERPL: 1.5 G/DL
ALP SERPL-CCNC: 87 U/L (ref 30–99)
ALT SERPL-CCNC: 27 U/L (ref 2–50)
ANION GAP SERPL CALC-SCNC: 9 MMOL/L (ref 7–16)
APPEARANCE UR: ABNORMAL
AST SERPL-CCNC: 32 U/L (ref 12–45)
BACTERIA #/AREA URNS HPF: ABNORMAL /HPF
BASOPHILS # BLD AUTO: 0.4 % (ref 0–1.8)
BASOPHILS # BLD: 0.02 K/UL (ref 0–0.12)
BILIRUB SERPL-MCNC: 0.8 MG/DL (ref 0.1–1.5)
BILIRUB UR QL STRIP.AUTO: NEGATIVE
BUN SERPL-MCNC: 12 MG/DL (ref 8–22)
CALCIUM ALBUM COR SERPL-MCNC: 8.8 MG/DL (ref 8.5–10.5)
CALCIUM SERPL-MCNC: 8.9 MG/DL (ref 8.5–10.5)
CASTS URNS QL MICRO: ABNORMAL /LPF (ref 0–2)
CHLORIDE SERPL-SCNC: 106 MMOL/L (ref 96–112)
CO2 SERPL-SCNC: 24 MMOL/L (ref 20–33)
COLOR UR: ABNORMAL
CREAT SERPL-MCNC: 1.17 MG/DL (ref 0.5–1.4)
EOSINOPHIL # BLD AUTO: 0.05 K/UL (ref 0–0.51)
EOSINOPHIL NFR BLD: 1 % (ref 0–6.9)
EPITHELIAL CELLS 1715: ABNORMAL /HPF (ref 0–5)
ERYTHROCYTE [DISTWIDTH] IN BLOOD BY AUTOMATED COUNT: 41.8 FL (ref 35.9–50)
GFR SERPLBLD CREATININE-BSD FMLA CKD-EPI: 69 ML/MIN/1.73 M 2
GLOBULIN SER CALC-MCNC: 2.7 G/DL (ref 1.9–3.5)
GLUCOSE SERPL-MCNC: 87 MG/DL (ref 65–99)
GLUCOSE UR STRIP.AUTO-MCNC: NEGATIVE MG/DL
HCT VFR BLD AUTO: 43 % (ref 42–52)
HGB BLD-MCNC: 13.9 G/DL (ref 14–18)
IMM GRANULOCYTES # BLD AUTO: 0.01 K/UL (ref 0–0.11)
IMM GRANULOCYTES NFR BLD AUTO: 0.2 % (ref 0–0.9)
KETONES UR STRIP.AUTO-MCNC: ABNORMAL MG/DL
LACTATE SERPL-SCNC: 0.9 MMOL/L (ref 0.5–2)
LEUKOCYTE ESTERASE UR QL STRIP.AUTO: ABNORMAL
LIPASE SERPL-CCNC: 197 U/L (ref 11–82)
LYMPHOCYTES # BLD AUTO: 2.22 K/UL (ref 1–4.8)
LYMPHOCYTES NFR BLD: 43.3 % (ref 22–41)
MCH RBC QN AUTO: 29.4 PG (ref 27–33)
MCHC RBC AUTO-ENTMCNC: 32.3 G/DL (ref 32.3–36.5)
MCV RBC AUTO: 90.9 FL (ref 81.4–97.8)
MICRO URNS: ABNORMAL
MONOCYTES # BLD AUTO: 0.57 K/UL (ref 0–0.85)
MONOCYTES NFR BLD AUTO: 11.1 % (ref 0–13.4)
NEUTROPHILS # BLD AUTO: 2.26 K/UL (ref 1.82–7.42)
NEUTROPHILS NFR BLD: 44 % (ref 44–72)
NITRITE UR QL STRIP.AUTO: NEGATIVE
NRBC # BLD AUTO: 0 K/UL
NRBC BLD-RTO: 0 /100 WBC (ref 0–0.2)
PH UR STRIP.AUTO: 5.5 [PH] (ref 5–8)
PLATELET # BLD AUTO: 283 K/UL (ref 164–446)
PMV BLD AUTO: 10.1 FL (ref 9–12.9)
POTASSIUM SERPL-SCNC: 4.3 MMOL/L (ref 3.6–5.5)
PROT SERPL-MCNC: 6.8 G/DL (ref 6–8.2)
PROT UR QL STRIP: 30 MG/DL
RBC # BLD AUTO: 4.73 M/UL (ref 4.7–6.1)
RBC # URNS HPF: ABNORMAL /HPF
RBC UR QL AUTO: NEGATIVE
SODIUM SERPL-SCNC: 139 MMOL/L (ref 135–145)
SP GR UR STRIP.AUTO: 1.03
UROBILINOGEN UR STRIP.AUTO-MCNC: 1 EU/DL
WBC # BLD AUTO: 5.1 K/UL (ref 4.8–10.8)
WBC #/AREA URNS HPF: >100 /HPF

## 2025-07-19 PROCEDURE — 99285 EMERGENCY DEPT VISIT HI MDM: CPT

## 2025-07-19 PROCEDURE — 83690 ASSAY OF LIPASE: CPT

## 2025-07-19 PROCEDURE — 700111 HCHG RX REV CODE 636 W/ 250 OVERRIDE (IP): Mod: JZ | Performed by: EMERGENCY MEDICINE

## 2025-07-19 PROCEDURE — 36415 COLL VENOUS BLD VENIPUNCTURE: CPT

## 2025-07-19 PROCEDURE — 83605 ASSAY OF LACTIC ACID: CPT

## 2025-07-19 PROCEDURE — 74177 CT ABD & PELVIS W/CONTRAST: CPT

## 2025-07-19 PROCEDURE — 96374 THER/PROPH/DIAG INJ IV PUSH: CPT

## 2025-07-19 PROCEDURE — 81001 URINALYSIS AUTO W/SCOPE: CPT

## 2025-07-19 PROCEDURE — 96375 TX/PRO/DX INJ NEW DRUG ADDON: CPT

## 2025-07-19 PROCEDURE — 700102 HCHG RX REV CODE 250 W/ 637 OVERRIDE(OP): Performed by: EMERGENCY MEDICINE

## 2025-07-19 PROCEDURE — 87186 SC STD MICRODIL/AGAR DIL: CPT

## 2025-07-19 PROCEDURE — 85025 COMPLETE CBC W/AUTO DIFF WBC: CPT

## 2025-07-19 PROCEDURE — 87086 URINE CULTURE/COLONY COUNT: CPT

## 2025-07-19 PROCEDURE — 76705 ECHO EXAM OF ABDOMEN: CPT

## 2025-07-19 PROCEDURE — 80053 COMPREHEN METABOLIC PANEL: CPT

## 2025-07-19 PROCEDURE — 87077 CULTURE AEROBIC IDENTIFY: CPT

## 2025-07-19 PROCEDURE — A9270 NON-COVERED ITEM OR SERVICE: HCPCS | Performed by: EMERGENCY MEDICINE

## 2025-07-19 PROCEDURE — 700117 HCHG RX CONTRAST REV CODE 255: Performed by: EMERGENCY MEDICINE

## 2025-07-19 RX ORDER — KETOROLAC TROMETHAMINE 15 MG/ML
15 INJECTION, SOLUTION INTRAMUSCULAR; INTRAVENOUS ONCE
Status: COMPLETED | OUTPATIENT
Start: 2025-07-19 | End: 2025-07-19

## 2025-07-19 RX ORDER — CEFAZOLIN 2 G/1
2 INJECTION, POWDER, FOR SOLUTION INTRAMUSCULAR; INTRAVENOUS ONCE
Status: COMPLETED | OUTPATIENT
Start: 2025-07-19 | End: 2025-07-19

## 2025-07-19 RX ORDER — SULFAMETHOXAZOLE AND TRIMETHOPRIM 800; 160 MG/1; MG/1
1 TABLET ORAL 2 TIMES DAILY
Qty: 14 TABLET | Refills: 0 | Status: ACTIVE | OUTPATIENT
Start: 2025-07-19 | End: 2025-07-19

## 2025-07-19 RX ORDER — METHOCARBAMOL 750 MG/1
750 TABLET, FILM COATED ORAL 4 TIMES DAILY
Qty: 12 TABLET | Refills: 0 | Status: SHIPPED | OUTPATIENT
Start: 2025-07-19 | End: 2025-07-22

## 2025-07-19 RX ORDER — MORPHINE SULFATE 4 MG/ML
4 INJECTION INTRAVENOUS ONCE
Status: COMPLETED | OUTPATIENT
Start: 2025-07-19 | End: 2025-07-19

## 2025-07-19 RX ORDER — IBUPROFEN 800 MG/1
800 TABLET, FILM COATED ORAL EVERY 8 HOURS PRN
Qty: 9 TABLET | Refills: 0 | Status: SHIPPED | OUTPATIENT
Start: 2025-07-19 | End: 2025-07-22

## 2025-07-19 RX ORDER — METHOCARBAMOL 750 MG/1
750 TABLET, FILM COATED ORAL 4 TIMES DAILY
Qty: 12 TABLET | Refills: 0 | Status: SHIPPED | OUTPATIENT
Start: 2025-07-19 | End: 2025-07-19

## 2025-07-19 RX ORDER — ONDANSETRON 2 MG/ML
4 INJECTION INTRAMUSCULAR; INTRAVENOUS ONCE
Status: COMPLETED | OUTPATIENT
Start: 2025-07-19 | End: 2025-07-19

## 2025-07-19 RX ORDER — SULFAMETHOXAZOLE AND TRIMETHOPRIM 800; 160 MG/1; MG/1
1 TABLET ORAL 2 TIMES DAILY
Qty: 14 TABLET | Refills: 0 | Status: ACTIVE | OUTPATIENT
Start: 2025-07-19 | End: 2025-07-23

## 2025-07-19 RX ORDER — IBUPROFEN 800 MG/1
800 TABLET, FILM COATED ORAL EVERY 8 HOURS PRN
Qty: 9 TABLET | Refills: 0 | Status: SHIPPED | OUTPATIENT
Start: 2025-07-19 | End: 2025-07-19

## 2025-07-19 RX ORDER — METHOCARBAMOL 500 MG/1
500 TABLET, FILM COATED ORAL ONCE
Status: COMPLETED | OUTPATIENT
Start: 2025-07-19 | End: 2025-07-19

## 2025-07-19 RX ADMIN — CEFAZOLIN 2 G: 2 INJECTION, POWDER, FOR SOLUTION INTRAVENOUS at 14:41

## 2025-07-19 RX ADMIN — KETOROLAC TROMETHAMINE 15 MG: 15 INJECTION, SOLUTION INTRAMUSCULAR; INTRAVENOUS at 16:15

## 2025-07-19 RX ADMIN — ONDANSETRON 4 MG: 2 INJECTION INTRAMUSCULAR; INTRAVENOUS at 13:17

## 2025-07-19 RX ADMIN — MORPHINE SULFATE 4 MG: 4 INJECTION INTRAVENOUS at 13:17

## 2025-07-19 RX ADMIN — METHOCARBAMOL 500 MG: 500 TABLET ORAL at 16:03

## 2025-07-19 RX ADMIN — IOHEXOL 100 ML: 350 INJECTION, SOLUTION INTRAVENOUS at 14:30

## 2025-07-19 ASSESSMENT — FIBROSIS 4 INDEX: FIB4 SCORE: 1.88

## 2025-07-19 NOTE — ED NOTES
Discharge instructions provided. Pt verbalized understanding of discharge instructions to follow up with urology and general surgery and to return to ER if condition worsens. Pt ambulated out of ER without difficulty.

## 2025-07-19 NOTE — DISCHARGE INSTRUCTIONS
Follow-up with your primary care physician early this coming week to recheck your lipase.    Follow-up with Dr. Ayon, your urologist, this week.  Please call for appointment.    Drink plenty of fluids to stay well-hydrated.    Return to the ER for any worsening pain in your right flank, for abdominal pain, nausea, vomiting, fevers over 100.4, shaking chills, diarrhea, blood in stool, rash or discoloration to the skin overlying the area of pain, blood in your urine, cloudy or foul-smelling urine, pain with urination, or for any concerns.

## 2025-07-19 NOTE — ED TRIAGE NOTES
"Pulse 65   Temp 36.2 °C (97.2 °F) (Temporal)   Resp 16   Ht 1.854 m (6' 1\")   Wt 106 kg (232 lb 12.9 oz)   SpO2 96%   BMI 30.71 kg/m²   Chief Complaint   Patient presents with    Flank Pain     R flank pain  Hx of \"stone in his urethra\"   unsure of kidney stones  did have to having surgery to removed them   thinking her was constipated and took mag citrate for this   was doing okay until about a week ago     denies fevers  N/V   just having pain      Comes in w/ wife  unsure for if this could be a flare of kidney stone  pain started about a week ago and continues   her for evaluation   "

## 2025-07-19 NOTE — ED PROVIDER NOTES
"ED Provider Note    CHIEF COMPLAINT  Chief Complaint   Patient presents with    Flank Pain     R flank pain  Hx of \"stone in his urethra\"   unsure of kidney stones  did have to having surgery to removed them   thinking her was constipated and took mag citrate for this   was doing okay until about a week ago     denies fevers  N/V   just having pain        EXTERNAL RECORDS REVIEWED  Inpatient Notes patient was admitted in 2018 for left flank, left abdomen and left lower back pain. Urology did a transurethral resection of the prostate.    Patient had a lipase of 332 6 years ago.  7 years ago he had a lipase of 91.    HPI/ROS  LIMITATION TO HISTORY   Select: : None  OUTSIDE HISTORIAN(S):  Significant other at bedside and said pain started about a week and a half ago.    Ra Smith is a 65 y.o. male who presents to the ER with complaint of right flank pain which began about a week and a half ago.  Pain has been constant unrelenting.  He gets worse when he moves, more so when he laterally bends to the right.  If he laterally bends to the left he has some pain but not as much as when he bends to the right.  He says the pain also gets worse when he eats.  He says that there is no particular food that triggers the pain.  He says he could \"even eat a plum and that could make the pain worse.\"  He does not have any abdominal pain.  He thought he was constipated so he took some magnesium citrate, had a bowel movement, and that did seem to relieve the pain a little bit.  However, he says sometimes the pain does get worse when he tries to have a bowel movement.  No pain with urination.  No cloudy or foul-smelling urine.  No blood in urine.  No fevers or chills.  No cough.  Pain does not get worse when he takes a deep breath.  No trauma or injury.  No recent heavy lifting.  No history of similar pain in the past.  No nausea or vomiting.  No diarrhea.  He does not feel sick or ill.  He has taken some ibuprofen and Aleve " "with mild improvement.      PAST MEDICAL HISTORY   has a past medical history of Arthritis, Bowel habit changes (03/26/2019), G6PD deficiency, Gout, Hard of hearing, History of anemia, Kidney stones, Pain (06/02/2020), Sleep apnea, and Snoring.    SURGICAL HISTORY   has a past surgical history that includes lumbar decompression (11/16/2009); cystoscopy (11/14/2018); urethral dilatation (11/14/2018); urethrotomy (11/14/2018); bladder neck contracture exicision (11/14/2018); aspiration bladder insert suprapubic catheter (N/A, 4/5/2019); cystoscopy (N/A, 4/5/2019); cystoscopy (5/1/2019); urethroplasty (5/1/2019); knee arthroscopy (Left); knee arthroscopy (3/19/2009); meniscectomy, knee, medial (3/19/2009); carpal tunnel endoscopic (Left); knee arthroscopy (Right); carpal tunnel endoscopic (Right, 12/30/2016); laminotomy,lumbar disk,1 intrsp (6/10/2020); and foraminotomy (Bilateral, 6/10/2020).    FAMILY HISTORY  Family History   Problem Relation Age of Onset    Heart Disease Mother     Diabetes Mother     Hypertension Mother        SOCIAL HISTORY  Social History     Tobacco Use    Smoking status: Some Days     Types: Cigars    Smokeless tobacco: Never   Vaping Use    Vaping status: Never Used   Substance and Sexual Activity    Alcohol use: Yes     Comment: reports 2/month    Drug use: Yes     Comment: marijuana    Sexual activity: Not on file       CURRENT MEDICATIONS  Home Medications       Reviewed by Luisa Noble R.N. (Registered Nurse) on 07/19/25 at [facility]  Med List Status: Partial     Medication Last Dose Status   allopurinol (ZYLOPRIM) 300 MG Tab  Active   quiNINE 324 MG capsule  Active   vitamin e (VITAMIN E) 400 UNIT Cap  Active                    ALLERGIES  Allergies[1]    PHYSICAL EXAM  VITAL SIGNS: BP (!) 161/88   Pulse 64   Temp 36.2 °C (97.2 °F) (Temporal)   Resp 16   Ht 1.854 m (6' 1\")   Wt 106 kg (232 lb 12.9 oz)   SpO2 98%   BMI 30.71 kg/m²    Constitutional:  Well developed, well nourished; " No acute distress   HENT: Normocephalic, Atraumatic, Bilateral external ears normal, oropharyngeal examination deferred due to COVID-19 break and lack of oropharyngeal complaint.  Eyes: PERRL, EOMI, Conjunctiva normal, No discharge.   Neck: Normal range of motion, supple, nontender  Lymphatic: No lymphadenopathy noted.   Cardiovascular: Normal heart rate, Normal rhythm, No murmurs, rubs or gallops   Thorax & Lungs: CTA=bilaterally;  No respiratory distress,  No wheezing rales, or rhonchi; No chest tenderness. No crepitus or subQ air  Abdomen: soft, good bowel sounds, no guarding no rebound, no masses, no pulsatile mass, no tenderness to percussion or palpation of the abdomen, no distention  Skin: Warm, Dry, No erythema, No rash.   Back: No tenderness to the midline T-spine or L-spine.  There is healed surgical scar of the lower lumbar spine.  There is some tenderness in the right lateral abdominal wall/flank just above and posterior to the iliac crest.  No vesicles.  No ecchymosis.  No erythema.  No warmth.  No induration., No CVA tenderness.   Extremities: 2+ dp and pt pulses bilateral LEs;  Nontender; no pretibial edema  Neurologic: Alert & oriented x 4, clear speech,   Psychiatric: appropriate, normal affect     EKG/LABS  Results for orders placed or performed during the hospital encounter of 07/19/25   COMP METABOLIC PANEL    Collection Time: 07/19/25 12:44 PM   Result Value Ref Range    Sodium 139 135 - 145 mmol/L    Potassium 4.3 3.6 - 5.5 mmol/L    Chloride 106 96 - 112 mmol/L    Co2 24 20 - 33 mmol/L    Anion Gap 9.0 7.0 - 16.0    Glucose 87 65 - 99 mg/dL    Bun 12 8 - 22 mg/dL    Creatinine 1.17 0.50 - 1.40 mg/dL    Calcium 8.9 8.5 - 10.5 mg/dL    Correct Calcium 8.8 8.5 - 10.5 mg/dL    AST(SGOT) 32 12 - 45 U/L    ALT(SGPT) 27 2 - 50 U/L    Alkaline Phosphatase 87 30 - 99 U/L    Total Bilirubin 0.8 0.1 - 1.5 mg/dL    Albumin 4.1 3.2 - 4.9 g/dL    Total Protein 6.8 6.0 - 8.2 g/dL    Globulin 2.7 1.9 - 3.5  g/dL    A-G Ratio 1.5 g/dL   LIPASE    Collection Time: 07/19/25 12:44 PM   Result Value Ref Range    Lipase 197 (H) 11 - 82 U/L   URINALYSIS (UA)    Collection Time: 07/19/25 12:44 PM    Specimen: Urine   Result Value Ref Range    Color Dark Yellow     Character Cloudy (A)     Specific Gravity 1.031 <1.035    Ph 5.5 5.0 - 8.0    Glucose Negative Negative mg/dL    Ketones Trace (A) Negative mg/dL    Protein 30 (A) Negative mg/dL    Bilirubin Negative Negative    Urobilinogen, Urine 1.0 <=1.0 EU/dL    Nitrite Negative Negative    Leukocyte Esterase Large (A) Negative    Occult Blood Negative Negative    Micro Urine Req Microscopic    CBC WITH DIFFERENTIAL    Collection Time: 07/19/25 12:44 PM   Result Value Ref Range    WBC 5.1 4.8 - 10.8 K/uL    RBC 4.73 4.70 - 6.10 M/uL    Hemoglobin 13.9 (L) 14.0 - 18.0 g/dL    Hematocrit 43.0 42.0 - 52.0 %    MCV 90.9 81.4 - 97.8 fL    MCH 29.4 27.0 - 33.0 pg    MCHC 32.3 32.3 - 36.5 g/dL    RDW 41.8 35.9 - 50.0 fL    Platelet Count 283 164 - 446 K/uL    MPV 10.1 9.0 - 12.9 fL    Neutrophils-Polys 44.00 44.00 - 72.00 %    Lymphocytes 43.30 (H) 22.00 - 41.00 %    Monocytes 11.10 0.00 - 13.40 %    Eosinophils 1.00 0.00 - 6.90 %    Basophils 0.40 0.00 - 1.80 %    Immature Granulocytes 0.20 0.00 - 0.90 %    Nucleated RBC 0.00 0.00 - 0.20 /100 WBC    Neutrophils (Absolute) 2.26 1.82 - 7.42 K/uL    Lymphs (Absolute) 2.22 1.00 - 4.80 K/uL    Monos (Absolute) 0.57 0.00 - 0.85 K/uL    Eos (Absolute) 0.05 0.00 - 0.51 K/uL    Baso (Absolute) 0.02 0.00 - 0.12 K/uL    Immature Granulocytes (abs) 0.01 0.00 - 0.11 K/uL    NRBC (Absolute) 0.00 K/uL   URINE MICROSCOPIC (W/UA)    Collection Time: 07/19/25 12:44 PM   Result Value Ref Range    WBC >100 (A) /hpf    RBC 0-2 /hpf    Bacteria None Seen None /hpf    Epithelial Cells 0-2 0 - 5 /hpf    Urine Casts 0-2 0 - 2 /lpf   ESTIMATED GFR    Collection Time: 07/19/25 12:44 PM   Result Value Ref Range    GFR (CKD-EPI) 69 >60 mL/min/1.73 m 2   Lactic  Acid    Collection Time: 07/19/25  2:10 PM   Result Value Ref Range    Lactic Acid 0.9 0.5 - 2.0 mmol/L        RADIOLOGY/PROCEDURES   I have independently interpreted the diagnostic imaging associated with this visit and am waiting the final reading from the radiologist.     My preliminary interpretation is as follows: ER MD is reviewed the patient's CT scan.  No obvious obstruction.  No obvious hydronephrosis.    Radiologist interpretation:  US-RUQ   Final Result      Sludge within the gallbladder. No gallstones or biliary ductal dilatation.      CT-ABDOMEN-PELVIS WITH   Final Result      1.  Dilated appendix with circumferential appendiceal wall thickening, but no periappendiceal fat stranding, simple or complex fluid collection or appendicolith. No definite acute appendicitis.   2.  Circumferential bladder wall thickening. Correlate clinically for cystitis.   3.  Enlarged prostate gland with calcifications.   4.  Atherosclerotic calcifications in the coronary arteries and iliac arteries.             COURSE & MEDICAL DECISION MAKING    ASSESSMENT, COURSE AND PLAN  Care Narrative: Patient presents to the ER complaining of right flank pain which has been going on for the last 1 week.  Pain has been constant unrelenting.  He has no complaints of abdominal pain.  No fevers or chills.  No nausea or vomiting.  No diarrhea.  He says he has been constipated.  No cough or shortness of breath.  No urinary symptoms.  Pain is localized specifically to the right flank just posterior and a little superior to the iliac crest.  He has absolutely no tenderness on abdominal examination.  He says the pain gets worse when he moves certain directions, mostly when he bends laterally to the right or to the left, mostly to the right.  Pain also gets worse when he eats.  Pain also gets worse when he defecates and he does admit to straining when he defecates.  No diarrhea.  No blood in stool.  He had a colonoscopy 15 years ago and refuses  "to get another one because it \"caused him to bleed from his rear end for 2 days afterwards.\"  Additionally, he feels like his bowel movements and never been right since the colonoscopy.  He has not had fevers or chills.  He is not ill in appearance.  The patient is hungry and he wants to go home and eat.  He has no tenderness in the midepigastrium.  No tenderness in the right lower quadrant.  No tenderness in right upper quadrant.  He does have some sludge seen on gallbladder ultrasound but no evidence of cholecystitis.  Lipase was a little high at 197 but it has been in the 300 range 6 years ago.  No history of pancreatitis.  The patient does not drink alcohol.  He does not know what his triglyceride level is.  Again, no tenderness in the midepigastrium, he does not have any abdominal pain, and he is not having any nausea or vomiting.  At this time I do not think he has an acute pancreatitis and is recommended that he have his lipase closely followed by primary care.  He is to follow-up with general surgery regarding the gallbladder sludge.  Perhaps he is having a little bit of biliary colic causing pain when he eats, but his pain is not anywhere near the gallbladder and again, no tenderness in right upper quadrant.  Perhaps he would benefit from outpatient HIDA scan.  CT scan shows a dilated appendix with circumferential appendiceal wall thickening but there is no periappendiceal fat stranding.  There is no simple or complex fluid collection.  There is no appendicolith.  Radiologist says \"no definite acute appendicitis.\"  Patient has absolutely no tenderness in the right lower quadrant.  The pain has been constant unrelenting for the last 1 week.  No fevers or chills.  No nausea or vomiting.  He is been eating normally.  Clinically I do not think the patient has appendicitis and I do not think he needs emergent surgical consultation.  At this time I think the patient is safe for discharge home to follow-up with " urology for his urinary tract infection and his thickened bladder wall.  He has been given prescription for Motrin and Robaxin as I think he probably has a musculoskeletal cause of his right flank pain.  Patient is well-appearing.  He is not septic or toxic.  Vitals are stable.  He is not in any distress.  Patient and wife have been given very strict return precautions and discharge instructions and understand treatment plan and follow-up.    Patient's abdomen is reexamined after his workup was completed.  Patient has absolutely no tenderness on percussion or palpation of the abdomen.  There is no tenderness in the midepigastrium.  No tenderness in the right upper quadrant.  No tenderness in the right lower quadrant.  Patient's tenderness is still in the right lateral low flank just posterior and above the iliac crest.  He has no cough.  No shortness of breath.  No increased pain with deep breathing.  No concern for any pulmonary pathology.  We had a very long discussion about the follow-up that is recommended.  Since the patient's lipase is all high today at 197 (it was in the 300 range 6 years ago), he has been told to follow-up with his primary care physician within the next couple days to have a repeat lipase done.  At this time he has no tenderness in the midepigastrium.  He is not vomiting.  He has been eating.  He is hungry.  He has not had any nausea.  No concern for an acute pancreatitis at this time.  He has sludge seen in the gallbladder for which he will need follow-up with general surgery.  Again, no tenderness in the right upper quadrant.  No evidence of cholecystitis on ultrasound or CT scan.  Labs are unremarkable.  At this time I do not think he needs an emergent surgical consultation.  He can follow-up outpatient with surgery on-call.  He was found to have a thickened bladder wall on CT scan.  He has what looks like a urinary tract infection here today.  He follows with Dr. Ayon, urologist.  He is  "to follow up with him this week as he may need outpatient cystoscopy since his bladder wall is circumferentially thick.  Patient has been referred to general surgery.  He he is to return to the ER immediately for any worsening.    DISPOSITION AND DISCUSSIONS  I have discussed management of the patient with the following physicians and VIVI's: None    Discussion of management with other Q or appropriate source(s): None     Escalation of care considered, and ultimately not performed:acute inpatient care management, however at this time, the patient is most appropriate for outpatient management.  No intra-abdominal pathology that would require emergent surgical consultation or admission to the hospital.  Close outpatient follow-up is recommended instead.    Barriers to care at this time, including but not limited to: None known.     Decision tools and prescription drugs considered including, but not limited to: Antibiotics patient will go home with antibiotics for urinary tract infection..    FINAL DIAGNOSIS  1. Flank pain Acute   2. Acute UTI Acute   3. Elevated lipase Acute   4. Gallbladder sludge Acute        This dictation has been created using voice recognition software. The accuracy of the dictation is limited by the abilities of the software. I expect there may be some errors of grammar and possibly content. I made every attempt to manually correct the errors within my dictation. However, errors related to voice recognition software may still exist and should be interpreted within the appropriate context.     Electronically signed by: Terra Fair M.D., 7/19/2025 12:07 PM           [1]   Allergies  Allergen Reactions    Aspirin Unspecified     \"G6PD deficient\"      "

## 2025-07-19 NOTE — ED NOTES
ERP at bedside. Pt agrees with plan of care discussed by ERP. Edwige in low position, side rail up for pt safety. Call light within reach. Plan of care on-going

## 2025-07-21 NOTE — Clinical Note
REFERRAL APPROVAL NOTICE         Sent on July 21, 2025                   Ra Smith  [address] [place] [address]                   Dear Mr. Smith,    After a careful review of the medical information and benefit coverage, [facility] has processed your referral. See below for additional details.    If applicable, you must be actively enrolled with your insurance for coverage of the authorized service. If you have any questions regarding your coverage, please contact your insurance directly.    REFERRAL INFORMATION   Referral #:  [id number]  Referred-To Provider    Referred-By Provider:  Surgery    KENTON Graves ALVARO H      [address] [facility]  [address]  [place] [address]  [phone] [address] [facility]  [address]  [place] [id number] [phone]    Referral Start Date:  07/19/2025  Referral End Date:   07/19/2026             SCHEDULING  If you do not already have an appointment, please call [phone] to make an appointment.     MORE INFORMATION  If you do not already have a [employer] account, sign up at: [email or web address]  You can access your medical information, make appointments, see lab results, billing information, and more.  If you have questions regarding this referral, please contact  the [facility] Referrals department at:             [phone]. Monday - Friday 8:00AM - 5:00PM.     Sincerely,    [facility]

## 2025-07-22 LAB
BACTERIA UR CULT: ABNORMAL
SIGNIFICANT IND 70042: ABNORMAL
SITE SITE: ABNORMAL
SOURCE SOURCE: ABNORMAL

## 2025-07-23 RX ORDER — CIPROFLOXACIN 500 MG/1
500 TABLET, FILM COATED ORAL 2 TIMES DAILY
Qty: 20 TABLET | Refills: 0 | Status: ACTIVE | OUTPATIENT
Start: 2025-07-23 | End: 2025-08-02

## 2025-07-23 NOTE — ED NOTES
"ED Positive Culture Follow-up/Notification Note:   Date: 7/23/25    Patient seen in the ED on 7/19/2025 for flank pain that started about a week and a half prior to coming to the emergency department. On physical exam he was positive for some right lateral abdominal wall/flank tenderness just above and posterior to the iliac crest. He was afebrile, hypertensive, and normal leukocytes. His urine analysis was positive for LCE, had greater than >100 wbc's, and minimal epithelial cells. The culture was positive for Pseudomonas aeruginosa 10-50,000 cfu/mL and he was prescribed bactrim for 7 days. Bactrim will not be sufficient for treatment of pseudomonas. Ciprofloxacin is a more appropriate for treatment of pseudomonas. He does not have a history of prolonged Qtc and last EKG was taken in 2020. Ciprofloxacin and quinine do have a drug-drug interaction with concern for prolongation of Qtc.   1. Flank pain Acute   2. Acute UTI Acute   3. Elevated lipase Acute   4. Gallbladder sludge Acute     Discharge Medication List as of 7/19/2025  3:55 PM        START taking these medications    Details   ibuprofen (MOTRIN) 800 MG Tab Take 1 Tablet by mouth every 8 hours as needed for Moderate Pain for up to 3 days., Disp-9 Tablet, R-0, Normal      methocarbamol (ROBAXIN) 750 MG Tab Take 1 Tablet by mouth 4 times a day for 3 days., Disp-12 Tablet, R-0, Normal      sulfamethoxazole-trimethoprim (BACTRIM DS) 800-160 MG tablet Take 1 Tablet by mouth 2 times a day for 7 days., Disp-14 Tablet, R-0, Normal           Allergies: Aspirin    Vitals:    07/19/25 1032 07/19/25 1036 07/19/25 1411 07/19/25 1503   BP:   (!) 161/88    Pulse:  65 67 64   Resp:  16     Temp:  36.2 °C (97.2 °F)     TempSrc:  Temporal     SpO2:  96% 97% 98%   Weight: 106 kg (232 lb 12.9 oz)      Height: 1.854 m (6' 1\")          Final cultures:   Results       Procedure Component Value Units Date/Time    Urine Culture (New) [[id number]]  (Abnormal)  (Susceptibility) " Collected: 07/19/25 1244    Order Status: Completed Specimen: Urine Updated: 07/22/25 1046     Significant Indicator POS     Source UR     Site -     Culture Result -      Pseudomonas aeruginosa  10-50,000 cfu/mL        Pseudomonas aeruginosa  <10,000 cfu/mL  Presumptive identification, second colony morphology      Susceptibility       Pseudomonas aeruginosa (1)       Antibiotic Interpretation Microscan   Method Status    Amikacin Sensitive <=16 mcg/mL ARTURO Final    Aztreonam  [*]  Sensitive <=4 mcg/mL ARTURO Final    Ceftolozane+Tazobactam  [*]  Sensitive <=2 mcg/mL ARTURO Final    Ceftazidime Sensitive <=1 mcg/mL ARTURO Final    Ciprofloxacin Sensitive <=0.25 mcg/mL ARTURO Final    Cefepime Sensitive <=2 mcg/mL ARTURO Final    Imipenem  [*]  Sensitive <=1 mcg/mL ARTURO Final    Levofloxacin Sensitive <=0.5 mcg/mL ARTURO Final    Meropenem Sensitive <=1 mcg/mL ARTURO Final    Pip/Tazobactam Sensitive <=8 mcg/mL ARTURO Final               [*]  Suppressed Antibiotic                   URINALYSIS (UA) [[id number]]  (Abnormal) Collected: 07/19/25 1244    Order Status: Completed Specimen: Urine Updated: 07/19/25 1330     Color Dark Yellow     Character Cloudy     Specific Gravity 1.031     Ph 5.5     Glucose Negative mg/dL      Ketones Trace mg/dL      Protein 30 mg/dL      Bilirubin Negative     Urobilinogen, Urine 1.0 EU/dL      Nitrite Negative     Leukocyte Esterase Large     Occult Blood Negative     Micro Urine Req Microscopic            Plan:   Isolated organism is not susceptible to prescribed therapy.   Discussed culture result and change in antibiotics with patient, who will  new prescription at [facility].  Discussed DDI with patient. He states taking quinine only as needed after physically exerting himself during the summer. Reports taking about one tablet per day. Recommended avoiding quinine during ciprofloxacin therapy and maintaining adequate hydration.   Discussed administration with patient. He reports taking a  multivitamin daily. Recommended taking ciprofloxacin morning and night and adjusting the vitamin to the afternoon.    Counseled on discontinuing current Bactrim prescription.   Patient verbalized understanding and agreed with the plan.   New Rx:  Ciprofloxacin x 500 mg #20  days #10, no refills - MD: Vandana per protocol  Song Lama, PharmD

## 2025-08-05 ENCOUNTER — HOSPITAL ENCOUNTER (OUTPATIENT)
Dept: LAB | Facility: MEDICAL CENTER | Age: 65
End: 2025-08-05
Attending: INTERNAL MEDICINE
Payer: COMMERCIAL

## 2025-08-05 ENCOUNTER — HOSPITAL ENCOUNTER (OUTPATIENT)
Dept: LAB | Facility: MEDICAL CENTER | Age: 65
End: 2025-08-05
Attending: UROLOGY
Payer: COMMERCIAL

## 2025-08-05 LAB
ALBUMIN SERPL BCP-MCNC: 4.2 G/DL (ref 3.2–4.9)
ALBUMIN/GLOB SERPL: 1.7 G/DL
ALP SERPL-CCNC: 75 U/L (ref 30–99)
ALT SERPL-CCNC: 28 U/L (ref 2–50)
ANION GAP SERPL CALC-SCNC: 13 MMOL/L (ref 7–16)
APPEARANCE UR: CLEAR
AST SERPL-CCNC: 35 U/L (ref 12–45)
BACTERIA #/AREA URNS HPF: ABNORMAL /HPF
BASOPHILS # BLD AUTO: 0.6 % (ref 0–1.8)
BASOPHILS # BLD: 0.03 K/UL (ref 0–0.12)
BILIRUB SERPL-MCNC: 1.1 MG/DL (ref 0.1–1.5)
BILIRUB UR QL STRIP.AUTO: NEGATIVE
BUN SERPL-MCNC: 11 MG/DL (ref 8–22)
CALCIUM ALBUM COR SERPL-MCNC: 8.9 MG/DL (ref 8.5–10.5)
CALCIUM SERPL-MCNC: 9.1 MG/DL (ref 8.5–10.5)
CASTS URNS QL MICRO: ABNORMAL /LPF (ref 0–2)
CHLORIDE SERPL-SCNC: 106 MMOL/L (ref 96–112)
CO2 SERPL-SCNC: 21 MMOL/L (ref 20–33)
COLOR UR: ABNORMAL
CREAT SERPL-MCNC: 1.01 MG/DL (ref 0.5–1.4)
EOSINOPHIL # BLD AUTO: 0.06 K/UL (ref 0–0.51)
EOSINOPHIL NFR BLD: 1.1 % (ref 0–6.9)
EPITHELIAL CELLS 1715: ABNORMAL /HPF (ref 0–5)
ERYTHROCYTE [DISTWIDTH] IN BLOOD BY AUTOMATED COUNT: 39.9 FL (ref 35.9–50)
FASTING STATUS PATIENT QL REPORTED: NORMAL
GFR SERPLBLD CREATININE-BSD FMLA CKD-EPI: 82 ML/MIN/1.73 M 2
GLOBULIN SER CALC-MCNC: 2.5 G/DL (ref 1.9–3.5)
GLUCOSE SERPL-MCNC: 94 MG/DL (ref 65–99)
GLUCOSE UR STRIP.AUTO-MCNC: NEGATIVE MG/DL
HCT VFR BLD AUTO: 42.7 % (ref 42–52)
HGB BLD-MCNC: 13.5 G/DL (ref 14–18)
IMM GRANULOCYTES # BLD AUTO: 0.01 K/UL (ref 0–0.11)
IMM GRANULOCYTES NFR BLD AUTO: 0.2 % (ref 0–0.9)
KETONES UR STRIP.AUTO-MCNC: ABNORMAL MG/DL
LEUKOCYTE ESTERASE UR QL STRIP.AUTO: ABNORMAL
LIPASE SERPL-CCNC: 186 U/L (ref 11–82)
LYMPHOCYTES # BLD AUTO: 2.15 K/UL (ref 1–4.8)
LYMPHOCYTES NFR BLD: 39.4 % (ref 22–41)
MCH RBC QN AUTO: 28.2 PG (ref 27–33)
MCHC RBC AUTO-ENTMCNC: 31.6 G/DL (ref 32.3–36.5)
MCV RBC AUTO: 89.1 FL (ref 81.4–97.8)
MICRO URNS: ABNORMAL
MONOCYTES # BLD AUTO: 0.54 K/UL (ref 0–0.85)
MONOCYTES NFR BLD AUTO: 9.9 % (ref 0–13.4)
NEUTROPHILS # BLD AUTO: 2.66 K/UL (ref 1.82–7.42)
NEUTROPHILS NFR BLD: 48.8 % (ref 44–72)
NITRITE UR QL STRIP.AUTO: NEGATIVE
NRBC # BLD AUTO: 0 K/UL
NRBC BLD-RTO: 0 /100 WBC (ref 0–0.2)
PH UR STRIP.AUTO: 6 [PH] (ref 5–8)
PLATELET # BLD AUTO: 253 K/UL (ref 164–446)
PMV BLD AUTO: 10.9 FL (ref 9–12.9)
POTASSIUM SERPL-SCNC: 3.9 MMOL/L (ref 3.6–5.5)
PROT SERPL-MCNC: 6.7 G/DL (ref 6–8.2)
PROT UR QL STRIP: NEGATIVE MG/DL
PSA SERPL DL<=0.01 NG/ML-MCNC: 9.91 NG/ML (ref 0–4)
RBC # BLD AUTO: 4.79 M/UL (ref 4.7–6.1)
RBC # URNS HPF: ABNORMAL /HPF (ref 0–2)
RBC UR QL AUTO: NEGATIVE
SODIUM SERPL-SCNC: 140 MMOL/L (ref 135–145)
SP GR UR STRIP.AUTO: 1.02
UROBILINOGEN UR STRIP.AUTO-MCNC: 1 EU/DL
WBC # BLD AUTO: 5.5 K/UL (ref 4.8–10.8)
WBC #/AREA URNS HPF: ABNORMAL /HPF

## 2025-08-05 PROCEDURE — 87086 URINE CULTURE/COLONY COUNT: CPT

## 2025-08-05 PROCEDURE — 80053 COMPREHEN METABOLIC PANEL: CPT

## 2025-08-05 PROCEDURE — 83690 ASSAY OF LIPASE: CPT

## 2025-08-05 PROCEDURE — 85025 COMPLETE CBC W/AUTO DIFF WBC: CPT

## 2025-08-05 PROCEDURE — 36415 COLL VENOUS BLD VENIPUNCTURE: CPT

## 2025-08-05 PROCEDURE — 84153 ASSAY OF PSA TOTAL: CPT

## 2025-08-05 PROCEDURE — 81001 URINALYSIS AUTO W/SCOPE: CPT

## 2025-08-08 LAB
BACTERIA UR CULT: NORMAL
SIGNIFICANT IND 70042: NORMAL
SITE SITE: NORMAL
SOURCE SOURCE: NORMAL

## (undated) DEVICE — SUTURE 3-0 CHROMIC GUT SH 27 (36PK/BX)"

## (undated) DEVICE — SUTURE 1 VICRYL PLUS CT-1 - 18 INCH (12/BX)

## (undated) DEVICE — GOWN SURGEONS X-LARGE - DISP. (30/CA)

## (undated) DEVICE — ELECTRODE DUAL RETURN W/ CORD - (50/PK)

## (undated) DEVICE — NEPTUNE 4 PORT MANIFOLD - (20/PK)

## (undated) DEVICE — SUTURE GENERAL

## (undated) DEVICE — WATER IRRIG. STER 3000 ML - (4/CA)

## (undated) DEVICE — Device

## (undated) DEVICE — RINGDISP RETRACTOR LONESTAR

## (undated) DEVICE — WATER IRRIG. STER. 1500 ML - (9/CA)

## (undated) DEVICE — TUBE CONNECT SUCTION CLEAR 120 X 1/4" (50EA/CA)"

## (undated) DEVICE — TOOL DISSECT MATCH HEAD

## (undated) DEVICE — SUTURE 4-0 VICRYL PLUS RB-1 - 27 INCH (36/BX)

## (undated) DEVICE — GLYCINE IRRIGATION 1.5% - 3000 ML  (4/CS)

## (undated) DEVICE — MASK ANESTHESIA ADULT  - (100/CA)

## (undated) DEVICE — CATHETER FOLEY 22 FR 30 CC (12EA/CA)

## (undated) DEVICE — DRAPE IOBAN II INCISE 23X17 - (10EA/BX 4BX/CA)

## (undated) DEVICE — KIT ROOM DECONTAMINATION

## (undated) DEVICE — PATTIES SURG NEURO X-RAY 1/2X1/2 (10EA/PK 20PK/CS)

## (undated) DEVICE — LACTATED RINGERS INJ 1000 ML - (14EA/CA 60CA/PF)

## (undated) DEVICE — SUTURE 2-0 VICRYL PLUS CT-1 - 8 X 18 INCH(12/BX)

## (undated) DEVICE — SODIUM CHL IRRIGATION 0.9% 1000ML (12EA/CA)

## (undated) DEVICE — GLOVE BIOGEL INDICATOR SZ 8 SURGICAL PF LTX - (50/BX 4BX/CA)

## (undated) DEVICE — BAG DRAINAGE ANTIREFLUX TOWER SLIDE TAP 4000 ML (20EA/CA)

## (undated) DEVICE — LACTATED RINGERS INJ. 500 ML - (24EA/CA)

## (undated) DEVICE — WATER IRRIGATION STERILE 1000ML (12EA/CA)

## (undated) DEVICE — SPONGE XRAY 8X4 STERL. 12PL - (10EA/TY 80TY/CA)

## (undated) DEVICE — DRAIN PENROSE STERILE 1/4 X - 18 IN  (25EA/BX)

## (undated) DEVICE — SENSOR SPO2 NEO LNCS ADHESIVE (20/BX) SEE USER NOTES

## (undated) DEVICE — SPONGE GAUZESTER 4 X 4 4PLY - (128PK/CA)

## (undated) DEVICE — CLIP SM INTNL HRZN TI ESCP LGT - (24EA/PK 25PK/BX)

## (undated) DEVICE — JELLY, KY 2 0Z STERILE

## (undated) DEVICE — POWDER, SURGIFOAM 1GM

## (undated) DEVICE — GLOVE BIOGEL ECLIPSE  PF LATEX SIZE 6.5 (50PR/BX)

## (undated) DEVICE — SET EXTENSION WITH 2 PORTS (48EA/CA) ***PART #2C8610 IS A SUBSTITUTE*****

## (undated) DEVICE — ELECTRODE 850 FOAM ADHESIVE - HYDROGEL RADIOTRNSPRNT (50/PK)

## (undated) DEVICE — PUMP ON-Q 270 DUAL 2ML FIXED RATE (5EA/CA)

## (undated) DEVICE — GLOVE BIOGEL PI ORTHO SZ 7 PF LF (40PR/BX)

## (undated) DEVICE — SET LEADWIRE 5 LEAD BEDSIDE DISPOSABLE ECG (1SET OF 5/EA)

## (undated) DEVICE — CHLORAPREP 26 ML APPLICATOR - ORANGE TINT(25/CA)

## (undated) DEVICE — TRAY SRGPRP PVP IOD WT PRP - (20/CA)

## (undated) DEVICE — TUBING C&T SET FLYING LEADS DRAIN TUBING (10EA/BX)

## (undated) DEVICE — MIDAS LUBRICATOR DIFFUSER PACK (4EA/CA)

## (undated) DEVICE — ZIPWIRE .038 STRAIGHT BENTSON TIP (5EA/BX)

## (undated) DEVICE — DERMABOND ADVANCED - (12EA/BX)

## (undated) DEVICE — GLOVE BIOGEL SZ 7.5 SURGICAL PF LTX - (50PR/BX 4BX/CA)

## (undated) DEVICE — GLOVE BIOGEL PI INDICATOR SZ 7.0 SURGICAL PF LF - (50/BX 4BX/CA)

## (undated) DEVICE — SUCTION INSTRUMENT YANKAUER BULBOUS TIP W/O VENT (50EA/CA)

## (undated) DEVICE — KIT ANESTHESIA W/CIRCUIT & 3/LT BAG W/FILTER (20EA/CA)

## (undated) DEVICE — PROTECTOR ULNA NERVE - (36PR/CA)

## (undated) DEVICE — CATHETER ON-Q SILVER SOAKER 5IN  (5EA/CA)  - SUB ORDER #4428

## (undated) DEVICE — GLOVE BIOGEL SZ 8 SURGICAL PF LTX - (50PR/BX 4BX/CA)

## (undated) DEVICE — HEADREST PRONEVIEW LARGE - (10/CA)

## (undated) DEVICE — GOWN SURGICAL X-LARGE ULTRA - FILM-REINFORCED (20/CA)

## (undated) DEVICE — PACK CYSTOSCOPY - (14/CA)

## (undated) DEVICE — LEGGING LITHOTOMY 31 X 48 IN - (2EA/PK 20PK/CA)

## (undated) DEVICE — ARMREST CRADLE FOAM - (2PR/PK 12PR/CA)

## (undated) DEVICE — SLEEVE, VASO, THIGH, MED

## (undated) DEVICE — SET IRRIGATION CYSTOSCOPY TUBE L80 IN (20EA/CA)

## (undated) DEVICE — GOWN WARMING STANDARD FLEX - (30/CA)

## (undated) DEVICE — CATHETER URETHRAL FOLEY SILICONE OD14 FR 10 ML (10EA/CA)

## (undated) DEVICE — CATHETER IV 20 GA X 1-1/4 ---SURG.& SDS ONLY--- (50EA/BX)

## (undated) DEVICE — NEEDLE SPINAL NON-SAFETY 18 GA X 3 IN (25EA/BX)

## (undated) DEVICE — TUBING CLEARLINK DUO-VENT - C-FLO (48EA/CA)

## (undated) DEVICE — SPONGE DRAIN 4 X 4IN 6-PLY - (2/PK25PK/BX12BX/CS)

## (undated) DEVICE — COVER FOOT UNIVERSAL DISP. - (25EA/CA)

## (undated) DEVICE — PLUG CATHETER DRAIN TUBE PROTECTOR CAP

## (undated) DEVICE — TUBE CONNECTING SUCTION - CLEAR PLASTIC STERILE 72 IN (50EA/CA)

## (undated) DEVICE — MASK, LARYNGEAL AIRWAY #5

## (undated) DEVICE — SUTURE 4-0 MONOCRYL PLUS PS-2 - 27 INCH (36/BX)

## (undated) DEVICE — BAG DRAINAGE URINARY CLOSED 2000ML (20EA/CA)

## (undated) DEVICE — MASK, LARYNGEAL AIRWAY #4

## (undated) DEVICE — CLIP MED INTNL HRZN TI ESCP - (25/BX)

## (undated) DEVICE — SYRINGE SAFETY 10 ML 18 GA X 1 1/2 BLUNT LL (100/BX 4BX/CA)

## (undated) DEVICE — BOVIE NEEDLE TIP 3CM COLORADO

## (undated) DEVICE — HEAD HOLDER JUNIOR/ADULT

## (undated) DEVICE — PACK NEURO - (2EA/CA)

## (undated) DEVICE — SUTURE 4-0 PDS II RB-1 (36EA/BX)

## (undated) DEVICE — DRAPE UNDER BUTTOCKS FLUID - (20/CA)

## (undated) DEVICE — EVACUATOR BLADDER ELLIK - (10/BX)

## (undated) DEVICE — BLADE SURGICAL #15 - (50/BX 3BX/CA)

## (undated) DEVICE — DRAPE SURG STERI-DRAPE 7X11OD - (40EA/CA)

## (undated) DEVICE — CATHETER FOLEY 22FR 5CC

## (undated) DEVICE — SUTURE 2-0 SILK FS (12EA/BX)

## (undated) DEVICE — PACK CYSTOSCOPY III - (8/CA)

## (undated) DEVICE — BLADE SURGICAL CLIPPER - (50EA/CA)

## (undated) DEVICE — CONNECTOR HOSE NEPTUNE FOR CYSTO ROOM

## (undated) DEVICE — CORD RESECTO DISP ACT DAC-1 FOR USA RESECTOSCOPE (12EA/BX)

## (undated) DEVICE — SEALER BIPOLAR 2.3 AQUAMANTYS

## (undated) DEVICE — SET IRRIGATION CYSTOSCOPY Y-TYPE L81 IN (20EA/CA)

## (undated) DEVICE — DRAPE LAPAROTOMY T SHEET - (12EA/CA)

## (undated) DEVICE — DRAPE VAGINAL BIB W/ POUCH (10EA/CA)

## (undated) DEVICE — GAUZE FLUFF STERILE 2-PLY 36 X 36 (100EA/CA)

## (undated) DEVICE — GLOVE, LITE (PAIR)

## (undated) DEVICE — CANISTER SUCTION 3000ML MECHANICAL FILTER AUTO SHUTOFF MEDI-VAC NONSTERILE LF DISP  (40EA/CA)

## (undated) DEVICE — DEVICE MONOPOLAR RF PEAK PLASMABLADE 3.0S

## (undated) DEVICE — CONTAINER SPECIMEN BAG OR - STERILE 4 OZ W/LID (100EA/CA)

## (undated) DEVICE — STAPLER SKIN DISP - (6/BX 10BX/CA) VISISTAT

## (undated) DEVICE — PACK MINOR BASIN - (2EA/CA)

## (undated) DEVICE — KIT SURGIFLO W/OUT THROMBIN - (6EA/CA)

## (undated) DEVICE — SPONGE PEANUT - (5/PK 50PK/CA)

## (undated) DEVICE — BAG URODRAIN WITH TUBING - (20/CA)

## (undated) DEVICE — DRAPE LARGE 3 QUARTER - (20/CA)

## (undated) DEVICE — SUTURE 3-0 VICRYL PLUS SH - 27 INCH (36/BX)

## (undated) DEVICE — GLOVE BIOGEL PI ORTHO SZ 7.5 PF LF (40PR/BX)